# Patient Record
Sex: FEMALE | Race: WHITE | Employment: FULL TIME | URBAN - METROPOLITAN AREA
[De-identification: names, ages, dates, MRNs, and addresses within clinical notes are randomized per-mention and may not be internally consistent; named-entity substitution may affect disease eponyms.]

---

## 2023-02-21 PROBLEM — F41.9 ANXIETY: Status: ACTIVE | Noted: 2023-02-21

## 2023-02-21 PROBLEM — G47.19 EXCESSIVE DAYTIME SLEEPINESS: Status: ACTIVE | Noted: 2023-02-21

## 2023-02-21 PROBLEM — K90.49 FOOD INTOLERANCE IN ADULT: Status: ACTIVE | Noted: 2023-02-21

## 2023-02-21 PROBLEM — R53.83 FATIGUE: Status: ACTIVE | Noted: 2023-02-21

## 2023-02-21 PROBLEM — R63.0 ANOREXIA: Status: ACTIVE | Noted: 2023-02-21

## 2023-02-21 PROBLEM — S61.239A PUNCTURE WOUND OF FINGER OF LEFT HAND: Status: ACTIVE | Noted: 2023-02-21

## 2023-03-29 ENCOUNTER — OFFICE VISIT (OUTPATIENT)
Dept: PRIMARY CARE | Facility: CLINIC | Age: 23
End: 2023-03-29
Payer: COMMERCIAL

## 2023-03-29 VITALS
HEART RATE: 76 BPM | HEIGHT: 65 IN | WEIGHT: 127.6 LBS | BODY MASS INDEX: 21.26 KG/M2 | DIASTOLIC BLOOD PRESSURE: 70 MMHG | SYSTOLIC BLOOD PRESSURE: 104 MMHG

## 2023-03-29 DIAGNOSIS — F41.9 ANXIETY: ICD-10-CM

## 2023-03-29 DIAGNOSIS — Z00.00 HEALTHCARE MAINTENANCE: Primary | ICD-10-CM

## 2023-03-29 DIAGNOSIS — R63.0 ANOREXIA: ICD-10-CM

## 2023-03-29 PROBLEM — G47.19 EXCESSIVE DAYTIME SLEEPINESS: Status: RESOLVED | Noted: 2023-02-21 | Resolved: 2023-03-29

## 2023-03-29 PROBLEM — K90.49 FOOD INTOLERANCE IN ADULT: Status: RESOLVED | Noted: 2023-02-21 | Resolved: 2023-03-29

## 2023-03-29 PROBLEM — S61.239A PUNCTURE WOUND OF FINGER OF LEFT HAND: Status: RESOLVED | Noted: 2023-02-21 | Resolved: 2023-03-29

## 2023-03-29 PROCEDURE — 1036F TOBACCO NON-USER: CPT | Performed by: FAMILY MEDICINE

## 2023-03-29 PROCEDURE — 99395 PREV VISIT EST AGE 18-39: CPT | Performed by: FAMILY MEDICINE

## 2023-03-29 NOTE — PROGRESS NOTES
"Subjective   Patient ID: Whit Chacko is a 23 y.o. female who presents for physical exam.     HPI   Patient presents for physical exam.  He was at the [facility] February through May.  She was then sent to [facility].  Was diagnosed with anorexia September 2021 was seeing a therapist weekly and dietitian for through the month of October 2022.    Currently patient is still having thoughts about restriction and body image.  She is a self-proclaimed perfectionist.  She then saw another dietitian that was very unhelpful.  She also started her therapist again in April 2022 weekly.  This is Geena Rios through the [facility].  Apparently in December 2022 she started food aversion again and did this for 3 weeks.  She was only eating a small snack daily for 3 weeks.  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  She started his new job March 2023.      Fam Hx  Mom side heart issues  Mom ()  Dad ()  Sister  Sister Bipolar  Brother  Brother    Exercise walks, plays tennis  ETOH denies  Caffeine denies  Tobacco denies    Mammogram @ 40  DEXA @ 65  Colonoscopy @ 45    Patient denies other complaints.    Review of Systems  Denies any other complaints at this time.     Objective   /70 (BP Location: Right arm)   Pulse 76   Ht 1.657 m (5' 5.25\")   Wt 57.9 kg (127 lb 9.6 oz)   BMI 21.07 kg/m²   BSA Body surface area is 1.63 meters squared.      Physical Exam  General alert no distress  HEENT EOMI PERRLA  Heart no murmurs rubs gallops appreciated  Lung no wheezes no rales no rhonchi appreciated  Abdomen soft tender no rebound rigidity or guarding appreciated    Legacy Encounter on 02/13/2023   Component Date Value Ref Range Status    Color, Urine 02/13/2023 YELLOW  STRAW,YELLOW Final    Appearance, Urine 02/13/2023 CLEAR  CLEAR Final    Specific Gravity, Urine 02/13/2023 1.020  1.005 - 1.035 Final    " pH, Urine 02/13/2023 5.0  5.0 - 8.0 Final    Protein, Urine 02/13/2023 NEGATIVE  NEGATIVE mg/dL Final    Glucose, Urine 02/13/2023 NEGATIVE  NEGATIVE mg/dL Final    Blood, Urine 02/13/2023 NEGATIVE  NEGATIVE Final    Ketones, Urine 02/13/2023 NEGATIVE  NEGATIVE mg/dL Final    Bilirubin, Urine 02/13/2023 NEGATIVE  NEGATIVE Final    Urobilinogen, Urine 02/13/2023 <2.0  0.0 - 1.9 mg/dL Final    Nitrite, Urine 02/13/2023 NEGATIVE  NEGATIVE Final    Leukocyte Esterase, Urine 02/13/2023 NEGATIVE  NEGATIVE Final    Glucose 02/13/2023 95  74 - 99 mg/dL Final    Sodium 02/13/2023 139  136 - 145 mmol/L Final    Potassium 02/13/2023 4.6  3.5 - 5.3 mmol/L Final    Chloride 02/13/2023 103  98 - 107 mmol/L Final    Bicarbonate 02/13/2023 28  21 - 32 mmol/L Final    Anion Gap 02/13/2023 13  10 - 20 mmol/L Final    Urea Nitrogen 02/13/2023 12  6 - 23 mg/dL Final    Creatinine 02/13/2023 0.85  0.50 - 1.05 mg/dL Final    GFR Female 02/13/2023 >90  >90 mL/min/1.73m2 Final    Comment:  CALCULATIONS OF ESTIMATED GFR ARE PERFORMED   USING THE 2021 CKD-EPI STUDY REFIT EQUATION   WITHOUT THE RACE VARIABLE FOR THE IDMS-TRACEABLE   CREATININE METHODS.    https://jasn.asnjournals.org/content/early/2021/09/22/ASN.7795374636      Calcium 02/13/2023 10.1  8.6 - 10.6 mg/dL Final    Albumin 02/13/2023 4.9  3.4 - 5.0 g/dL Final    Alkaline Phosphatase 02/13/2023 38  33 - 110 U/L Final    Total Protein 02/13/2023 7.4  6.4 - 8.2 g/dL Final    AST 02/13/2023 54 (H)  9 - 39 U/L Final    Total Bilirubin 02/13/2023 0.7  0.0 - 1.2 mg/dL Final    ALT (SGPT) 02/13/2023 17  7 - 45 U/L Final    Comment:  Patients treated with Sulfasalazine may generate    falsely decreased results for ALT.      WBC 02/13/2023 4.5  4.4 - 11.3 x10E9/L Final    nRBC 02/13/2023 0.0  0.0 - 0.0 /100 WBC Final    RBC 02/13/2023 4.81  4.00 - 5.20 x10E12/L Final    Hemoglobin 02/13/2023 14.4  12.0 - 16.0 g/dL Final    Hematocrit 02/13/2023 44.2  36.0 - 46.0 % Final    MCV 02/13/2023  92  80 - 100 fL Final    MCHC 02/13/2023 32.6  32.0 - 36.0 g/dL Final    Platelets 02/13/2023 271  150 - 450 x10E9/L Final    RDW 02/13/2023 12.1  11.5 - 14.5 % Final    Neutrophils % 02/13/2023 56.9  40.0 - 80.0 % Final    Immature Granulocytes %, Automated 02/13/2023 0.2  0.0 - 0.9 % Final    Comment:  Immature Granulocyte Count (IG) includes promyelocytes,    myelocytes and metamyelocytes but does not include bands.   Percent differential counts (%) should be interpreted in the   context of the absolute cell counts (cells/L).      Lymphocytes % 02/13/2023 32.8  13.0 - 44.0 % Final    Monocytes % 02/13/2023 6.5  2.0 - 10.0 % Final    Eosinophils % 02/13/2023 2.5  0.0 - 6.0 % Final    Basophils % 02/13/2023 1.1  0.0 - 2.0 % Final    Neutrophils Absolute 02/13/2023 2.55  1.20 - 7.70 x10E9/L Final    Lymphocytes Absolute 02/13/2023 1.47  1.20 - 4.80 x10E9/L Final    Monocytes Absolute 02/13/2023 0.29  0.10 - 1.00 x10E9/L Final    Eosinophils Absolute 02/13/2023 0.11  0.00 - 0.70 x10E9/L Final    Basophils Absolute 02/13/2023 0.05  0.00 - 0.10 x10E9/L Final    Phosphorus 02/13/2023 3.8  2.5 - 4.9 mg/dL Final    Comment:  The performance characteristics of phosphorus testing in   heparinized plasma have been validated by the individual     laboratory site where testing is performed. Testing    on heparinized plasma is not approved by the FDA;    however, such approval is not necessary.      Magnesium 02/13/2023 2.18  1.60 - 2.40 mg/dL Final   Legacy Encounter on 12/27/2022   Component Date Value Ref Range Status    Hepatitis C Ab 12/27/2022 NONREACTIVE  NONREACTIVE Final    Comment:  Results from patients taking biotin supplements or receiving   high-dose biotin therapy should be interpreted with caution   due to possible interference with this test. Providers may    contact their local laboratory for further information.     Legacy Encounter on 12/08/2022   Component Date Value Ref Range Status    DRUG SCREEN  COMMENT URINE 12/08/2022 SEE BELOW   Final    Comment: Drug screen results are presumptive and should not be used to assess   compliance with prescribed medication. Definitive confirmatory drug testing   has been added to this sample for any positive screen result and will be   reported separately.   .  Toxicology screening results are reported qualitatively. The concentration   must be greater than or equal to the cutoff to be reported as positive. The   concentration at which the screening test can detect an individual drug or   metabolite varies. The absence of expected drug(s) and/or drug metabolite(s)   may indicate non-compliance, inappropriate timing of specimen collection   relative to drug administration, poor drug absorption, diluted/adulterated   urine, or limitations of testing. For medical purposes only; not valid for   forensic use.   .  Interpretive questions should be directed to the laboratory medical   directors.        Amphetamine Screen, Urine 12/08/2022 PRESUMPTIVE NEGATIVE  NEGATIVE Final    Comment:  CUTOFF LEVEL:  500 NG/ML   Cross-reactivity has been reported with high concentrations   of the following drugs: buproprion, chloroquine, chlorpromazine,   ephedrine, mephentermine, fenfluramine, phentermine,   phenylpropanolamine, pseudoephedrine, and propranolol.      Barbiturate Screen, Urine 12/08/2022 PRESUMPTIVE NEGATIVE  NEGATIVE Final     CUTOFF LEVEL: 200 NG/ML    BENZODIAZEPINE (PRESENCE) IN URINE* 12/08/2022 PRESUMPTIVE NEGATIVE  NEGATIVE Final     CUTOFF LEVEL: 200 NG/ML    Cannabinoid Screen, Urine 12/08/2022 PRESUMPTIVE NEGATIVE  NEGATIVE Final     CUTOFF LEVEL: 50 NG/ML    Cocaine Screen, Urine 12/08/2022 PRESUMPTIVE NEGATIVE  NEGATIVE Final     CUTOFF LEVEL: 150 NG/ML    Fentanyl, Ur 12/08/2022 PRESUMPTIVE NEGATIVE  NEGATIVE Final      CUTOFF LEVEL:  5 NG/ML    Methadone Screen, Urine 12/08/2022 PRESUMPTIVE NEGATIVE  NEGATIVE Final    Comment:  CUTOFF LEVEL: 150 NG/ML   The  metabolite L-alpha-acetylmethadol (LAAM) is not   detected by this method in concentrations that would   be found in the urine of patients on LAAM therapy.      Opiate Screen, Urine 12/08/2022 PRESUMPTIVE NEGATIVE  NEGATIVE Final    Comment:  CUTOFF LEVEL: 300 NG/ML   The opiate screen does not detect fentanyl, meperidine, or    tramadol. Oxycodone is not consistently detected (refer to   Oxycodone Screen, Urine result).      Oxycodone Screen, Ur 12/08/2022 PRESUMPTIVE NEGATIVE  NEGATIVE Final    Comment:  CUTOFF LEVEL: 100 NG/ML    This test will accurately detect both oxycodone and oxymorphone.           PCP Screen, Urine 12/08/2022 PRESUMPTIVE NEGATIVE  NEGATIVE Final    Comment:  CUTOFF LEVEL:  25 NG/ML   Cross-reactivity has been reported with dextromethorphan.     Legacy Encounter on 11/25/2022   Component Date Value Ref Range Status    SARS-COV-2 RESULT 11/25/2022 NOT DETECTED  Not Detected Final    Comment: .  This test has received FDA Emergency Use Authorization (EUA) and has been   verified by [facility]. This test is only   authorized for the duration of time that circumstances exist to justify the   authorization of the emergency use of in vitro diagnostic tests for the   detection of SARS-CoV-2 virus and/or diagnosis of COVID-19 infection under   section 564(b)(1) of the Act, 21 U.S.C. 360bbb-3(b)(1), unless the   authorization is terminated or revoked sooner.     [facility] is certified under CLIA-88 as   qualified to perform high complexity testing. Testing is performed in the   [facility] laboratory located at [address] [place] [address].    SARS-CoV-2/Flu/RSV Multiplex Test:   Fact sheet for providers: https://www.fda.gov/media/635498/download  Fact sheet for patients: https://www.fda.gov/media/388401/download     Legacy Encounter on 10/10/2022   Component Date Value Ref Range Status    Cholesterol 10/10/2022 148  0  - 199 mg/dL Final    Comment: .      AGE      DESIRABLE   BORDERLINE HIGH   HIGH     0-19 Y     0 - 169       170 - 199     >/= 200    20-24 Y     0 - 189       190 - 224     >/= 225         >24 Y     0 - 199       200 - 239     >/= 240   **All ranges are based on fasting samples. Specific   therapeutic targets will vary based on patient-specific   cardiac risk.  .   Pediatric guidelines reference:Pediatrics 2011, 128(S5).   Adult guidelines reference: NCEP ATPIII Guidelines,     ALMA ROSA 2001, 258:2486-97  .   Venipuncture immediately after or during the    administration of Metamizole may lead to falsely   low results. Testing should be performed immediately   prior to Metamizole dosing.      HDL 10/10/2022 61.8  mg/dL Final    Comment: .      AGE      VERY LOW   LOW     NORMAL    HIGH       0-19 Y       < 35   < 40     40-45     ----    20-24 Y       ----   < 40       >45     ----      >24 Y       ----   < 40     40-60      >60  .      Cholesterol/HDL Ratio 10/10/2022 2.4   Final    Comment: REF VALUES  DESIRABLE  < 3.4  HIGH RISK  > 5.0      LDL 10/10/2022 78  0 - 119 mg/dL Final    Comment: .                           NEAR      BORD      AGE      DESIRABLE  OPTIMAL    HIGH     HIGH     VERY HIGH     0-19 Y     0 - 109     ---    110-129   >/= 130     ----    20-24 Y     0 - 119     ---    120-159   >/= 160     ----      >24 Y     0 -  99   100-129  130-159   160-189     >/=190  .      VLDL 10/10/2022 8  0 - 40 mg/dL Final    Triglycerides 10/10/2022 42  0 - 149 mg/dL Final    Comment: .      AGE      DESIRABLE   BORDERLINE HIGH   HIGH     VERY HIGH   0 D-90 D    19 - 174         ----         ----        ----  91 D- 9 Y     0 -  74        75 -  99     >/= 100      ----    10-19 Y     0 -  89        90 - 129     >/= 130      ----    20-24 Y     0 - 114       115 - 149     >/= 150      ----         >24 Y     0 - 149       150 - 199    200- 499    >/= 500  .   Venipuncture immediately after or during the     administration of Metamizole may lead to falsely   low results. Testing should be performed immediately   prior to Metamizole dosing.      Non HDL Cholesterol 10/10/2022 86  0 - 149 mg/dL Final    Comment:     AGE      DESIRABLE   BORDERLINE HIGH   HIGH     VERY HIGH     0-19 Y     0 - 119       120 - 144     >/= 145    >/= 160    20-24 Y     0 - 149       150 - 189     >/= 190      ----         >24 Y    30 MG/DL ABOVE LDL CHOLESTEROL GOAL  .      Glucose 10/10/2022 86  74 - 99 mg/dL Final    Sodium 10/10/2022 140  136 - 145 mmol/L Final    Potassium 10/10/2022 4.0  3.5 - 5.3 mmol/L Final    Chloride 10/10/2022 103  98 - 107 mmol/L Final    Bicarbonate 10/10/2022 28  21 - 32 mmol/L Final    Anion Gap 10/10/2022 13  10 - 20 mmol/L Final    Urea Nitrogen 10/10/2022 8  6 - 23 mg/dL Final    Creatinine 10/10/2022 0.71  0.50 - 1.05 mg/dL Final    GFR Female 10/10/2022 >90  >90 mL/min/1.73m2 Final    Comment:  CALCULATIONS OF ESTIMATED GFR ARE PERFORMED   USING THE 2021 CKD-EPI STUDY REFIT EQUATION   WITHOUT THE RACE VARIABLE FOR THE IDMS-TRACEABLE   CREATININE METHODS.    https://jasn.asnjournals.org/content/early/2021/09/22/ASN.2529605791      Calcium 10/10/2022 9.8  8.6 - 10.6 mg/dL Final    Albumin 10/10/2022 4.8  3.4 - 5.0 g/dL Final    Alkaline Phosphatase 10/10/2022 38  33 - 110 U/L Final    Total Protein 10/10/2022 7.5  6.4 - 8.2 g/dL Final    AST 10/10/2022 14  9 - 39 U/L Final    Total Bilirubin 10/10/2022 0.9  0.0 - 1.2 mg/dL Final    ALT (SGPT) 10/10/2022 7  7 - 45 U/L Final    Comment:  Patients treated with Sulfasalazine may generate    falsely decreased results for ALT.     Legacy Encounter on 08/19/2022   Component Date Value Ref Range Status    WBC 08/19/2022 4.3 (L)  4.4 - 11.3 x10E9/L Final    nRBC 08/19/2022 0.0  0.0 - 0.0 /100 WBC Final    RBC 08/19/2022 4.87  4.00 - 5.20 x10E12/L Final    Hemoglobin 08/19/2022 15.2  12.0 - 16.0 g/dL Final    Hematocrit 08/19/2022 45.9  36.0 - 46.0 % Final    MCV  08/19/2022 94  80 - 100 fL Final    MCHC 08/19/2022 33.1  32.0 - 36.0 g/dL Final    Platelets 08/19/2022 233  150 - 450 x10E9/L Final    RDW 08/19/2022 11.9  11.5 - 14.5 % Final    Iron 08/19/2022 102  35 - 150 ug/dL Final    TIBC 08/19/2022 345  240 - 445 ug/dL Final    Iron Saturation 08/19/2022 30  25 - 45 % Final    Vitamin D, 25-Hydroxy 08/19/2022 50  ng/mL Final    Comment: .  DEFICIENCY:         < 20   NG/ML  INSUFFICIENCY:      20-29  NG/ML  SUFFICIENCY:         NG/ML    THIS ASSAY ACCURATELY QUANTIFIES THE SUM OF  VITAMIN D3, 25-HYDROXY AND VIT D2,25-HYDROXY.      Glucose 08/19/2022 97  74 - 99 mg/dL Final    Sodium 08/19/2022 141  136 - 145 mmol/L Final    Potassium 08/19/2022 4.4  3.5 - 5.3 mmol/L Final    Chloride 08/19/2022 103  98 - 107 mmol/L Final    Bicarbonate 08/19/2022 28  21 - 32 mmol/L Final    Anion Gap 08/19/2022 14  10 - 20 mmol/L Final    Urea Nitrogen 08/19/2022 12  6 - 23 mg/dL Final    Creatinine 08/19/2022 0.84  0.50 - 1.05 mg/dL Final    GFR Female 08/19/2022 >90  >90 mL/min/1.73m2 Final    Comment:  CALCULATIONS OF ESTIMATED GFR ARE PERFORMED   USING THE 2021 CKD-EPI STUDY REFIT EQUATION   WITHOUT THE RACE VARIABLE FOR THE IDMS-TRACEABLE   CREATININE METHODS.    https://jasn.asnjournals.org/content/early/2021/09/22/ASN.[email or web address]      Calcium 08/19/2022 10.4  8.6 - 10.6 mg/dL Final    Ferritin 08/19/2022 32  8 - 150 ug/L Final    TSH 08/19/2022 1.24  0.44 - 3.98 mIU/L Final    Comment:  TSH testing is performed using different testing    methodology at [facility] than at other    [facility] hospitals. Direct result comparisons should    only be made within the same method.     Legacy Encounter on 07/06/2022   Component Date Value Ref Range Status    WBC 07/06/2022 8.3  4.4 - 11.3 x10E9/L Final    nRBC 07/06/2022 0.0  0.0 - 0.0 /100 WBC Final    RBC 07/06/2022 4.61  4.00 - 5.20 x10E12/L Final    Hemoglobin 07/06/2022 14.1  12.0 - 16.0 g/dL Final    Hematocrit 07/06/2022  42.0  36.0 - 46.0 % Final    MCV 07/06/2022 91  80 - 100 fL Final    MCHC 07/06/2022 33.6  32.0 - 36.0 g/dL Final    Platelets 07/06/2022 261  150 - 450 x10E9/L Final    RDW 07/06/2022 11.7  11.5 - 14.5 % Final    HSV 1 IgG 07/06/2022 <0.2  INDEX Final    Comment: REF VALUES  NEGATIVE   <0.9  EQUIVOCAL  >=0.90  <=1.10  POSITIVE   >1.10      HSV 2 IgG 07/06/2022 <0.2  INDEX Final    Comment: POTENTIAL FOR CROSS-REACTIVITY BETWEEN  HSV I AND HSV II EXISTS.  REF VALUES  NEGATIVE   <0.9  EQUIVOCAL  >=0.90  <=1.10  POSITIVE   >1.10      TSH 07/06/2022 0.90  0.44 - 3.98 mIU/L Final    Comment:  TSH testing is performed using different testing    methodology at [facility] than at other    [facility]. Direct result comparisons should    only be made within the same method.      Glucose 07/06/2022 150 (H)  74 - 99 mg/dL Final    Sodium 07/06/2022 138  136 - 145 mmol/L Final    Potassium 07/06/2022 4.2  3.5 - 5.3 mmol/L Final    Chloride 07/06/2022 102  98 - 107 mmol/L Final    Bicarbonate 07/06/2022 27  21 - 32 mmol/L Final    Anion Gap 07/06/2022 13  10 - 20 mmol/L Final    Urea Nitrogen 07/06/2022 8  6 - 23 mg/dL Final    Creatinine 07/06/2022 0.79  0.50 - 1.05 mg/dL Final    GFR Female 07/06/2022 >90  >90 mL/min/1.73m2 Final    Comment:  CALCULATIONS OF ESTIMATED GFR ARE PERFORMED   USING THE 2021 CKD-EPI STUDY REFIT EQUATION   WITHOUT THE RACE VARIABLE FOR THE IDMS-TRACEABLE   CREATININE METHODS.    https://jasn.asnjournals.org/content/early/2021/09/22/ASN.[email or web address]      Calcium 07/06/2022 9.6  8.6 - 10.6 mg/dL Final    Vitamin D, 25-Hydroxy 07/06/2022 25 (A)  ng/mL Final    Comment: .  DEFICIENCY:         < 20   NG/ML  INSUFFICIENCY:      20-29  NG/ML  SUFFICIENCY:         NG/ML    THIS ASSAY ACCURATELY QUANTIFIES THE SUM OF  VITAMIN D3, 25-HYDROXY AND VIT D2,25-HYDROXY.     Legacy Encounter on 06/24/2022   Component Date Value Ref Range Status    HIV 1 and 2 Screen 06/24/2022 NONREACTIVE   NONREACTIVE Final    Comment:  HIV Ag/Ab screen is performed using the Siemens [employer]   HIV Ag/Ab Combo assay which detects the presence of HIV    p24 antigen as well as antibodies to HIV-1   (Group M and O) and HIV-2.  .  No laboratory evidence of HIV infection. If acute HIV infection is   suspected, consider testing for HIV RNA by PCR (viral load).     Legacy Encounter on 06/16/2022   Component Date Value Ref Range Status    Tobacco Screen, Urine 06/16/2022 NEGATIVE   Final    Comment: Cotinine, a metabolite of nicotine, is measured to screen   for nicotine exposure. The cut-off is set at 300ng/mL to   detect active exposure (smoking).    This test was developed and its performance characteristics   were determined by the [facility].       No current outpatient medications on file prior to visit.     No current facility-administered medications on file prior to visit.     No images are attached to the encounter.            Assessment/Plan   Diagnoses and all orders for this visit:  Healthcare maintenance  Anorexia  Anxiety    1.  Patient's blood work discussed at this office visit  2.  Patient's LDL goal less than 130 current LDL unavailable  3.  Mammogram @ 40  4.  DEXA @ 65  5.  Colonoscopy @ 45  6.  Patient to call if questions or concerns

## 2023-03-29 NOTE — PATIENT INSTRUCTIONS
1.  Patient's blood work discussed at this office visit  2.  Patient's LDL goal less than 130 current LDL unavailable  3.  Mammogram @ 40  4.  DEXA @ 65  5.  Colonoscopy @ 45  6.  Patient to call if questions or concerns

## 2023-03-29 NOTE — PROGRESS NOTES
Subjective   Patient ID: Whit Chacko is a 23 y.o. female who presents for No chief complaint on file..    HPI     Review of Systems    Objective   There were no vitals taken for this visit.  BSA There is no height or weight on file to calculate BSA.      Physical Exam  [facility]acy Encounter on 02/13/2023   Component Date Value Ref Range Status    Color, Urine 02/13/2023 YELLOW  STRAW,YELLOW Final    Appearance, Urine 02/13/2023 CLEAR  CLEAR Final    Specific Gravity, Urine 02/13/2023 1.020  1.005 - 1.035 Final    pH, Urine 02/13/2023 5.0  5.0 - 8.0 Final    Protein, Urine 02/13/2023 NEGATIVE  NEGATIVE mg/dL Final    Glucose, Urine 02/13/2023 NEGATIVE  NEGATIVE mg/dL Final    Blood, Urine 02/13/2023 NEGATIVE  NEGATIVE Final    Ketones, Urine 02/13/2023 NEGATIVE  NEGATIVE mg/dL Final    Bilirubin, Urine 02/13/2023 NEGATIVE  NEGATIVE Final    Urobilinogen, Urine 02/13/2023 <2.0  0.0 - 1.9 mg/dL Final    Nitrite, Urine 02/13/2023 NEGATIVE  NEGATIVE Final    Leukocyte Esterase, Urine 02/13/2023 NEGATIVE  NEGATIVE Final    Glucose 02/13/2023 95  74 - 99 mg/dL Final    Sodium 02/13/2023 139  136 - 145 mmol/L Final    Potassium 02/13/2023 4.6  3.5 - 5.3 mmol/L Final    Chloride 02/13/2023 103  98 - 107 mmol/L Final    Bicarbonate 02/13/2023 28  21 - 32 mmol/L Final    Anion Gap 02/13/2023 13  10 - 20 mmol/L Final    Urea Nitrogen 02/13/2023 12  6 - 23 mg/dL Final    Creatinine 02/13/2023 0.85  0.50 - 1.05 mg/dL Final    GFR Female 02/13/2023 >90  >90 mL/min/1.73m2 Final    Comment:  CALCULATIONS OF ESTIMATED GFR ARE PERFORMED   USING THE 2021 CKD-EPI STUDY REFIT EQUATION   WITHOUT THE RACE VARIABLE FOR THE IDMS-TRACEABLE   CREATININE METHODS.    https://jasn.asnjournals.org/content/early/2021/09/22/ASN.[email or web address]      Calcium 02/13/2023 10.1  8.6 - 10.6 mg/dL Final    Albumin 02/13/2023 4.9  3.4 - 5.0 g/dL Final    Alkaline Phosphatase 02/13/2023 38  33 - 110 U/L Final    Total Protein 02/13/2023 7.4  6.4 - 8.2 g/dL Final     AST 02/13/2023 54 (H)  9 - 39 U/L Final    Total Bilirubin 02/13/2023 0.7  0.0 - 1.2 mg/dL Final    ALT (SGPT) 02/13/2023 17  7 - 45 U/L Final    Comment:  Patients treated with Sulfasalazine may generate    falsely decreased results for ALT.      WBC 02/13/2023 4.5  4.4 - 11.3 x10E9/L Final    nRBC 02/13/2023 0.0  0.0 - 0.0 /100 WBC Final    RBC 02/13/2023 4.81  4.00 - 5.20 x10E12/L Final    Hemoglobin 02/13/2023 14.4  12.0 - 16.0 g/dL Final    Hematocrit 02/13/2023 44.2  36.0 - 46.0 % Final    MCV 02/13/2023 92  80 - 100 fL Final    MCHC 02/13/2023 32.6  32.0 - 36.0 g/dL Final    Platelets 02/13/2023 271  150 - 450 x10E9/L Final    RDW 02/13/2023 12.1  11.5 - 14.5 % Final    Neutrophils % 02/13/2023 56.9  40.0 - 80.0 % Final    Immature Granulocytes %, Automated 02/13/2023 0.2  0.0 - 0.9 % Final    Comment:  Immature Granulocyte Count (IG) includes promyelocytes,    myelocytes and metamyelocytes but does not include bands.   Percent differential counts (%) should be interpreted in the   context of the absolute cell counts (cells/L).      Lymphocytes % 02/13/2023 32.8  13.0 - 44.0 % Final    Monocytes % 02/13/2023 6.5  2.0 - 10.0 % Final    Eosinophils % 02/13/2023 2.5  0.0 - 6.0 % Final    Basophils % 02/13/2023 1.1  0.0 - 2.0 % Final    Neutrophils Absolute 02/13/2023 2.55  1.20 - 7.70 x10E9/L Final    Lymphocytes Absolute 02/13/2023 1.47  1.20 - 4.80 x10E9/L Final    Monocytes Absolute 02/13/2023 0.29  0.10 - 1.00 x10E9/L Final    Eosinophils Absolute 02/13/2023 0.11  0.00 - 0.70 x10E9/L Final    Basophils Absolute 02/13/2023 0.05  0.00 - 0.10 x10E9/L Final    Phosphorus 02/13/2023 3.8  2.5 - 4.9 mg/dL Final    Comment:  The performance characteristics of phosphorus testing in   heparinized plasma have been validated by the individual     laboratory site where testing is performed. Testing    on heparinized plasma is not approved by the FDA;    however, such approval is not necessary.      Magnesium 02/13/2023  2.18  1.60 - 2.40 mg/dL Final   Legacy Encounter on 12/27/2022   Component Date Value Ref Range Status    Hepatitis C Ab 12/27/2022 NONREACTIVE  NONREACTIVE Final    Comment:  Results from patients taking biotin supplements or receiving   high-dose biotin therapy should be interpreted with caution   due to possible interference with this test. Providers may    contact their local laboratory for further information.     Legacy Encounter on 12/08/2022   Component Date Value Ref Range Status    DRUG SCREEN COMMENT URINE 12/08/2022 SEE BELOW   Final    Comment: Drug screen results are presumptive and should not be used to assess   compliance with prescribed medication. Definitive confirmatory drug testing   has been added to this sample for any positive screen result and will be   reported separately.   .  Toxicology screening results are reported qualitatively. The concentration   must be greater than or equal to the cutoff to be reported as positive. The   concentration at which the screening test can detect an individual drug or   metabolite varies. The absence of expected drug(s) and/or drug metabolite(s)   may indicate non-compliance, inappropriate timing of specimen collection   relative to drug administration, poor drug absorption, diluted/adulterated   urine, or limitations of testing. For medical purposes only; not valid for   forensic use.   .  Interpretive questions should be directed to the laboratory medical   directors.        Amphetamine Screen, Urine 12/08/2022 PRESUMPTIVE NEGATIVE  NEGATIVE Final    Comment:  CUTOFF LEVEL:  500 NG/ML   Cross-reactivity has been reported with high concentrations   of the following drugs: buproprion, chloroquine, chlorpromazine,   ephedrine, mephentermine, fenfluramine, phentermine,   phenylpropanolamine, pseudoephedrine, and propranolol.      Barbiturate Screen, Urine 12/08/2022 PRESUMPTIVE NEGATIVE  NEGATIVE Final     CUTOFF LEVEL: 200 NG/ML    BENZODIAZEPINE (PRESENCE)  IN URINE* 12/08/2022 PRESUMPTIVE NEGATIVE  NEGATIVE Final     CUTOFF LEVEL: 200 NG/ML    Cannabinoid Screen, Urine 12/08/2022 PRESUMPTIVE NEGATIVE  NEGATIVE Final     CUTOFF LEVEL: 50 NG/ML    Cocaine Screen, Urine 12/08/2022 PRESUMPTIVE NEGATIVE  NEGATIVE Final     CUTOFF LEVEL: 150 NG/ML    Fentanyl, Ur 12/08/2022 PRESUMPTIVE NEGATIVE  NEGATIVE Final      CUTOFF LEVEL:  5 NG/ML    Methadone Screen, Urine 12/08/2022 PRESUMPTIVE NEGATIVE  NEGATIVE Final    Comment:  CUTOFF LEVEL: 150 NG/ML   The metabolite L-alpha-acetylmethadol (LAAM) is not   detected by this method in concentrations that would   be found in the urine of patients on LAAM therapy.      Opiate Screen, Urine 12/08/2022 PRESUMPTIVE NEGATIVE  NEGATIVE Final    Comment:  CUTOFF LEVEL: 300 NG/ML   The opiate screen does not detect fentanyl, meperidine, or    tramadol. Oxycodone is not consistently detected (refer to   Oxycodone Screen, Urine result).      Oxycodone Screen, Ur 12/08/2022 PRESUMPTIVE NEGATIVE  NEGATIVE Final    Comment:  CUTOFF LEVEL: 100 NG/ML    This test will accurately detect both oxycodone and oxymorphone.           PCP Screen, Urine 12/08/2022 PRESUMPTIVE NEGATIVE  NEGATIVE Final    Comment:  CUTOFF LEVEL:  25 NG/ML   Cross-reactivity has been reported with dextromethorphan.     Legacy Encounter on 11/25/2022   Component Date Value Ref Range Status    SARS-COV-2 RESULT 11/25/2022 NOT DETECTED  Not Detected Final    Comment: .  This test has received FDA Emergency Use Authorization (EUA) and has been   verified by [facility]. This test is only   authorized for the duration of time that circumstances exist to justify the   authorization of the emergency use of in vitro diagnostic tests for the   detection of SARS-CoV-2 virus and/or diagnosis of COVID-19 infection under   section 564(b)(1) of the Act, 21 U.S.C. 360bbb-3(b)(1), unless the   authorization is terminated or revoked sooner.     [facility] is certified under CLIA-88 as   qualified to perform high complexity testing. Testing is performed in the   [facility] laboratory located at [address] [place] [address].    SARS-CoV-2/Flu/RSV Multiplex Test:   Fact sheet for providers: https://www.fda.gov/media/165116/download  Fact sheet for patients: https://www.fda.gov/media/910622/download     Legacy Encounter on 10/10/2022   Component Date Value Ref Range Status    Cholesterol 10/10/2022 148  0 - 199 mg/dL Final    Comment: .      AGE      DESIRABLE   BORDERLINE HIGH   HIGH     0-19 Y     0 - 169       170 - 199     >/= 200    20-24 Y     0 - 189       190 - 224     >/= 225         >24 Y     0 - 199       200 - 239     >/= 240   **All ranges are based on fasting samples. Specific   therapeutic targets will vary based on patient-specific   cardiac risk.  .   Pediatric guidelines reference:Pediatrics 2011, 128(S5).   Adult guidelines reference: NCEP ATPIII Guidelines,     ALMA ROSA 2001, 258:2486-97  .   Venipuncture immediately after or during the    administration of Metamizole may lead to falsely   low results. Testing should be performed immediately   prior to Metamizole dosing.      HDL 10/10/2022 61.8  mg/dL Final    Comment: .      AGE      VERY LOW   LOW     NORMAL    HIGH       0-19 Y       < 35   < 40     40-45     ----    20-24 Y       ----   < 40       >45     ----      >24 Y       ----   < 40     40-60      >60  .      Cholesterol/HDL Ratio 10/10/2022 2.4   Final    Comment: REF VALUES  DESIRABLE  < 3.4  HIGH RISK  > 5.0      LDL 10/10/2022 78  0 - 119 mg/dL Final    Comment: .                           NEAR      BORD      AGE      DESIRABLE  OPTIMAL    HIGH     HIGH     VERY HIGH     0-19 Y     0 - 109     ---    110-129   >/= 130     ----    20-24 Y     0 - 119     ---    120-159   >/= 160     ----      >24 Y     0 -  99   100-129  130-159   160-189     >/=190  .      VLDL 10/10/2022 8  0 - 40 mg/dL  Final    Triglycerides 10/10/2022 42  0 - 149 mg/dL Final    Comment: .      AGE      DESIRABLE   BORDERLINE HIGH   HIGH     VERY HIGH   0 D-90 D    19 - 174         ----         ----        ----  91 D- 9 Y     0 -  74        75 -  99     >/= 100      ----    10-19 Y     0 -  89        90 - 129     >/= 130      ----    20-24 Y     0 - 114       115 - 149     >/= 150      ----         >24 Y     0 - 149       150 - 199    200- 499    >/= 500  .   Venipuncture immediately after or during the    administration of Metamizole may lead to falsely   low results. Testing should be performed immediately   prior to Metamizole dosing.      Non HDL Cholesterol 10/10/2022 86  0 - 149 mg/dL Final    Comment:     AGE      DESIRABLE   BORDERLINE HIGH   HIGH     VERY HIGH     0-19 Y     0 - 119       120 - 144     >/= 145    >/= 160    20-24 Y     0 - 149       150 - 189     >/= 190      ----         >24 Y    30 MG/DL ABOVE LDL CHOLESTEROL GOAL  .      Glucose 10/10/2022 86  74 - 99 mg/dL Final    Sodium 10/10/2022 140  136 - 145 mmol/L Final    Potassium 10/10/2022 4.0  3.5 - 5.3 mmol/L Final    Chloride 10/10/2022 103  98 - 107 mmol/L Final    Bicarbonate 10/10/2022 28  21 - 32 mmol/L Final    Anion Gap 10/10/2022 13  10 - 20 mmol/L Final    Urea Nitrogen 10/10/2022 8  6 - 23 mg/dL Final    Creatinine 10/10/2022 0.71  0.50 - 1.05 mg/dL Final    GFR Female 10/10/2022 >90  >90 mL/min/1.73m2 Final    Comment:  CALCULATIONS OF ESTIMATED GFR ARE PERFORMED   USING THE 2021 CKD-EPI STUDY REFIT EQUATION   WITHOUT THE RACE VARIABLE FOR THE IDMS-TRACEABLE   CREATININE METHODS.    https://jasn.asnjournals.org/content/early/2021/09/22/ASN.[email or web address]      Calcium 10/10/2022 9.8  8.6 - 10.6 mg/dL Final    Albumin 10/10/2022 4.8  3.4 - 5.0 g/dL Final    Alkaline Phosphatase 10/10/2022 38  33 - 110 U/L Final    Total Protein 10/10/2022 7.5  6.4 - 8.2 g/dL Final    AST 10/10/2022 14  9 - 39 U/L Final    Total Bilirubin 10/10/2022 0.9  0.0 - 1.2  mg/dL Final    ALT (SGPT) 10/10/2022 7  7 - 45 U/L Final    Comment:  Patients treated with Sulfasalazine may generate    falsely decreased results for ALT.     Legacy Encounter on 08/19/2022   Component Date Value Ref Range Status    WBC 08/19/2022 4.3 (L)  4.4 - 11.3 x10E9/L Final    nRBC 08/19/2022 0.0  0.0 - 0.0 /100 WBC Final    RBC 08/19/2022 4.87  4.00 - 5.20 x10E12/L Final    Hemoglobin 08/19/2022 15.2  12.0 - 16.0 g/dL Final    Hematocrit 08/19/2022 45.9  36.0 - 46.0 % Final    MCV 08/19/2022 94  80 - 100 fL Final    MCHC 08/19/2022 33.1  32.0 - 36.0 g/dL Final    Platelets 08/19/2022 233  150 - 450 x10E9/L Final    RDW 08/19/2022 11.9  11.5 - 14.5 % Final    Iron 08/19/2022 102  35 - 150 ug/dL Final    TIBC 08/19/2022 345  240 - 445 ug/dL Final    Iron Saturation 08/19/2022 30  25 - 45 % Final    Vitamin D, 25-Hydroxy 08/19/2022 50  ng/mL Final    Comment: .  DEFICIENCY:         < 20   NG/ML  INSUFFICIENCY:      20-29  NG/ML  SUFFICIENCY:         NG/ML    THIS ASSAY ACCURATELY QUANTIFIES THE SUM OF  VITAMIN D3, 25-HYDROXY AND VIT D2,25-HYDROXY.      Glucose 08/19/2022 97  74 - 99 mg/dL Final    Sodium 08/19/2022 141  136 - 145 mmol/L Final    Potassium 08/19/2022 4.4  3.5 - 5.3 mmol/L Final    Chloride 08/19/2022 103  98 - 107 mmol/L Final    Bicarbonate 08/19/2022 28  21 - 32 mmol/L Final    Anion Gap 08/19/2022 14  10 - 20 mmol/L Final    Urea Nitrogen 08/19/2022 12  6 - 23 mg/dL Final    Creatinine 08/19/2022 0.84  0.50 - 1.05 mg/dL Final    GFR Female 08/19/2022 >90  >90 mL/min/1.73m2 Final    Comment:  CALCULATIONS OF ESTIMATED GFR ARE PERFORMED   USING THE 2021 CKD-EPI STUDY REFIT EQUATION   WITHOUT THE RACE VARIABLE FOR THE IDMS-TRACEABLE   CREATININE METHODS.    https://jasn.asnjournals.org/content/early/2021/09/22/ASN.[email or web address]      Calcium 08/19/2022 10.4  8.6 - 10.6 mg/dL Final    Ferritin 08/19/2022 32  8 - 150 ug/L Final    TSH 08/19/2022 1.24  0.44 - 3.98 mIU/L Final    Comment:  TSH  testing is performed using different testing    methodology at [facility] than at other    system [facility]. Direct result comparisons should    only be made within the same method.     Legacy Encounter on 07/06/2022   Component Date Value Ref Range Status    WBC 07/06/2022 8.3  4.4 - 11.3 x10E9/L Final    nRBC 07/06/2022 0.0  0.0 - 0.0 /100 WBC Final    RBC 07/06/2022 4.61  4.00 - 5.20 x10E12/L Final    Hemoglobin 07/06/2022 14.1  12.0 - 16.0 g/dL Final    Hematocrit 07/06/2022 42.0  36.0 - 46.0 % Final    MCV 07/06/2022 91  80 - 100 fL Final    MCHC 07/06/2022 33.6  32.0 - 36.0 g/dL Final    Platelets 07/06/2022 261  150 - 450 x10E9/L Final    RDW 07/06/2022 11.7  11.5 - 14.5 % Final    HSV 1 IgG 07/06/2022 <0.2  INDEX Final    Comment: REF VALUES  NEGATIVE   <0.9  EQUIVOCAL  >=0.90  <=1.10  POSITIVE   >1.10      HSV 2 IgG 07/06/2022 <0.2  INDEX Final    Comment: POTENTIAL FOR CROSS-REACTIVITY BETWEEN  HSV I AND HSV II EXISTS.  REF VALUES  NEGATIVE   <0.9  EQUIVOCAL  >=0.90  <=1.10  POSITIVE   >1.10      TSH 07/06/2022 0.90  0.44 - 3.98 mIU/L Final    Comment:  TSH testing is performed using different testing    methodology at [facility] than at other    [facility]. Direct result comparisons should    only be made within the same method.      Glucose 07/06/2022 150 (H)  74 - 99 mg/dL Final    Sodium 07/06/2022 138  136 - 145 mmol/L Final    Potassium 07/06/2022 4.2  3.5 - 5.3 mmol/L Final    Chloride 07/06/2022 102  98 - 107 mmol/L Final    Bicarbonate 07/06/2022 27  21 - 32 mmol/L Final    Anion Gap 07/06/2022 13  10 - 20 mmol/L Final    Urea Nitrogen 07/06/2022 8  6 - 23 mg/dL Final    Creatinine 07/06/2022 0.79  0.50 - 1.05 mg/dL Final    GFR Female 07/06/2022 >90  >90 mL/min/1.73m2 Final    Comment:  CALCULATIONS OF ESTIMATED GFR ARE PERFORMED   USING THE 2021 CKD-EPI STUDY REFIT EQUATION   WITHOUT THE RACE VARIABLE FOR THE IDMS-TRACEABLE   CREATININE  METHODS.    https://jasn.asnjournals.org/content/early/2021/09/22/ASN.[email or web address]      Calcium 07/06/2022 9.6  8.6 - 10.6 mg/dL Final    Vitamin D, 25-Hydroxy 07/06/2022 25 (A)  ng/mL Final    Comment: .  DEFICIENCY:         < 20   NG/ML  INSUFFICIENCY:      20-29  NG/ML  SUFFICIENCY:         NG/ML    THIS ASSAY ACCURATELY QUANTIFIES THE SUM OF  VITAMIN D3, 25-HYDROXY AND VIT D2,25-HYDROXY.     Legacy Encounter on 06/24/2022   Component Date Value Ref Range Status    HIV 1 and 2 Screen 06/24/2022 NONREACTIVE  NONREACTIVE Final    Comment:  HIV Ag/Ab screen is performed using the Siemens QuanDx   HIV Ag/Ab Combo assay which detects the presence of HIV    p24 antigen as well as antibodies to HIV-1   (Group M and O) and HIV-2.  .  No laboratory evidence of HIV infection. If acute HIV infection is   suspected, consider testing for HIV RNA by PCR (viral load).     Legacy Encounter on 06/16/2022   Component Date Value Ref Range Status    Tobacco Screen, Urine 06/16/2022 NEGATIVE   Final    Comment: Cotinine, a metabolite of nicotine, is measured to screen   for nicotine exposure. The cut-off is set at 300ng/mL to   detect active exposure (smoking).    This test was developed and its performance characteristics   were determined by the [facility].       No current outpatient medications on file prior to visit.     No current facility-administered medications on file prior to visit.     No images are attached to the encounter.            Assessment/Plan   {[email or web address]:[id number]}

## 2023-05-01 ENCOUNTER — OFFICE VISIT (OUTPATIENT)
Dept: PRIMARY CARE | Facility: CLINIC | Age: 23
End: 2023-05-01
Payer: COMMERCIAL

## 2023-05-01 VITALS — SYSTOLIC BLOOD PRESSURE: 112 MMHG | DIASTOLIC BLOOD PRESSURE: 70 MMHG | HEART RATE: 70 BPM

## 2023-05-01 DIAGNOSIS — R63.0 ANOREXIA: ICD-10-CM

## 2023-05-01 DIAGNOSIS — F50.00 ANOREXIA NERVOSA (MULTI): Primary | ICD-10-CM

## 2023-05-01 PROBLEM — F32.A DEPRESSION: Status: ACTIVE | Noted: 2021-09-15

## 2023-05-01 PROBLEM — G43.009 MIGRAINE WITHOUT AURA AND WITHOUT STATUS MIGRAINOSUS, NOT INTRACTABLE: Status: ACTIVE | Noted: 2021-09-15

## 2023-05-01 PROCEDURE — 99214 OFFICE O/P EST MOD 30 MIN: CPT | Performed by: FAMILY MEDICINE

## 2023-05-01 PROCEDURE — 1036F TOBACCO NON-USER: CPT | Performed by: FAMILY MEDICINE

## 2023-05-01 RX ORDER — ALBUTEROL SULFATE 90 UG/1
2 AEROSOL, METERED RESPIRATORY (INHALATION) EVERY 4 HOURS PRN
COMMUNITY
Start: 2022-11-27

## 2023-05-01 RX ORDER — ACETAMINOPHEN 500 MG
TABLET ORAL
COMMUNITY
Start: 2022-07-13 | End: 2023-05-01

## 2023-05-01 NOTE — PROGRESS NOTES
Subjective   Patient ID: Whit Chacko is a 23 y.o. female who presents for physical exam.     HPI   Patient was at the [facility] February through May.  She was then sent to [facility].  Was diagnosed with anorexia September 2021 was seeing a therapist weekly and dietitian for through the month of October 2022.    Currently patient is still having thoughts about restriction and body image.  She is a self-proclaimed perfectionist.  She then saw another dietitian that was very unhelpful.  She also started her therapist again in April 2022 weekly.  This is Geena Rios through the [facility].  Apparently in December 2022 she started food aversion again and did this for 3 weeks.  She was only eating a small snack daily for 3 weeks.  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  She started his new job March 2023.  She wants to see dietician in [place] over zoom to discuss possible trigger foods.       Fam Hx  Mom side heart issues  Mom ()  Dad ()  Sister  Sister Bipolar  Brother  Brother    Exercise walks, plays tennis  ETOH denies  Caffeine denies  Tobacco denies    Mammogram @ 40  DEXA @ 65  Colonoscopy @ 45    Patient denies other complaints.    Review of Systems  Denies any other complaints at this time.     Objective   /70 (BP Location: Left arm)   Pulse 70   BSA There is no height or weight on file to calculate BSA.      Physical Exam  General alert no distress  HEENT EOMI PERRLA  Heart no murmurs rubs gallops appreciated  Lung no wheezes no rales no rhonchi appreciated  Abdomen soft tender no rebound rigidity or guarding appreciated    Legacy Encounter on 02/13/2023   Component Date Value Ref Range Status    Color, Urine 02/13/2023 YELLOW  STRAW,YELLOW Final    Appearance, Urine 02/13/2023 CLEAR  CLEAR Final    Specific Gravity, Urine 02/13/2023 1.020  1.005 - 1.035 Final    pH,  Urine 02/13/2023 5.0  5.0 - 8.0 Final    Protein, Urine 02/13/2023 NEGATIVE  NEGATIVE mg/dL Final    Glucose, Urine 02/13/2023 NEGATIVE  NEGATIVE mg/dL Final    Blood, Urine 02/13/2023 NEGATIVE  NEGATIVE Final    Ketones, Urine 02/13/2023 NEGATIVE  NEGATIVE mg/dL Final    Bilirubin, Urine 02/13/2023 NEGATIVE  NEGATIVE Final    Urobilinogen, Urine 02/13/2023 <2.0  0.0 - 1.9 mg/dL Final    Nitrite, Urine 02/13/2023 NEGATIVE  NEGATIVE Final    Leukocyte Esterase, Urine 02/13/2023 NEGATIVE  NEGATIVE Final    Glucose 02/13/2023 95  74 - 99 mg/dL Final    Sodium 02/13/2023 139  136 - 145 mmol/L Final    Potassium 02/13/2023 4.6  3.5 - 5.3 mmol/L Final    Chloride 02/13/2023 103  98 - 107 mmol/L Final    Bicarbonate 02/13/2023 28  21 - 32 mmol/L Final    Anion Gap 02/13/2023 13  10 - 20 mmol/L Final    Urea Nitrogen 02/13/2023 12  6 - 23 mg/dL Final    Creatinine 02/13/2023 0.85  0.50 - 1.05 mg/dL Final    GFR Female 02/13/2023 >90  >90 mL/min/1.73m2 Final    Comment:  CALCULATIONS OF ESTIMATED GFR ARE PERFORMED   USING THE 2021 CKD-EPI STUDY REFIT EQUATION   WITHOUT THE RACE VARIABLE FOR THE IDMS-TRACEABLE   CREATININE METHODS.    https://jasn.asnjournals.org/content/early/2021/09/22/ASN.5877252178      Calcium 02/13/2023 10.1  8.6 - 10.6 mg/dL Final    Albumin 02/13/2023 4.9  3.4 - 5.0 g/dL Final    Alkaline Phosphatase 02/13/2023 38  33 - 110 U/L Final    Total Protein 02/13/2023 7.4  6.4 - 8.2 g/dL Final    AST 02/13/2023 54 (H)  9 - 39 U/L Final    Total Bilirubin 02/13/2023 0.7  0.0 - 1.2 mg/dL Final    ALT (SGPT) 02/13/2023 17  7 - 45 U/L Final    Comment:  Patients treated with Sulfasalazine may generate    falsely decreased results for ALT.      WBC 02/13/2023 4.5  4.4 - 11.3 x10E9/L Final    nRBC 02/13/2023 0.0  0.0 - 0.0 /100 WBC Final    RBC 02/13/2023 4.81  4.00 - 5.20 x10E12/L Final    Hemoglobin 02/13/2023 14.4  12.0 - 16.0 g/dL Final    Hematocrit 02/13/2023 44.2  36.0 - 46.0 % Final    MCV 02/13/2023 92   80 - 100 fL Final    MCHC 02/13/2023 32.6  32.0 - 36.0 g/dL Final    Platelets 02/13/2023 271  150 - 450 x10E9/L Final    RDW 02/13/2023 12.1  11.5 - 14.5 % Final    Neutrophils % 02/13/2023 56.9  40.0 - 80.0 % Final    Immature Granulocytes %, Automated 02/13/2023 0.2  0.0 - 0.9 % Final    Comment:  Immature Granulocyte Count (IG) includes promyelocytes,    myelocytes and metamyelocytes but does not include bands.   Percent differential counts (%) should be interpreted in the   context of the absolute cell counts (cells/L).      Lymphocytes % 02/13/2023 32.8  13.0 - 44.0 % Final    Monocytes % 02/13/2023 6.5  2.0 - 10.0 % Final    Eosinophils % 02/13/2023 2.5  0.0 - 6.0 % Final    Basophils % 02/13/2023 1.1  0.0 - 2.0 % Final    Neutrophils Absolute 02/13/2023 2.55  1.20 - 7.70 x10E9/L Final    Lymphocytes Absolute 02/13/2023 1.47  1.20 - 4.80 x10E9/L Final    Monocytes Absolute 02/13/2023 0.29  0.10 - 1.00 x10E9/L Final    Eosinophils Absolute 02/13/2023 0.11  0.00 - 0.70 x10E9/L Final    Basophils Absolute 02/13/2023 0.05  0.00 - 0.10 x10E9/L Final    Phosphorus 02/13/2023 3.8  2.5 - 4.9 mg/dL Final    Comment:  The performance characteristics of phosphorus testing in   heparinized plasma have been validated by the individual     laboratory site where testing is performed. Testing    on heparinized plasma is not approved by the FDA;    however, such approval is not necessary.      Magnesium 02/13/2023 2.18  1.60 - 2.40 mg/dL Final   Legacy Encounter on 12/27/2022   Component Date Value Ref Range Status    Hepatitis C Ab 12/27/2022 NONREACTIVE  NONREACTIVE Final    Comment:  Results from patients taking biotin supplements or receiving   high-dose biotin therapy should be interpreted with caution   due to possible interference with this test. Providers may    contact their local laboratory for further information.     Legacy Encounter on 12/08/2022   Component Date Value Ref Range Status    DRUG SCREEN COMMENT  URINE 12/08/2022 SEE BELOW   Final    Comment: Drug screen results are presumptive and should not be used to assess   compliance with prescribed medication. Definitive confirmatory drug testing   has been added to this sample for any positive screen result and will be   reported separately.   .  Toxicology screening results are reported qualitatively. The concentration   must be greater than or equal to the cutoff to be reported as positive. The   concentration at which the screening test can detect an individual drug or   metabolite varies. The absence of expected drug(s) and/or drug metabolite(s)   may indicate non-compliance, inappropriate timing of specimen collection   relative to drug administration, poor drug absorption, diluted/adulterated   urine, or limitations of testing. For medical purposes only; not valid for   forensic use.   .  Interpretive questions should be directed to the laboratory medical   directors.        Amphetamine Screen, Urine 12/08/2022 PRESUMPTIVE NEGATIVE  NEGATIVE Final    Comment:  CUTOFF LEVEL:  500 NG/ML   Cross-reactivity has been reported with high concentrations   of the following drugs: buproprion, chloroquine, chlorpromazine,   ephedrine, mephentermine, fenfluramine, phentermine,   phenylpropanolamine, pseudoephedrine, and propranolol.      Barbiturate Screen, Urine 12/08/2022 PRESUMPTIVE NEGATIVE  NEGATIVE Final     CUTOFF LEVEL: 200 NG/ML    BENZODIAZEPINE (PRESENCE) IN URINE* 12/08/2022 PRESUMPTIVE NEGATIVE  NEGATIVE Final     CUTOFF LEVEL: 200 NG/ML    Cannabinoid Screen, Urine 12/08/2022 PRESUMPTIVE NEGATIVE  NEGATIVE Final     CUTOFF LEVEL: 50 NG/ML    Cocaine Screen, Urine 12/08/2022 PRESUMPTIVE NEGATIVE  NEGATIVE Final     CUTOFF LEVEL: 150 NG/ML    Fentanyl, Ur 12/08/2022 PRESUMPTIVE NEGATIVE  NEGATIVE Final      CUTOFF LEVEL:  5 NG/ML    Methadone Screen, Urine 12/08/2022 PRESUMPTIVE NEGATIVE  NEGATIVE Final    Comment:  CUTOFF LEVEL: 150 NG/ML   The metabolite  L-alpha-acetylmethadol (LAAM) is not   detected by this method in concentrations that would   be found in the urine of patients on LAAM therapy.      Opiate Screen, Urine 12/08/2022 PRESUMPTIVE NEGATIVE  NEGATIVE Final    Comment:  CUTOFF LEVEL: 300 NG/ML   The opiate screen does not detect fentanyl, meperidine, or    tramadol. Oxycodone is not consistently detected (refer to   Oxycodone Screen, Urine result).      Oxycodone Screen, Ur 12/08/2022 PRESUMPTIVE NEGATIVE  NEGATIVE Final    Comment:  CUTOFF LEVEL: 100 NG/ML    This test will accurately detect both oxycodone and oxymorphone.           PCP Screen, Urine 12/08/2022 PRESUMPTIVE NEGATIVE  NEGATIVE Final    Comment:  CUTOFF LEVEL:  25 NG/ML   Cross-reactivity has been reported with dextromethorphan.     Legacy Encounter on 11/25/2022   Component Date Value Ref Range Status    SARS-COV-2 RESULT 11/25/2022 NOT DETECTED  Not Detected Final    Comment: .  This test has received FDA Emergency Use Authorization (EUA) and has been   verified by [facility]. This test is only   authorized for the duration of time that circumstances exist to justify the   authorization of the emergency use of in vitro diagnostic tests for the   detection of SARS-CoV-2 virus and/or diagnosis of COVID-19 infection under   section 564(b)(1) of the Act, 21 U.S.C. 360bbb-3(b)(1), unless the   authorization is terminated or revoked sooner.     [facility] is certified under CLIA-88 as   qualified to perform high complexity testing. Testing is performed in the   [facility] laboratory located at [address] [place] [address].    SARS-CoV-2/Flu/RSV Multiplex Test:   Fact sheet for providers: https://www.fda.gov/media/219821/download  Fact sheet for patients: https://www.fda.gov/media/243553/download     Legacy Encounter on 10/10/2022   Component Date Value Ref Range Status    Cholesterol 10/10/2022 148  0 - 199 mg/dL  Final    Comment: .      AGE      DESIRABLE   BORDERLINE HIGH   HIGH     0-19 Y     0 - 169       170 - 199     >/= 200    20-24 Y     0 - 189       190 - 224     >/= 225         >24 Y     0 - 199       200 - 239     >/= 240   **All ranges are based on fasting samples. Specific   therapeutic targets will vary based on patient-specific   cardiac risk.  .   Pediatric guidelines reference:Pediatrics 2011, 128(S5).   Adult guidelines reference: NCEP ATPIII Guidelines,     ALMA ROSA 2001, 258:2486-97  .   Venipuncture immediately after or during the    administration of Metamizole may lead to falsely   low results. Testing should be performed immediately   prior to Metamizole dosing.      HDL 10/10/2022 61.8  mg/dL Final    Comment: .      AGE      VERY LOW   LOW     NORMAL    HIGH       0-19 Y       < 35   < 40     40-45     ----    20-24 Y       ----   < 40       >45     ----      >24 Y       ----   < 40     40-60      >60  .      Cholesterol/HDL Ratio 10/10/2022 2.4   Final    Comment: REF VALUES  DESIRABLE  < 3.4  HIGH RISK  > 5.0      LDL 10/10/2022 78  0 - 119 mg/dL Final    Comment: .                           NEAR      BORD      AGE      DESIRABLE  OPTIMAL    HIGH     HIGH     VERY HIGH     0-19 Y     0 - 109     ---    110-129   >/= 130     ----    20-24 Y     0 - 119     ---    120-159   >/= 160     ----      >24 Y     0 -  99   100-129  130-159   160-189     >/=190  .      VLDL 10/10/2022 8  0 - 40 mg/dL Final    Triglycerides 10/10/2022 42  0 - 149 mg/dL Final    Comment: .      AGE      DESIRABLE   BORDERLINE HIGH   HIGH     VERY HIGH   0 D-90 D    19 - 174         ----         ----        ----  91 D- 9 Y     0 -  74        75 -  99     >/= 100      ----    10-19 Y     0 -  89        90 - 129     >/= 130      ----    20-24 Y     0 - 114       115 - 149     >/= 150      ----         >24 Y     0 - 149       150 - 199    200- 499    >/= 500  .   Venipuncture immediately after or during the    administration of  Metamizole may lead to falsely   low results. Testing should be performed immediately   prior to Metamizole dosing.      Non HDL Cholesterol 10/10/2022 86  0 - 149 mg/dL Final    Comment:     AGE      DESIRABLE   BORDERLINE HIGH   HIGH     VERY HIGH     0-19 Y     0 - 119       120 - 144     >/= 145    >/= 160    20-24 Y     0 - 149       150 - 189     >/= 190      ----         >24 Y    30 MG/DL ABOVE LDL CHOLESTEROL GOAL  .      Glucose 10/10/2022 86  74 - 99 mg/dL Final    Sodium 10/10/2022 140  136 - 145 mmol/L Final    Potassium 10/10/2022 4.0  3.5 - 5.3 mmol/L Final    Chloride 10/10/2022 103  98 - 107 mmol/L Final    Bicarbonate 10/10/2022 28  21 - 32 mmol/L Final    Anion Gap 10/10/2022 13  10 - 20 mmol/L Final    Urea Nitrogen 10/10/2022 8  6 - 23 mg/dL Final    Creatinine 10/10/2022 0.71  0.50 - 1.05 mg/dL Final    GFR Female 10/10/2022 >90  >90 mL/min/1.73m2 Final    Comment:  CALCULATIONS OF ESTIMATED GFR ARE PERFORMED   USING THE 2021 CKD-EPI STUDY REFIT EQUATION   WITHOUT THE RACE VARIABLE FOR THE IDMS-TRACEABLE   CREATININE METHODS.    https://jasn.asnjournals.org/content/early/2021/09/22/ASN.1305783378      Calcium 10/10/2022 9.8  8.6 - 10.6 mg/dL Final    Albumin 10/10/2022 4.8  3.4 - 5.0 g/dL Final    Alkaline Phosphatase 10/10/2022 38  33 - 110 U/L Final    Total Protein 10/10/2022 7.5  6.4 - 8.2 g/dL Final    AST 10/10/2022 14  9 - 39 U/L Final    Total Bilirubin 10/10/2022 0.9  0.0 - 1.2 mg/dL Final    ALT (SGPT) 10/10/2022 7  7 - 45 U/L Final    Comment:  Patients treated with Sulfasalazine may generate    falsely decreased results for ALT.     Legacy Encounter on 08/19/2022   Component Date Value Ref Range Status    WBC 08/19/2022 4.3 (L)  4.4 - 11.3 x10E9/L Final    nRBC 08/19/2022 0.0  0.0 - 0.0 /100 WBC Final    RBC 08/19/2022 4.87  4.00 - 5.20 x10E12/L Final    Hemoglobin 08/19/2022 15.2  12.0 - 16.0 g/dL Final    Hematocrit 08/19/2022 45.9  36.0 - 46.0 % Final    MCV 08/19/2022 94  80 -  100 fL Final    MCHC 08/19/2022 33.1  32.0 - 36.0 g/dL Final    Platelets 08/19/2022 233  150 - 450 x10E9/L Final    RDW 08/19/2022 11.9  11.5 - 14.5 % Final    Iron 08/19/2022 102  35 - 150 ug/dL Final    TIBC 08/19/2022 345  240 - 445 ug/dL Final    Iron Saturation 08/19/2022 30  25 - 45 % Final    Vitamin D, 25-Hydroxy 08/19/2022 50  ng/mL Final    Comment: .  DEFICIENCY:         < 20   NG/ML  INSUFFICIENCY:      20-29  NG/ML  SUFFICIENCY:         NG/ML    THIS ASSAY ACCURATELY QUANTIFIES THE SUM OF  VITAMIN D3, 25-HYDROXY AND VIT D2,25-HYDROXY.      Glucose 08/19/2022 97  74 - 99 mg/dL Final    Sodium 08/19/2022 141  136 - 145 mmol/L Final    Potassium 08/19/2022 4.4  3.5 - 5.3 mmol/L Final    Chloride 08/19/2022 103  98 - 107 mmol/L Final    Bicarbonate 08/19/2022 28  21 - 32 mmol/L Final    Anion Gap 08/19/2022 14  10 - 20 mmol/L Final    Urea Nitrogen 08/19/2022 12  6 - 23 mg/dL Final    Creatinine 08/19/2022 0.84  0.50 - 1.05 mg/dL Final    GFR Female 08/19/2022 >90  >90 mL/min/1.73m2 Final    Comment:  CALCULATIONS OF ESTIMATED GFR ARE PERFORMED   USING THE 2021 CKD-EPI STUDY REFIT EQUATION   WITHOUT THE RACE VARIABLE FOR THE IDMS-TRACEABLE   CREATININE METHODS.    https://jasn.asnjournals.org/content/early/2021/09/22/ASN.[email or web address]      Calcium 08/19/2022 10.4  8.6 - 10.6 mg/dL Final    Ferritin 08/19/2022 32  8 - 150 ug/L Final    TSH 08/19/2022 1.24  0.44 - 3.98 mIU/L Final    Comment:  TSH testing is performed using different testing    methodology at [facility] than at other    [facility] hospitals. Direct result comparisons should    only be made within the same method.     Legacy Encounter on 07/06/2022   Component Date Value Ref Range Status    WBC 07/06/2022 8.3  4.4 - 11.3 x10E9/L Final    nRBC 07/06/2022 0.0  0.0 - 0.0 /100 WBC Final    RBC 07/06/2022 4.61  4.00 - 5.20 x10E12/L Final    Hemoglobin 07/06/2022 14.1  12.0 - 16.0 g/dL Final    Hematocrit 07/06/2022 42.0  36.0 - 46.0 %  Final    MCV 07/06/2022 91  80 - 100 fL Final    MCHC 07/06/2022 33.6  32.0 - 36.0 g/dL Final    Platelets 07/06/2022 261  150 - 450 x10E9/L Final    RDW 07/06/2022 11.7  11.5 - 14.5 % Final    HSV 1 IgG 07/06/2022 <0.2  INDEX Final    Comment: REF VALUES  NEGATIVE   <0.9  EQUIVOCAL  >=0.90  <=1.10  POSITIVE   >1.10      HSV 2 IgG 07/06/2022 <0.2  INDEX Final    Comment: POTENTIAL FOR CROSS-REACTIVITY BETWEEN  HSV I AND HSV II EXISTS.  REF VALUES  NEGATIVE   <0.9  EQUIVOCAL  >=0.90  <=1.10  POSITIVE   >1.10      TSH 07/06/2022 0.90  0.44 - 3.98 mIU/L Final    Comment:  TSH testing is performed using different testing    methodology at [facility] than at other    [facility] hospitals. Direct result comparisons should    only be made within the same method.      Glucose 07/06/2022 150 (H)  74 - 99 mg/dL Final    Sodium 07/06/2022 138  136 - 145 mmol/L Final    Potassium 07/06/2022 4.2  3.5 - 5.3 mmol/L Final    Chloride 07/06/2022 102  98 - 107 mmol/L Final    Bicarbonate 07/06/2022 27  21 - 32 mmol/L Final    Anion Gap 07/06/2022 13  10 - 20 mmol/L Final    Urea Nitrogen 07/06/2022 8  6 - 23 mg/dL Final    Creatinine 07/06/2022 0.79  0.50 - 1.05 mg/dL Final    GFR Female 07/06/2022 >90  >90 mL/min/1.73m2 Final    Comment:  CALCULATIONS OF ESTIMATED GFR ARE PERFORMED   USING THE 2021 CKD-EPI STUDY REFIT EQUATION   WITHOUT THE RACE VARIABLE FOR THE IDMS-TRACEABLE   CREATININE METHODS.    https://jasn.asnjournals.org/content/early/2021/09/22/ASN.5206515961      Calcium 07/06/2022 9.6  8.6 - 10.6 mg/dL Final    Vitamin D, 25-Hydroxy 07/06/2022 25 (A)  ng/mL Final    Comment: .  DEFICIENCY:         < 20   NG/ML  INSUFFICIENCY:      20-29  NG/ML  SUFFICIENCY:         NG/ML    THIS ASSAY ACCURATELY QUANTIFIES THE SUM OF  VITAMIN D3, 25-HYDROXY AND VIT D2,25-HYDROXY.     Legacy Encounter on 06/24/2022   Component Date Value Ref Range Status    HIV 1 and 2 Screen 06/24/2022 NONREACTIVE  NONREACTIVE Final     Comment:  HIV Ag/Ab screen is performed using the Siemens [employer]   HIV Ag/Ab Combo assay which detects the presence of HIV    p24 antigen as well as antibodies to HIV-1   (Group M and O) and HIV-2.  .  No laboratory evidence of HIV infection. If acute HIV infection is   suspected, consider testing for HIV RNA by PCR (viral load).     Legacy Encounter on 06/16/2022   Component Date Value Ref Range Status    Tobacco Screen, Urine 06/16/2022 NEGATIVE   Final    Comment: Cotinine, a metabolite of nicotine, is measured to screen   for nicotine exposure. The cut-off is set at 300ng/mL to   detect active exposure (smoking).    This test was developed and its performance characteristics   were determined by the [facility].       Current Outpatient Medications on File Prior to Visit   Medication Sig Dispense Refill    albuterol 90 mcg/actuation inhaler Inhale 2 puffs every 4 hours if needed.      [DISCONTINUED] cholecalciferol (Vitamin D-3) 5,000 Units tablet        No current facility-administered medications on file prior to visit.     No images are attached to the encounter.            Assessment/Plan   Diagnoses and all orders for this visit:  Anorexia nervosa  Anorexia  -     Follow Up In [facility] - PCP      1.  Patient is going to have DEXA  2.  Patient to see dietician in [place] (zoom)  3.  Patient to call if questions or concerns

## 2023-05-19 ENCOUNTER — TELEPHONE (OUTPATIENT)
Dept: PRIMARY CARE | Facility: CLINIC | Age: 23
End: 2023-05-19
Payer: COMMERCIAL

## 2023-05-25 ENCOUNTER — OFFICE VISIT (OUTPATIENT)
Dept: PRIMARY CARE | Facility: CLINIC | Age: 23
End: 2023-05-25
Payer: COMMERCIAL

## 2023-05-25 ENCOUNTER — TELEPHONE (OUTPATIENT)
Dept: PRIMARY CARE | Facility: CLINIC | Age: 23
End: 2023-05-25

## 2023-05-25 ENCOUNTER — TELEPHONE (OUTPATIENT)
Dept: PRIMARY CARE | Facility: CLINIC | Age: 23
End: 2023-05-25
Payer: COMMERCIAL

## 2023-05-25 VITALS
HEIGHT: 65 IN | HEART RATE: 93 BPM | BODY MASS INDEX: 20.16 KG/M2 | TEMPERATURE: 98.2 F | WEIGHT: 121 LBS | OXYGEN SATURATION: 98 % | SYSTOLIC BLOOD PRESSURE: 123 MMHG | DIASTOLIC BLOOD PRESSURE: 81 MMHG

## 2023-05-25 DIAGNOSIS — R42 LIGHTHEADEDNESS: Primary | ICD-10-CM

## 2023-05-25 PROCEDURE — 1036F TOBACCO NON-USER: CPT | Performed by: STUDENT IN AN ORGANIZED HEALTH CARE EDUCATION/TRAINING PROGRAM

## 2023-05-25 PROCEDURE — 99214 OFFICE O/P EST MOD 30 MIN: CPT | Performed by: STUDENT IN AN ORGANIZED HEALTH CARE EDUCATION/TRAINING PROGRAM

## 2023-05-25 ASSESSMENT — ENCOUNTER SYMPTOMS
NAUSEA: 1
ARTHRALGIAS: 0
FREQUENCY: 0
SINUS PRESSURE: 0
SHORTNESS OF BREATH: 0
CHILLS: 0
DYSURIA: 0
PHOTOPHOBIA: 0
ABDOMINAL PAIN: 1
MYALGIAS: 0
LIGHT-HEADEDNESS: 1
CONSTIPATION: 0
DIZZINESS: 0
HEADACHES: 0
DIARRHEA: 1
VOMITING: 0
SINUS PAIN: 0
FEVER: 0
FATIGUE: 0
RHINORRHEA: 0
COUGH: 0
PALPITATIONS: 0

## 2023-05-25 NOTE — PROGRESS NOTES
Subjective   Patient ID: Whit Chacko is a 23 y.o. female who presents for Dizziness (Last night at work ).    Dizziness  Associated symptoms include abdominal pain (discomfort) and nausea. Pertinent negatives include no arthralgias, chest pain, chills, congestion, coughing, fatigue, fever, headaches, myalgias or vomiting.        She works nights at [employer]  About an hour after starting work last night she was getting dizzy.  It was intermittent and coming and going in waves.  She laid down and they checked her blood pressures and it was fine. 128/76.  She went to the bathroom and then felt warm and flushed.  She went back to lay down.  She was still having symptoms so they sent her home from work.    She slept when she went back home but tossed and turned a lot.  This morning she felt a little bit better than she did last night.  She is still having some nausea.  She did not eat very much today. She had some soup earlier.    She recently switched from days to nights at the beginning of May. She works 3, 12 hour shifts weekly.    She thinks that she had this before. Years ago she had an episode where she almost passed out. She had an EKG and an Echo at that time. All other testing was normal. No other symptoms since.    Review of Systems   Constitutional:  Negative for chills, fatigue and fever.   HENT:  Positive for postnasal drip. Negative for congestion, rhinorrhea, sinus pressure and sinus pain.    Eyes:  Negative for photophobia and visual disturbance.   Respiratory:  Negative for cough and shortness of breath.    Cardiovascular:  Negative for chest pain and palpitations.   Gastrointestinal:  Positive for abdominal pain (discomfort), diarrhea (loose) and nausea. Negative for constipation and vomiting.   Genitourinary:  Negative for dysuria and frequency.   Musculoskeletal:  Negative for arthralgias and myalgias.   Neurological:  Positive for light-headedness. Negative for dizziness and headaches.  "      Objective   /81   Pulse 93   Temp 36.8 °C (98.2 °F)   Ht 1.657 m (5' 5.25\")   Wt 54.9 kg (121 lb)   SpO2 98%   BMI 19.98 kg/m²     Physical Exam  Vitals and nursing note reviewed.   Constitutional:       General: She is not in acute distress.     Appearance: Normal appearance. She is normal weight. She is not ill-appearing or toxic-appearing.   HENT:      Head: Normocephalic and atraumatic.   Eyes:      Extraocular Movements: Extraocular movements intact.      Pupils: Pupils are equal, round, and reactive to light.   Cardiovascular:      Rate and Rhythm: Normal rate and regular rhythm.      Heart sounds: Normal heart sounds.   Pulmonary:      Effort: Pulmonary effort is normal.      Breath sounds: Normal breath sounds.   Abdominal:      General: Abdomen is flat. Bowel sounds are normal.      Palpations: Abdomen is soft.   Neurological:      General: No focal deficit present.      Mental Status: She is alert and oriented to person, place, and time. Mental status is at baseline.      Comments: Wale-Hallpike maneuver negative.         Assessment/Plan   Problem List Items Addressed This Visit    None  Visit Diagnoses       Lightheadedness    -  Primary    Relevant Orders    CBC    Comprehensive metabolic panel    Magnesium    Phosphorus          History and physical examination as above.  Patient with lightheadedness with uncertain etiology.  We will start with lab work including CBC, CMP as well as magnesium and phosphorus.  Discussed oral electrolyte replacement solutions that she can use over-the-counter.  She denies any palpitations or chest discomfort.  She had some nausea last night that is since resolved.  She has been having some loose stools as well and is wondering if she had picked up a GI bug which had to hydrated her.  She would like to defer any other testing at this time besides the lab work.  Patient to return if her symptoms persist or worsen.  Would likely then recommend additional " work-up at that time.

## 2023-05-25 NOTE — TELEPHONE ENCOUNTER
Pt left a message saying that last night at work, she was dizzy & she felt warm & flushed. She did not have a fever; work sent her home & recommended that she go to the Urgent Care.   Pt did not go; she left a message w/ me, asking if I agreed w/ their recommendation.   I told her that it depends on how she feels today. She said that she is feeling, ok. I offered her an appt w/ Dr Burrows since Dr Pulido is out of the office. She said that she is going to eat & drink something & see if that helps & she will let us know if she needs an appt.   Says in her chart that she has anorexia. She has not eaten anything today, which could definitely cause dizziness. I did encourage her to come in for an appt & if she feels that poorly; she should go to the ER because she may be dehydrated & need IV fluids.

## 2023-05-25 NOTE — LETTER
May 25, 2023     Patient: Whit Chacko   YOB: 2000   Date of Visit: 5/25/2023       To Whom It May Concern:    Whit Chacko was seen in my clinic on 5/25/2023 at 3:00 pm. Please excuse Whit for her absence from work on this day to make the appointment.    Due to symptoms. she may return to work starting the evening on 5/26/23    If you have any questions or concerns, please don't hesitate to call.         Sincerely,         Fidel Burrows,         CC: No Recipients

## 2023-06-05 ENCOUNTER — OFFICE VISIT (OUTPATIENT)
Dept: PRIMARY CARE | Facility: CLINIC | Age: 23
End: 2023-06-05
Payer: COMMERCIAL

## 2023-06-05 VITALS
SYSTOLIC BLOOD PRESSURE: 110 MMHG | BODY MASS INDEX: 20.18 KG/M2 | DIASTOLIC BLOOD PRESSURE: 68 MMHG | WEIGHT: 122.2 LBS | HEART RATE: 62 BPM

## 2023-06-05 DIAGNOSIS — F32.A DEPRESSION, UNSPECIFIED DEPRESSION TYPE: ICD-10-CM

## 2023-06-05 DIAGNOSIS — F50.00 ANOREXIA NERVOSA (MULTI): Primary | ICD-10-CM

## 2023-06-05 DIAGNOSIS — F41.9 ANXIETY: ICD-10-CM

## 2023-06-05 DIAGNOSIS — E83.52 HYPERCALCEMIA: ICD-10-CM

## 2023-06-05 PROCEDURE — 99214 OFFICE O/P EST MOD 30 MIN: CPT | Performed by: FAMILY MEDICINE

## 2023-06-05 PROCEDURE — 1036F TOBACCO NON-USER: CPT | Performed by: FAMILY MEDICINE

## 2023-06-05 ASSESSMENT — ENCOUNTER SYMPTOMS
COUGH: 0
ACTIVITY CHANGE: 0
COLOR CHANGE: 0
BACK PAIN: 0
DIZZINESS: 0
FATIGUE: 0
PALPITATIONS: 0
HEADACHES: 0
LIGHT-HEADEDNESS: 0
FACIAL ASYMMETRY: 0
FEVER: 0
CHEST TIGHTNESS: 0
ARTHRALGIAS: 0
CHOKING: 0
APPETITE CHANGE: 0

## 2023-06-05 NOTE — PROGRESS NOTES
Subjective   Patient ID: Whit Chacko is a 23 y.o. female who presents for Follow-up (Follow up from Dietician ).    HPI   Patient comes in that she is here to follow-up from recent visit to dietitian.  She had a recent episode of lightheadedness dizziness and almost passing out.  Patient is a known anorexic.    Review of Systems   Constitutional:  Negative for activity change, appetite change, fatigue and fever.   HENT:  Negative for congestion.    Respiratory:  Negative for cough, choking and chest tightness.    Cardiovascular:  Negative for chest pain, palpitations and leg swelling.   Musculoskeletal:  Negative for arthralgias, back pain and gait problem.   Skin:  Negative for color change and pallor.   Neurological:  Negative for dizziness, facial asymmetry, light-headedness and headaches.       Objective   /68 (BP Location: Left arm)   Pulse 62   Wt 55.4 kg (122 lb 3.2 oz)   BMI 20.18 kg/m²   BSA Body surface area is 1.6 meters squared.      Physical Exam  Constitutional:       General: She is not in acute distress.     Appearance: Normal appearance. She is not toxic-appearing.   HENT:      Head: Normocephalic.      Right Ear: Tympanic membrane, ear canal and external ear normal.      Left Ear: Tympanic membrane, ear canal and external ear normal.   Eyes:      Conjunctiva/sclera: Conjunctivae normal.      Pupils: Pupils are equal, round, and reactive to light.   Cardiovascular:      Rate and Rhythm: Normal rate and regular rhythm.      Pulses: Normal pulses.      Heart sounds: Normal heart sounds.   Pulmonary:      Effort: No respiratory distress.      Breath sounds: No wheezing, rhonchi or rales.   Musculoskeletal:         General: No swelling or tenderness.      Cervical back: No tenderness.   Skin:     Findings: No lesion or rash.   Neurological:      General: No focal deficit present.      Mental Status: She is alert and oriented to person, place, and time. Mental status is at baseline.       Gait: Gait normal.   Psychiatric:         Mood and Affect: Mood normal.         Behavior: Behavior normal.         Thought Content: Thought content normal.         Judgment: Judgment normal.       Legacy Encounter on 02/13/2023   Component Date Value Ref Range Status    Color, Urine 02/13/2023 YELLOW  STRAW,YELLOW Final    Appearance, Urine 02/13/2023 CLEAR  CLEAR Final    Specific Gravity, Urine 02/13/2023 1.020  1.005 - 1.035 Final    pH, Urine 02/13/2023 5.0  5.0 - 8.0 Final    Protein, Urine 02/13/2023 NEGATIVE  NEGATIVE mg/dL Final    Glucose, Urine 02/13/2023 NEGATIVE  NEGATIVE mg/dL Final    Blood, Urine 02/13/2023 NEGATIVE  NEGATIVE Final    Ketones, Urine 02/13/2023 NEGATIVE  NEGATIVE mg/dL Final    Bilirubin, Urine 02/13/2023 NEGATIVE  NEGATIVE Final    Urobilinogen, Urine 02/13/2023 <2.0  0.0 - 1.9 mg/dL Final    Nitrite, Urine 02/13/2023 NEGATIVE  NEGATIVE Final    Leukocyte Esterase, Urine 02/13/2023 NEGATIVE  NEGATIVE Final    Glucose 02/13/2023 95  74 - 99 mg/dL Final    Sodium 02/13/2023 139  136 - 145 mmol/L Final    Potassium 02/13/2023 4.6  3.5 - 5.3 mmol/L Final    Chloride 02/13/2023 103  98 - 107 mmol/L Final    Bicarbonate 02/13/2023 28  21 - 32 mmol/L Final    Anion Gap 02/13/2023 13  10 - 20 mmol/L Final    Urea Nitrogen 02/13/2023 12  6 - 23 mg/dL Final    Creatinine 02/13/2023 0.85  0.50 - 1.05 mg/dL Final    GFR Female 02/13/2023 >90  >90 mL/min/1.73m2 Final    Comment:  CALCULATIONS OF ESTIMATED GFR ARE PERFORMED   USING THE 2021 CKD-EPI STUDY REFIT EQUATION   WITHOUT THE RACE VARIABLE FOR THE IDMS-TRACEABLE   CREATININE METHODS.    https://jasn.asnjournals.org/content/early/2021/09/22/ASN.5146003990      Calcium 02/13/2023 10.1  8.6 - 10.6 mg/dL Final    Albumin 02/13/2023 4.9  3.4 - 5.0 g/dL Final    Alkaline Phosphatase 02/13/2023 38  33 - 110 U/L Final    Total Protein 02/13/2023 7.4  6.4 - 8.2 g/dL Final    AST 02/13/2023 54 (H)  9 - 39 U/L Final    Total Bilirubin 02/13/2023 0.7   0.0 - 1.2 mg/dL Final    ALT (SGPT) 02/13/2023 17  7 - 45 U/L Final    Comment:  Patients treated with Sulfasalazine may generate    falsely decreased results for ALT.      WBC 02/13/2023 4.5  4.4 - 11.3 x10E9/L Final    nRBC 02/13/2023 0.0  0.0 - 0.0 /100 WBC Final    RBC 02/13/2023 4.81  4.00 - 5.20 x10E12/L Final    Hemoglobin 02/13/2023 14.4  12.0 - 16.0 g/dL Final    Hematocrit 02/13/2023 44.2  36.0 - 46.0 % Final    MCV 02/13/2023 92  80 - 100 fL Final    MCHC 02/13/2023 32.6  32.0 - 36.0 g/dL Final    Platelets 02/13/2023 271  150 - 450 x10E9/L Final    RDW 02/13/2023 12.1  11.5 - 14.5 % Final    Neutrophils % 02/13/2023 56.9  40.0 - 80.0 % Final    Immature Granulocytes %, Automated 02/13/2023 0.2  0.0 - 0.9 % Final    Comment:  Immature Granulocyte Count (IG) includes promyelocytes,    myelocytes and metamyelocytes but does not include bands.   Percent differential counts (%) should be interpreted in the   context of the absolute cell counts (cells/L).      Lymphocytes % 02/13/2023 32.8  13.0 - 44.0 % Final    Monocytes % 02/13/2023 6.5  2.0 - 10.0 % Final    Eosinophils % 02/13/2023 2.5  0.0 - 6.0 % Final    Basophils % 02/13/2023 1.1  0.0 - 2.0 % Final    Neutrophils Absolute 02/13/2023 2.55  1.20 - 7.70 x10E9/L Final    Lymphocytes Absolute 02/13/2023 1.47  1.20 - 4.80 x10E9/L Final    Monocytes Absolute 02/13/2023 0.29  0.10 - 1.00 x10E9/L Final    Eosinophils Absolute 02/13/2023 0.11  0.00 - 0.70 x10E9/L Final    Basophils Absolute 02/13/2023 0.05  0.00 - 0.10 x10E9/L Final    Phosphorus 02/13/2023 3.8  2.5 - 4.9 mg/dL Final    Comment:  The performance characteristics of phosphorus testing in   heparinized plasma have been validated by the individual     laboratory site where testing is performed. Testing    on heparinized plasma is not approved by the FDA;    however, such approval is not necessary.      Magnesium 02/13/2023 2.18  1.60 - 2.40 mg/dL Final   Legacy Encounter on 12/27/2022   Component  Date Value Ref Range Status    Hepatitis C Ab 12/27/2022 NONREACTIVE  NONREACTIVE Final    Comment:  Results from patients taking biotin supplements or receiving   high-dose biotin therapy should be interpreted with caution   due to possible interference with this test. Providers may    contact their local laboratory for further information.     Legacy Encounter on 12/08/2022   Component Date Value Ref Range Status    DRUG SCREEN COMMENT URINE 12/08/2022 SEE BELOW   Final    Comment: Drug screen results are presumptive and should not be used to assess   compliance with prescribed medication. Definitive confirmatory drug testing   has been added to this sample for any positive screen result and will be   reported separately.   .  Toxicology screening results are reported qualitatively. The concentration   must be greater than or equal to the cutoff to be reported as positive. The   concentration at which the screening test can detect an individual drug or   metabolite varies. The absence of expected drug(s) and/or drug metabolite(s)   may indicate non-compliance, inappropriate timing of specimen collection   relative to drug administration, poor drug absorption, diluted/adulterated   urine, or limitations of testing. For medical purposes only; not valid for   forensic use.   .  Interpretive questions should be directed to the laboratory medical   directors.        Amphetamine Screen, Urine 12/08/2022 PRESUMPTIVE NEGATIVE  NEGATIVE Final    Comment:  CUTOFF LEVEL:  500 NG/ML   Cross-reactivity has been reported with high concentrations   of the following drugs: buproprion, chloroquine, chlorpromazine,   ephedrine, mephentermine, fenfluramine, phentermine,   phenylpropanolamine, pseudoephedrine, and propranolol.      Barbiturate Screen, Urine 12/08/2022 PRESUMPTIVE NEGATIVE  NEGATIVE Final     CUTOFF LEVEL: 200 NG/ML    BENZODIAZEPINE (PRESENCE) IN URINE* 12/08/2022 PRESUMPTIVE NEGATIVE  NEGATIVE Final     CUTOFF  LEVEL: 200 NG/ML    Cannabinoid Screen, Urine 12/08/2022 PRESUMPTIVE NEGATIVE  NEGATIVE Final     CUTOFF LEVEL: 50 NG/ML    Cocaine Screen, Urine 12/08/2022 PRESUMPTIVE NEGATIVE  NEGATIVE Final     CUTOFF LEVEL: 150 NG/ML    Fentanyl, Ur 12/08/2022 PRESUMPTIVE NEGATIVE  NEGATIVE Final      CUTOFF LEVEL:  5 NG/ML    Methadone Screen, Urine 12/08/2022 PRESUMPTIVE NEGATIVE  NEGATIVE Final    Comment:  CUTOFF LEVEL: 150 NG/ML   The metabolite L-alpha-acetylmethadol (LAAM) is not   detected by this method in concentrations that would   be found in the urine of patients on LAAM therapy.      Opiate Screen, Urine 12/08/2022 PRESUMPTIVE NEGATIVE  NEGATIVE Final    Comment:  CUTOFF LEVEL: 300 NG/ML   The opiate screen does not detect fentanyl, meperidine, or    tramadol. Oxycodone is not consistently detected (refer to   Oxycodone Screen, Urine result).      Oxycodone Screen, Ur 12/08/2022 PRESUMPTIVE NEGATIVE  NEGATIVE Final    Comment:  CUTOFF LEVEL: 100 NG/ML    This test will accurately detect both oxycodone and oxymorphone.           PCP Screen, Urine 12/08/2022 PRESUMPTIVE NEGATIVE  NEGATIVE Final    Comment:  CUTOFF LEVEL:  25 NG/ML   Cross-reactivity has been reported with dextromethorphan.     Legacy Encounter on 11/25/2022   Component Date Value Ref Range Status    SARS-COV-2 RESULT 11/25/2022 NOT DETECTED  Not Detected Final    Comment: .  This test has received FDA Emergency Use Authorization (EUA) and has been   verified by [facility]. This test is only   authorized for the duration of time that circumstances exist to justify the   authorization of the emergency use of in vitro diagnostic tests for the   detection of SARS-CoV-2 virus and/or diagnosis of COVID-19 infection under   section 564(b)(1) of the Act, 21 U.S.C. 360bbb-3(b)(1), unless the   authorization is terminated or revoked sooner.     [facility] is certified under CLIA-88 as   qualified  to perform high complexity testing. Testing is performed in the   [facility] laboratory located at [address] [place] [address].    SARS-CoV-2/Flu/RSV Multiplex Test:   Fact sheet for providers: https://www.fda.gov/media/773275/download  Fact sheet for patients: https://www.fda.gov/media/458072/download     Legacy Encounter on 10/10/2022   Component Date Value Ref Range Status    Cholesterol 10/10/2022 148  0 - 199 mg/dL Final    Comment: .      AGE      DESIRABLE   BORDERLINE HIGH   HIGH     0-19 Y     0 - 169       170 - 199     >/= 200    20-24 Y     0 - 189       190 - 224     >/= 225         >24 Y     0 - 199       200 - 239     >/= 240   **All ranges are based on fasting samples. Specific   therapeutic targets will vary based on patient-specific   cardiac risk.  .   Pediatric guidelines reference:Pediatrics 2011, 128(S5).   Adult guidelines reference: NCEP ATPIII Guidelines,     ALMA ROSA 2001, 258:2486-97  .   Venipuncture immediately after or during the    administration of Metamizole may lead to falsely   low results. Testing should be performed immediately   prior to Metamizole dosing.      HDL 10/10/2022 61.8  mg/dL Final    Comment: .      AGE      VERY LOW   LOW     NORMAL    HIGH       0-19 Y       < 35   < 40     40-45     ----    20-24 Y       ----   < 40       >45     ----      >24 Y       ----   < 40     40-60      >60  .      Cholesterol/HDL Ratio 10/10/2022 2.4   Final    Comment: REF VALUES  DESIRABLE  < 3.4  HIGH RISK  > 5.0      LDL 10/10/2022 78  0 - 119 mg/dL Final    Comment: .                           NEAR      BORD      AGE      DESIRABLE  OPTIMAL    HIGH     HIGH     VERY HIGH     0-19 Y     0 - 109     ---    110-129   >/= 130     ----    20-24 Y     0 - 119     ---    120-159   >/= 160     ----      >24 Y     0 -  99   100-129  130-159   160-189     >/=190  .      VLDL 10/10/2022 8  0 - 40 mg/dL Final    Triglycerides 10/10/2022 42  0 - 149 mg/dL Final    Comment: .       AGE      DESIRABLE   BORDERLINE HIGH   HIGH     VERY HIGH   0 D-90 D    19 - 174         ----         ----        ----  91 D- 9 Y     0 -  74        75 -  99     >/= 100      ----    10-19 Y     0 -  89        90 - 129     >/= 130      ----    20-24 Y     0 - 114       115 - 149     >/= 150      ----         >24 Y     0 - 149       150 - 199    200- 499    >/= 500  .   Venipuncture immediately after or during the    administration of Metamizole may lead to falsely   low results. Testing should be performed immediately   prior to Metamizole dosing.      Non HDL Cholesterol 10/10/2022 86  0 - 149 mg/dL Final    Comment:     AGE      DESIRABLE   BORDERLINE HIGH   HIGH     VERY HIGH     0-19 Y     0 - 119       120 - 144     >/= 145    >/= 160    20-24 Y     0 - 149       150 - 189     >/= 190      ----         >24 Y    30 MG/DL ABOVE LDL CHOLESTEROL GOAL  .      Glucose 10/10/2022 86  74 - 99 mg/dL Final    Sodium 10/10/2022 140  136 - 145 mmol/L Final    Potassium 10/10/2022 4.0  3.5 - 5.3 mmol/L Final    Chloride 10/10/2022 103  98 - 107 mmol/L Final    Bicarbonate 10/10/2022 28  21 - 32 mmol/L Final    Anion Gap 10/10/2022 13  10 - 20 mmol/L Final    Urea Nitrogen 10/10/2022 8  6 - 23 mg/dL Final    Creatinine 10/10/2022 0.71  0.50 - 1.05 mg/dL Final    GFR Female 10/10/2022 >90  >90 mL/min/1.73m2 Final    Comment:  CALCULATIONS OF ESTIMATED GFR ARE PERFORMED   USING THE 2021 CKD-EPI STUDY REFIT EQUATION   WITHOUT THE RACE VARIABLE FOR THE IDMS-TRACEABLE   CREATININE METHODS.    https://jasn.asnjournals.org/content/early/2021/09/22/ASN.6976925101      Calcium 10/10/2022 9.8  8.6 - 10.6 mg/dL Final    Albumin 10/10/2022 4.8  3.4 - 5.0 g/dL Final    Alkaline Phosphatase 10/10/2022 38  33 - 110 U/L Final    Total Protein 10/10/2022 7.5  6.4 - 8.2 g/dL Final    AST 10/10/2022 14  9 - 39 U/L Final    Total Bilirubin 10/10/2022 0.9  0.0 - 1.2 mg/dL Final    ALT (SGPT) 10/10/2022 7  7 - 45 U/L Final    Comment:  Patients  treated with Sulfasalazine may generate    falsely decreased results for ALT.     Legacy Encounter on 08/19/2022   Component Date Value Ref Range Status    WBC 08/19/2022 4.3 (L)  4.4 - 11.3 x10E9/L Final    nRBC 08/19/2022 0.0  0.0 - 0.0 /100 WBC Final    RBC 08/19/2022 4.87  4.00 - 5.20 x10E12/L Final    Hemoglobin 08/19/2022 15.2  12.0 - 16.0 g/dL Final    Hematocrit 08/19/2022 45.9  36.0 - 46.0 % Final    MCV 08/19/2022 94  80 - 100 fL Final    MCHC 08/19/2022 33.1  32.0 - 36.0 g/dL Final    Platelets 08/19/2022 233  150 - 450 x10E9/L Final    RDW 08/19/2022 11.9  11.5 - 14.5 % Final    Iron 08/19/2022 102  35 - 150 ug/dL Final    TIBC 08/19/2022 345  240 - 445 ug/dL Final    Iron Saturation 08/19/2022 30  25 - 45 % Final    Vitamin D, 25-Hydroxy 08/19/2022 50  ng/mL Final    Comment: .  DEFICIENCY:         < 20   NG/ML  INSUFFICIENCY:      20-29  NG/ML  SUFFICIENCY:         NG/ML    THIS ASSAY ACCURATELY QUANTIFIES THE SUM OF  VITAMIN D3, 25-HYDROXY AND VIT D2,25-HYDROXY.      Glucose 08/19/2022 97  74 - 99 mg/dL Final    Sodium 08/19/2022 141  136 - 145 mmol/L Final    Potassium 08/19/2022 4.4  3.5 - 5.3 mmol/L Final    Chloride 08/19/2022 103  98 - 107 mmol/L Final    Bicarbonate 08/19/2022 28  21 - 32 mmol/L Final    Anion Gap 08/19/2022 14  10 - 20 mmol/L Final    Urea Nitrogen 08/19/2022 12  6 - 23 mg/dL Final    Creatinine 08/19/2022 0.84  0.50 - 1.05 mg/dL Final    GFR Female 08/19/2022 >90  >90 mL/min/1.73m2 Final    Comment:  CALCULATIONS OF ESTIMATED GFR ARE PERFORMED   USING THE 2021 CKD-EPI STUDY REFIT EQUATION   WITHOUT THE RACE VARIABLE FOR THE IDMS-TRACEABLE   CREATININE METHODS.    https://jasn.asnjournals.org/content/early/2021/09/22/ASN.[email or web address]      Calcium 08/19/2022 10.4  8.6 - 10.6 mg/dL Final    Ferritin 08/19/2022 32  8 - 150 ug/L Final    TSH 08/19/2022 1.24  0.44 - 3.98 mIU/L Final    Comment:  TSH testing is performed using different testing    methodology at [facility] than at other    system hospitals. Direct result comparisons should    only be made within the same method.     Legacy Encounter on 07/06/2022   Component Date Value Ref Range Status    WBC 07/06/2022 8.3  4.4 - 11.3 x10E9/L Final    nRBC 07/06/2022 0.0  0.0 - 0.0 /100 WBC Final    RBC 07/06/2022 4.61  4.00 - 5.20 x10E12/L Final    Hemoglobin 07/06/2022 14.1  12.0 - 16.0 g/dL Final    Hematocrit 07/06/2022 42.0  36.0 - 46.0 % Final    MCV 07/06/2022 91  80 - 100 fL Final    MCHC 07/06/2022 33.6  32.0 - 36.0 g/dL Final    Platelets 07/06/2022 261  150 - 450 x10E9/L Final    RDW 07/06/2022 11.7  11.5 - 14.5 % Final    HSV 1 IgG 07/06/2022 <0.2  INDEX Final    Comment: REF VALUES  NEGATIVE   <0.9  EQUIVOCAL  >=0.90  <=1.10  POSITIVE   >1.10      HSV 2 IgG 07/06/2022 <0.2  INDEX Final    Comment: POTENTIAL FOR CROSS-REACTIVITY BETWEEN  HSV I AND HSV II EXISTS.  REF VALUES  NEGATIVE   <0.9  EQUIVOCAL  >=0.90  <=1.10  POSITIVE   >1.10      TSH 07/06/2022 0.90  0.44 - 3.98 mIU/L Final    Comment:  TSH testing is performed using different testing    methodology at [facility] than at other    system hospitals. Direct result comparisons should    only be made within the same method.      Glucose 07/06/2022 150 (H)  74 - 99 mg/dL Final    Sodium 07/06/2022 138  136 - 145 mmol/L Final    Potassium 07/06/2022 4.2  3.5 - 5.3 mmol/L Final    Chloride 07/06/2022 102  98 - 107 mmol/L Final    Bicarbonate 07/06/2022 27  21 - 32 mmol/L Final    Anion Gap 07/06/2022 13  10 - 20 mmol/L Final    Urea Nitrogen 07/06/2022 8  6 - 23 mg/dL Final    Creatinine 07/06/2022 0.79  0.50 - 1.05 mg/dL Final    GFR Female 07/06/2022 >90  >90 mL/min/1.73m2 Final    Comment:  CALCULATIONS OF ESTIMATED GFR ARE PERFORMED   USING THE 2021 CKD-EPI STUDY REFIT EQUATION   WITHOUT THE RACE VARIABLE FOR THE IDMS-TRACEABLE   CREATININE METHODS.    https://jasn.asnjournals.org/content/early/2021/09/22/ASN.8790342848      Calcium 07/06/2022  9.6  8.6 - 10.6 mg/dL Final    Vitamin D, 25-Hydroxy 07/06/2022 25 (A)  ng/mL Final    Comment: .  DEFICIENCY:         < 20   NG/ML  INSUFFICIENCY:      20-29  NG/ML  SUFFICIENCY:         NG/ML    THIS ASSAY ACCURATELY QUANTIFIES THE SUM OF  VITAMIN D3, 25-HYDROXY AND VIT D2,25-HYDROXY.     Legacy Encounter on 06/24/2022   Component Date Value Ref Range Status    HIV 1 and 2 Screen 06/24/2022 NONREACTIVE  NONREACTIVE Final    Comment:  HIV Ag/Ab screen is performed using the Siem[employer]   HIV Ag/Ab Combo assay which detects the presence of HIV    p24 antigen as well as antibodies to HIV-1   (Group M and O) and HIV-2.  .  No laboratory evidence of HIV infection. If acute HIV infection is   suspected, consider testing for HIV RNA by PCR (viral load).     Legacy Encounter on 06/16/2022   Component Date Value Ref Range Status    Tobacco Screen, Urine 06/16/2022 NEGATIVE   Final    Comment: Cotinine, a metabolite of nicotine, is measured to screen   for nicotine exposure. The cut-off is set at 300ng/mL to   detect active exposure (smoking).    This test was developed and its performance characteristics   were determined by the [facility].       Current Outpatient Medications on File Prior to Visit   Medication Sig Dispense Refill    albuterol 90 mcg/actuation inhaler Inhale 2 puffs every 4 hours if needed.       No current facility-administered medications on file prior to visit.     No images are attached to the encounter.            Assessment/Plan   Diagnoses and all orders for this visit:  Anorexia nervosa  Anxiety  Depression, unspecified depression type  Hypercalcemia  -     PTH, intact; Future  -     Calcium, ionized; Future    Discussion with patient about recent labs performed does show that her protein is elevated her albumin is also elevated at 5.3 and her calcium level is slightly elevated at 10.3.  We will see if we can add ionized calcium and  parathyroid hormone to this lab that she had performed  2.   Patient to continue to follow up with dietician, she is meeting with her   weekly on virtual visit  3.   Patient to call for questions or concerns

## 2023-06-28 ENCOUNTER — OFFICE VISIT (OUTPATIENT)
Dept: PRIMARY CARE | Facility: CLINIC | Age: 23
End: 2023-06-28
Payer: COMMERCIAL

## 2023-06-28 VITALS
WEIGHT: 124.8 LBS | HEART RATE: 61 BPM | SYSTOLIC BLOOD PRESSURE: 110 MMHG | DIASTOLIC BLOOD PRESSURE: 68 MMHG | BODY MASS INDEX: 20.61 KG/M2

## 2023-06-28 DIAGNOSIS — F50.00 ANOREXIA NERVOSA (MULTI): Primary | ICD-10-CM

## 2023-06-28 PROCEDURE — 99214 OFFICE O/P EST MOD 30 MIN: CPT | Performed by: FAMILY MEDICINE

## 2023-06-28 PROCEDURE — 1036F TOBACCO NON-USER: CPT | Performed by: FAMILY MEDICINE

## 2023-06-28 ASSESSMENT — ENCOUNTER SYMPTOMS
COUGH: 0
LIGHT-HEADEDNESS: 0
BACK PAIN: 0
CHOKING: 0
FATIGUE: 0
ARTHRALGIAS: 0
COLOR CHANGE: 0
APPETITE CHANGE: 0
CHEST TIGHTNESS: 0
DIZZINESS: 0
ACTIVITY CHANGE: 0
FEVER: 0
PALPITATIONS: 0
HEADACHES: 0
FACIAL ASYMMETRY: 0

## 2023-06-28 NOTE — PROGRESS NOTES
Subjective   Patient ID: Whit Chacko is a 23 y.o. female who presents for Follow-up.    HPI   Patient comes in that she is here to follow-up from recent visit to dietitian.  She has not had any episodes of lightheadedness dizziness and almost passing out.  Patient is a known anorexic.    She feels that her hair is thinning since May.     Review of Systems   Constitutional:  Negative for activity change, appetite change, fatigue and fever.   HENT:  Negative for congestion.    Respiratory:  Negative for cough, choking and chest tightness.    Cardiovascular:  Negative for chest pain, palpitations and leg swelling.   Musculoskeletal:  Negative for arthralgias, back pain and gait problem.   Skin:  Negative for color change and pallor.   Neurological:  Negative for dizziness, facial asymmetry, light-headedness and headaches.       Objective   /68 (BP Location: Right arm)   Pulse 61   Wt 56.6 kg (124 lb 12.8 oz)   BMI 20.61 kg/m²   BSA Body surface area is 1.61 meters squared.      Physical Exam  Constitutional:       General: She is not in acute distress.     Appearance: Normal appearance. She is not toxic-appearing.   HENT:      Head: Normocephalic.      Right Ear: Tympanic membrane, ear canal and external ear normal.      Left Ear: Tympanic membrane, ear canal and external ear normal.   Eyes:      Conjunctiva/sclera: Conjunctivae normal.      Pupils: Pupils are equal, round, and reactive to light.   Cardiovascular:      Rate and Rhythm: Normal rate and regular rhythm.      Pulses: Normal pulses.      Heart sounds: Normal heart sounds.   Pulmonary:      Effort: No respiratory distress.      Breath sounds: No wheezing, rhonchi or rales.   Musculoskeletal:         General: No swelling or tenderness.      Cervical back: No tenderness.   Skin:     Findings: No lesion or rash.   Neurological:      General: No focal deficit present.      Mental Status: She is alert and oriented to person, place, and time. Mental  status is at baseline.      Gait: Gait normal.   Psychiatric:         Mood and Affect: Mood normal.         Behavior: Behavior normal.         Thought Content: Thought content normal.         Judgment: Judgment normal.       Legacy Encounter on 02/13/2023   Component Date Value Ref Range Status    Color, Urine 02/13/2023 YELLOW  STRAW,YELLOW Final    Appearance, Urine 02/13/2023 CLEAR  CLEAR Final    Specific Gravity, Urine 02/13/2023 1.020  1.005 - 1.035 Final    pH, Urine 02/13/2023 5.0  5.0 - 8.0 Final    Protein, Urine 02/13/2023 NEGATIVE  NEGATIVE mg/dL Final    Glucose, Urine 02/13/2023 NEGATIVE  NEGATIVE mg/dL Final    Blood, Urine 02/13/2023 NEGATIVE  NEGATIVE Final    Ketones, Urine 02/13/2023 NEGATIVE  NEGATIVE mg/dL Final    Bilirubin, Urine 02/13/2023 NEGATIVE  NEGATIVE Final    Urobilinogen, Urine 02/13/2023 <2.0  0.0 - 1.9 mg/dL Final    Nitrite, Urine 02/13/2023 NEGATIVE  NEGATIVE Final    Leukocyte Esterase, Urine 02/13/2023 NEGATIVE  NEGATIVE Final    Glucose 02/13/2023 95  74 - 99 mg/dL Final    Sodium 02/13/2023 139  136 - 145 mmol/L Final    Potassium 02/13/2023 4.6  3.5 - 5.3 mmol/L Final    Chloride 02/13/2023 103  98 - 107 mmol/L Final    Bicarbonate 02/13/2023 28  21 - 32 mmol/L Final    Anion Gap 02/13/2023 13  10 - 20 mmol/L Final    Urea Nitrogen 02/13/2023 12  6 - 23 mg/dL Final    Creatinine 02/13/2023 0.85  0.50 - 1.05 mg/dL Final    GFR Female 02/13/2023 >90  >90 mL/min/1.73m2 Final    Comment:  CALCULATIONS OF ESTIMATED GFR ARE PERFORMED   USING THE 2021 CKD-EPI STUDY REFIT EQUATION   WITHOUT THE RACE VARIABLE FOR THE IDMS-TRACEABLE   CREATININE METHODS.    https://jasn.asnjournals.org/content/early/2021/09/22/ASN.[email or web address]      Calcium 02/13/2023 10.1  8.6 - 10.6 mg/dL Final    Albumin 02/13/2023 4.9  3.4 - 5.0 g/dL Final    Alkaline Phosphatase 02/13/2023 38  33 - 110 U/L Final    Total Protein 02/13/2023 7.4  6.4 - 8.2 g/dL Final    AST 02/13/2023 54 (H)  9 - 39 U/L Final    Total  Bilirubin 02/13/2023 0.7  0.0 - 1.2 mg/dL Final    ALT (SGPT) 02/13/2023 17  7 - 45 U/L Final    Comment:  Patients treated with Sulfasalazine may generate    falsely decreased results for ALT.      WBC 02/13/2023 4.5  4.4 - 11.3 x10E9/L Final    nRBC 02/13/2023 0.0  0.0 - 0.0 /100 WBC Final    RBC 02/13/2023 4.81  4.00 - 5.20 x10E12/L Final    Hemoglobin 02/13/2023 14.4  12.0 - 16.0 g/dL Final    Hematocrit 02/13/2023 44.2  36.0 - 46.0 % Final    MCV 02/13/2023 92  80 - 100 fL Final    MCHC 02/13/2023 32.6  32.0 - 36.0 g/dL Final    Platelets 02/13/2023 271  150 - 450 x10E9/L Final    RDW 02/13/2023 12.1  11.5 - 14.5 % Final    Neutrophils % 02/13/2023 56.9  40.0 - 80.0 % Final    Immature Granulocytes %, Automated 02/13/2023 0.2  0.0 - 0.9 % Final    Comment:  Immature Granulocyte Count (IG) includes promyelocytes,    myelocytes and metamyelocytes but does not include bands.   Percent differential counts (%) should be interpreted in the   context of the absolute cell counts (cells/L).      Lymphocytes % 02/13/2023 32.8  13.0 - 44.0 % Final    Monocytes % 02/13/2023 6.5  2.0 - 10.0 % Final    Eosinophils % 02/13/2023 2.5  0.0 - 6.0 % Final    Basophils % 02/13/2023 1.1  0.0 - 2.0 % Final    Neutrophils Absolute 02/13/2023 2.55  1.20 - 7.70 x10E9/L Final    Lymphocytes Absolute 02/13/2023 1.47  1.20 - 4.80 x10E9/L Final    Monocytes Absolute 02/13/2023 0.29  0.10 - 1.00 x10E9/L Final    Eosinophils Absolute 02/13/2023 0.11  0.00 - 0.70 x10E9/L Final    Basophils Absolute 02/13/2023 0.05  0.00 - 0.10 x10E9/L Final    Phosphorus 02/13/2023 3.8  2.5 - 4.9 mg/dL Final    Comment:  The performance characteristics of phosphorus testing in   heparinized plasma have been validated by the individual     laboratory site where testing is performed. Testing    on heparinized plasma is not approved by the FDA;    however, such approval is not necessary.      Magnesium 02/13/2023 2.18  1.60 - 2.40 mg/dL Final   Legacy Encounter  on 12/27/2022   Component Date Value Ref Range Status    Hepatitis C Ab 12/27/2022 NONREACTIVE  NONREACTIVE Final    Comment:  Results from patients taking biotin supplements or receiving   high-dose biotin therapy should be interpreted with caution   due to possible interference with this test. Providers may    contact their local laboratory for further information.     Legacy Encounter on 12/08/2022   Component Date Value Ref Range Status    DRUG SCREEN COMMENT URINE 12/08/2022 SEE BELOW   Final    Comment: Drug screen results are presumptive and should not be used to assess   compliance with prescribed medication. Definitive confirmatory drug testing   has been added to this sample for any positive screen result and will be   reported separately.   .  Toxicology screening results are reported qualitatively. The concentration   must be greater than or equal to the cutoff to be reported as positive. The   concentration at which the screening test can detect an individual drug or   metabolite varies. The absence of expected drug(s) and/or drug metabolite(s)   may indicate non-compliance, inappropriate timing of specimen collection   relative to drug administration, poor drug absorption, diluted/adulterated   urine, or limitations of testing. For medical purposes only; not valid for   forensic use.   .  Interpretive questions should be directed to the laboratory medical   directors.        Amphetamine Screen, Urine 12/08/2022 PRESUMPTIVE NEGATIVE  NEGATIVE Final    Comment:  CUTOFF LEVEL:  500 NG/ML   Cross-reactivity has been reported with high concentrations   of the following drugs: buproprion, chloroquine, chlorpromazine,   ephedrine, mephentermine, fenfluramine, phentermine,   phenylpropanolamine, pseudoephedrine, and propranolol.      Barbiturate Screen, Urine 12/08/2022 PRESUMPTIVE NEGATIVE  NEGATIVE Final     CUTOFF LEVEL: 200 NG/ML    BENZODIAZEPINE (PRESENCE) IN URINE* 12/08/2022 PRESUMPTIVE NEGATIVE   NEGATIVE Final     CUTOFF LEVEL: 200 NG/ML    Cannabinoid Screen, Urine 12/08/2022 PRESUMPTIVE NEGATIVE  NEGATIVE Final     CUTOFF LEVEL: 50 NG/ML    Cocaine Screen, Urine 12/08/2022 PRESUMPTIVE NEGATIVE  NEGATIVE Final     CUTOFF LEVEL: 150 NG/ML    Fentanyl, Ur 12/08/2022 PRESUMPTIVE NEGATIVE  NEGATIVE Final      CUTOFF LEVEL:  5 NG/ML    Methadone Screen, Urine 12/08/2022 PRESUMPTIVE NEGATIVE  NEGATIVE Final    Comment:  CUTOFF LEVEL: 150 NG/ML   The metabolite L-alpha-acetylmethadol (LAAM) is not   detected by this method in concentrations that would   be found in the urine of patients on LAAM therapy.      Opiate Screen, Urine 12/08/2022 PRESUMPTIVE NEGATIVE  NEGATIVE Final    Comment:  CUTOFF LEVEL: 300 NG/ML   The opiate screen does not detect fentanyl, meperidine, or    tramadol. Oxycodone is not consistently detected (refer to   Oxycodone Screen, Urine result).      Oxycodone Screen, Ur 12/08/2022 PRESUMPTIVE NEGATIVE  NEGATIVE Final    Comment:  CUTOFF LEVEL: 100 NG/ML    This test will accurately detect both oxycodone and oxymorphone.           PCP Screen, Urine 12/08/2022 PRESUMPTIVE NEGATIVE  NEGATIVE Final    Comment:  CUTOFF LEVEL:  25 NG/ML   Cross-reactivity has been reported with dextromethorphan.     Legacy Encounter on 11/25/2022   Component Date Value Ref Range Status    SARS-COV-2 RESULT 11/25/2022 NOT DETECTED  Not Detected Final    Comment: .  This test has received FDA Emergency Use Authorization (EUA) and has been   verified by [facility]. This test is only   authorized for the duration of time that circumstances exist to justify the   authorization of the emergency use of in vitro diagnostic tests for the   detection of SARS-CoV-2 virus and/or diagnosis of COVID-19 infection under   section 564(b)(1) of the Act, 21 U.S.C. 360bbb-3(b)(1), unless the   authorization is terminated or revoked sooner.     [facility] is certified  under CLIA-88 as   qualified to perform high complexity testing. Testing is performed in the   [facility] laboratory located at [address] [place] [address].    SARS-CoV-2/Flu/RSV Multiplex Test:   Fact sheet for providers: https://www.fda.gov/media/830191/download  Fact sheet for patients: https://www.fda.gov/media/497305/download     Legacy Encounter on 10/10/2022   Component Date Value Ref Range Status    Cholesterol 10/10/2022 148  0 - 199 mg/dL Final    Comment: .      AGE      DESIRABLE   BORDERLINE HIGH   HIGH     0-19 Y     0 - 169       170 - 199     >/= 200    20-24 Y     0 - 189       190 - 224     >/= 225         >24 Y     0 - 199       200 - 239     >/= 240   **All ranges are based on fasting samples. Specific   therapeutic targets will vary based on patient-specific   cardiac risk.  .   Pediatric guidelines reference:Pediatrics 2011, 128(S5).   Adult guidelines reference: NCEP ATPIII Guidelines,     ALMA ROSA 2001, 258:2486-97  .   Venipuncture immediately after or during the    administration of Metamizole may lead to falsely   low results. Testing should be performed immediately   prior to Metamizole dosing.      HDL 10/10/2022 61.8  mg/dL Final    Comment: .      AGE      VERY LOW   LOW     NORMAL    HIGH       0-19 Y       < 35   < 40     40-45     ----    20-24 Y       ----   < 40       >45     ----      >24 Y       ----   < 40     40-60      >60  .      Cholesterol/HDL Ratio 10/10/2022 2.4   Final    Comment: REF VALUES  DESIRABLE  < 3.4  HIGH RISK  > 5.0      LDL 10/10/2022 78  0 - 119 mg/dL Final    Comment: .                           NEAR      BORD      AGE      DESIRABLE  OPTIMAL    HIGH     HIGH     VERY HIGH     0-19 Y     0 - 109     ---    110-129   >/= 130     ----    20-24 Y     0 - 119     ---    120-159   >/= 160     ----      >24 Y     0 -  99   100-129  130-159   160-189     >/=190  .      VLDL 10/10/2022 8  0 - 40 mg/dL Final    Triglycerides 10/10/2022 42  0 - 149  mg/dL Final    Comment: .      AGE      DESIRABLE   BORDERLINE HIGH   HIGH     VERY HIGH   0 D-90 D    19 - 174         ----         ----        ----  91 D- 9 Y     0 -  74        75 -  99     >/= 100      ----    10-19 Y     0 -  89        90 - 129     >/= 130      ----    20-24 Y     0 - 114       115 - 149     >/= 150      ----         >24 Y     0 - 149       150 - 199    200- 499    >/= 500  .   Venipuncture immediately after or during the    administration of Metamizole may lead to falsely   low results. Testing should be performed immediately   prior to Metamizole dosing.      Non HDL Cholesterol 10/10/2022 86  0 - 149 mg/dL Final    Comment:     AGE      DESIRABLE   BORDERLINE HIGH   HIGH     VERY HIGH     0-19 Y     0 - 119       120 - 144     >/= 145    >/= 160    20-24 Y     0 - 149       150 - 189     >/= 190      ----         >24 Y    30 MG/DL ABOVE LDL CHOLESTEROL GOAL  .      Glucose 10/10/2022 86  74 - 99 mg/dL Final    Sodium 10/10/2022 140  136 - 145 mmol/L Final    Potassium 10/10/2022 4.0  3.5 - 5.3 mmol/L Final    Chloride 10/10/2022 103  98 - 107 mmol/L Final    Bicarbonate 10/10/2022 28  21 - 32 mmol/L Final    Anion Gap 10/10/2022 13  10 - 20 mmol/L Final    Urea Nitrogen 10/10/2022 8  6 - 23 mg/dL Final    Creatinine 10/10/2022 0.71  0.50 - 1.05 mg/dL Final    GFR Female 10/10/2022 >90  >90 mL/min/1.73m2 Final    Comment:  CALCULATIONS OF ESTIMATED GFR ARE PERFORMED   USING THE 2021 CKD-EPI STUDY REFIT EQUATION   WITHOUT THE RACE VARIABLE FOR THE IDMS-TRACEABLE   CREATININE METHODS.    https://jasn.asnjournals.org/content/early/2021/09/22/ASN.6791235187      Calcium 10/10/2022 9.8  8.6 - 10.6 mg/dL Final    Albumin 10/10/2022 4.8  3.4 - 5.0 g/dL Final    Alkaline Phosphatase 10/10/2022 38  33 - 110 U/L Final    Total Protein 10/10/2022 7.5  6.4 - 8.2 g/dL Final    AST 10/10/2022 14  9 - 39 U/L Final    Total Bilirubin 10/10/2022 0.9  0.0 - 1.2 mg/dL Final    ALT (SGPT) 10/10/2022 7  7 - 45  U/L Final    Comment:  Patients treated with Sulfasalazine may generate    falsely decreased results for ALT.     Legacy Encounter on 08/19/2022   Component Date Value Ref Range Status    WBC 08/19/2022 4.3 (L)  4.4 - 11.3 x10E9/L Final    nRBC 08/19/2022 0.0  0.0 - 0.0 /100 WBC Final    RBC 08/19/2022 4.87  4.00 - 5.20 x10E12/L Final    Hemoglobin 08/19/2022 15.2  12.0 - 16.0 g/dL Final    Hematocrit 08/19/2022 45.9  36.0 - 46.0 % Final    MCV 08/19/2022 94  80 - 100 fL Final    MCHC 08/19/2022 33.1  32.0 - 36.0 g/dL Final    Platelets 08/19/2022 233  150 - 450 x10E9/L Final    RDW 08/19/2022 11.9  11.5 - 14.5 % Final    Iron 08/19/2022 102  35 - 150 ug/dL Final    TIBC 08/19/2022 345  240 - 445 ug/dL Final    Iron Saturation 08/19/2022 30  25 - 45 % Final    Vitamin D, 25-Hydroxy 08/19/2022 50  ng/mL Final    Comment: .  DEFICIENCY:         < 20   NG/ML  INSUFFICIENCY:      20-29  NG/ML  SUFFICIENCY:         NG/ML    THIS ASSAY ACCURATELY QUANTIFIES THE SUM OF  VITAMIN D3, 25-HYDROXY AND VIT D2,25-HYDROXY.      Glucose 08/19/2022 97  74 - 99 mg/dL Final    Sodium 08/19/2022 141  136 - 145 mmol/L Final    Potassium 08/19/2022 4.4  3.5 - 5.3 mmol/L Final    Chloride 08/19/2022 103  98 - 107 mmol/L Final    Bicarbonate 08/19/2022 28  21 - 32 mmol/L Final    Anion Gap 08/19/2022 14  10 - 20 mmol/L Final    Urea Nitrogen 08/19/2022 12  6 - 23 mg/dL Final    Creatinine 08/19/2022 0.84  0.50 - 1.05 mg/dL Final    GFR Female 08/19/2022 >90  >90 mL/min/1.73m2 Final    Comment:  CALCULATIONS OF ESTIMATED GFR ARE PERFORMED   USING THE 2021 CKD-EPI STUDY REFIT EQUATION   WITHOUT THE RACE VARIABLE FOR THE IDMS-TRACEABLE   CREATININE METHODS.    https://jasn.asnjournals.org/content/early/2021/09/22/ASN.[email or web address]      Calcium 08/19/2022 10.4  8.6 - 10.6 mg/dL Final    Ferritin 08/19/2022 32  8 - 150 ug/L Final    TSH 08/19/2022 1.24  0.44 - 3.98 mIU/L Final    Comment:  TSH testing is performed using different testing     methodology at [facility] than at other    system [facility]. Direct result comparisons should    only be made within the same method.     Legacy Encounter on 07/06/2022   Component Date Value Ref Range Status    WBC 07/06/2022 8.3  4.4 - 11.3 x10E9/L Final    nRBC 07/06/2022 0.0  0.0 - 0.0 /100 WBC Final    RBC 07/06/2022 4.61  4.00 - 5.20 x10E12/L Final    Hemoglobin 07/06/2022 14.1  12.0 - 16.0 g/dL Final    Hematocrit 07/06/2022 42.0  36.0 - 46.0 % Final    MCV 07/06/2022 91  80 - 100 fL Final    MCHC 07/06/2022 33.6  32.0 - 36.0 g/dL Final    Platelets 07/06/2022 261  150 - 450 x10E9/L Final    RDW 07/06/2022 11.7  11.5 - 14.5 % Final    HSV 1 IgG 07/06/2022 <0.2  INDEX Final    Comment: REF VALUES  NEGATIVE   <0.9  EQUIVOCAL  >=0.90  <=1.10  POSITIVE   >1.10      HSV 2 IgG 07/06/2022 <0.2  INDEX Final    Comment: POTENTIAL FOR CROSS-REACTIVITY BETWEEN  HSV I AND HSV II EXISTS.  REF VALUES  NEGATIVE   <0.9  EQUIVOCAL  >=0.90  <=1.10  POSITIVE   >1.10      TSH 07/06/2022 0.90  0.44 - 3.98 mIU/L Final    Comment:  TSH testing is performed using different testing    methodology at [facility] than at other    system [facility]. Direct result comparisons should    only be made within the same method.      Glucose 07/06/2022 150 (H)  74 - 99 mg/dL Final    Sodium 07/06/2022 138  136 - 145 mmol/L Final    Potassium 07/06/2022 4.2  3.5 - 5.3 mmol/L Final    Chloride 07/06/2022 102  98 - 107 mmol/L Final    Bicarbonate 07/06/2022 27  21 - 32 mmol/L Final    Anion Gap 07/06/2022 13  10 - 20 mmol/L Final    Urea Nitrogen 07/06/2022 8  6 - 23 mg/dL Final    Creatinine 07/06/2022 0.79  0.50 - 1.05 mg/dL Final    GFR Female 07/06/2022 >90  >90 mL/min/1.73m2 Final    Comment:  CALCULATIONS OF ESTIMATED GFR ARE PERFORMED   USING THE 2021 CKD-EPI STUDY REFIT EQUATION   WITHOUT THE RACE VARIABLE FOR THE IDMS-TRACEABLE   CREATININE  METHODS.    https://jasn.asnjournals.org/content/early/2021/09/22/ASN.4351278816      Calcium 07/06/2022 9.6  8.6 - 10.6 mg/dL Final    Vitamin D, 25-Hydroxy 07/06/2022 25 (A)  ng/mL Final    Comment: .  DEFICIENCY:         < 20   NG/ML  INSUFFICIENCY:      20-29  NG/ML  SUFFICIENCY:         NG/ML    THIS ASSAY ACCURATELY QUANTIFIES THE SUM OF  VITAMIN D3, 25-HYDROXY AND VIT D2,25-HYDROXY.       Current Outpatient Medications on File Prior to Visit   Medication Sig Dispense Refill    albuterol 90 mcg/actuation inhaler Inhale 2 puffs every 4 hours if needed.       No current facility-administered medications on file prior to visit.     No images are attached to the encounter.            Assessment/Plan   Diagnoses and all orders for this visit:  Anorexia nervosa    Discussion with patient about continuing with the dietician weekly  2.   Patient to call for questions or concerns

## 2023-07-28 ENCOUNTER — APPOINTMENT (OUTPATIENT)
Dept: LAB | Facility: LAB | Age: 23
End: 2023-07-28

## 2023-08-02 ENCOUNTER — OFFICE VISIT (OUTPATIENT)
Dept: PRIMARY CARE | Facility: CLINIC | Age: 23
End: 2023-08-02

## 2023-08-02 VITALS
DIASTOLIC BLOOD PRESSURE: 72 MMHG | BODY MASS INDEX: 20.01 KG/M2 | SYSTOLIC BLOOD PRESSURE: 120 MMHG | HEART RATE: 68 BPM | WEIGHT: 121.2 LBS

## 2023-08-02 DIAGNOSIS — F50.00 ANOREXIA NERVOSA (MULTI): Primary | ICD-10-CM

## 2023-08-02 DIAGNOSIS — F41.9 ANXIETY: ICD-10-CM

## 2023-08-02 PROCEDURE — 1036F TOBACCO NON-USER: CPT | Performed by: FAMILY MEDICINE

## 2023-08-02 PROCEDURE — 99214 OFFICE O/P EST MOD 30 MIN: CPT | Performed by: FAMILY MEDICINE

## 2023-08-02 ASSESSMENT — ENCOUNTER SYMPTOMS
CHOKING: 0
FATIGUE: 0
ARTHRALGIAS: 0
BACK PAIN: 0
ACTIVITY CHANGE: 0
PALPITATIONS: 0
DIZZINESS: 0
CHEST TIGHTNESS: 0
COUGH: 0
APPETITE CHANGE: 0
FEVER: 0
LIGHT-HEADEDNESS: 0
COLOR CHANGE: 0
FACIAL ASYMMETRY: 0
HEADACHES: 0

## 2023-08-02 NOTE — PROGRESS NOTES
"Subjective   Patient ID: Whit Chacko is a 23 y.o. female who presents for Follow-up.    HPI   Patient comes in that she is here to follow-up from recent visit to dietitian.  She has not had any episodes of lightheadedness dizziness and almost passing out.  Patient is a known anorexic.    She feels that her hair is thinning since May.   Stress this month has been a \"hot mess.\" Her dad is not doing well.     Review of Systems   Constitutional:  Negative for activity change, appetite change, fatigue and fever.   HENT:  Negative for congestion.    Respiratory:  Negative for cough, choking and chest tightness.    Cardiovascular:  Negative for chest pain, palpitations and leg swelling.   Musculoskeletal:  Negative for arthralgias, back pain and gait problem.   Skin:  Negative for color change and pallor.   Neurological:  Negative for dizziness, facial asymmetry, light-headedness and headaches.       Objective   /72   Pulse 68   Wt 55 kg (121 lb 3.2 oz)   BMI 20.01 kg/m²   BSA Body surface area is 1.59 meters squared.      Physical Exam  Constitutional:       General: She is not in acute distress.     Appearance: Normal appearance. She is not toxic-appearing.   HENT:      Head: Normocephalic.      Right Ear: Tympanic membrane, ear canal and external ear normal.      Left Ear: Tympanic membrane, ear canal and external ear normal.   Eyes:      Conjunctiva/sclera: Conjunctivae normal.      Pupils: Pupils are equal, round, and reactive to light.   Cardiovascular:      Rate and Rhythm: Normal rate and regular rhythm.      Pulses: Normal pulses.      Heart sounds: Normal heart sounds.   Pulmonary:      Effort: No respiratory distress.      Breath sounds: No wheezing, rhonchi or rales.   Musculoskeletal:         General: No swelling or tenderness.      Cervical back: No tenderness.   Skin:     Findings: No lesion or rash.   Neurological:      General: No focal deficit present.      Mental Status: She is alert and " oriented to person, place, and time. Mental status is at baseline.      Gait: Gait normal.   Psychiatric:         Mood and Affect: Mood normal.         Behavior: Behavior normal.         Thought Content: Thought content normal.         Judgment: Judgment normal.       Legacy Encounter on 02/13/2023   Component Date Value Ref Range Status    Color, Urine 02/13/2023 YELLOW  STRAW,YELLOW Final    Appearance, Urine 02/13/2023 CLEAR  CLEAR Final    Specific Gravity, Urine 02/13/2023 1.020  1.005 - 1.035 Final    pH, Urine 02/13/2023 5.0  5.0 - 8.0 Final    Protein, Urine 02/13/2023 NEGATIVE  NEGATIVE mg/dL Final    Glucose, Urine 02/13/2023 NEGATIVE  NEGATIVE mg/dL Final    Blood, Urine 02/13/2023 NEGATIVE  NEGATIVE Final    Ketones, Urine 02/13/2023 NEGATIVE  NEGATIVE mg/dL Final    Bilirubin, Urine 02/13/2023 NEGATIVE  NEGATIVE Final    Urobilinogen, Urine 02/13/2023 <2.0  0.0 - 1.9 mg/dL Final    Nitrite, Urine 02/13/2023 NEGATIVE  NEGATIVE Final    Leukocyte Esterase, Urine 02/13/2023 NEGATIVE  NEGATIVE Final    Glucose 02/13/2023 95  74 - 99 mg/dL Final    Sodium 02/13/2023 139  136 - 145 mmol/L Final    Potassium 02/13/2023 4.6  3.5 - 5.3 mmol/L Final    Chloride 02/13/2023 103  98 - 107 mmol/L Final    Bicarbonate 02/13/2023 28  21 - 32 mmol/L Final    Anion Gap 02/13/2023 13  10 - 20 mmol/L Final    Urea Nitrogen 02/13/2023 12  6 - 23 mg/dL Final    Creatinine 02/13/2023 0.85  0.50 - 1.05 mg/dL Final    GFR Female 02/13/2023 >90  >90 mL/min/1.73m2 Final    Comment:  CALCULATIONS OF ESTIMATED GFR ARE PERFORMED   USING THE 2021 CKD-EPI STUDY REFIT EQUATION   WITHOUT THE RACE VARIABLE FOR THE IDMS-TRACEABLE   CREATININE METHODS.    https://jasn.asnjournals.org/content/early/2021/09/22/ASN.0352392677      Calcium 02/13/2023 10.1  8.6 - 10.6 mg/dL Final    Albumin 02/13/2023 4.9  3.4 - 5.0 g/dL Final    Alkaline Phosphatase 02/13/2023 38  33 - 110 U/L Final    Total Protein 02/13/2023 7.4  6.4 - 8.2 g/dL Final    AST  02/13/2023 54 (H)  9 - 39 U/L Final    Total Bilirubin 02/13/2023 0.7  0.0 - 1.2 mg/dL Final    ALT (SGPT) 02/13/2023 17  7 - 45 U/L Final    Comment:  Patients treated with Sulfasalazine may generate    falsely decreased results for ALT.      WBC 02/13/2023 4.5  4.4 - 11.3 x10E9/L Final    nRBC 02/13/2023 0.0  0.0 - 0.0 /100 WBC Final    RBC 02/13/2023 4.81  4.00 - 5.20 x10E12/L Final    Hemoglobin 02/13/2023 14.4  12.0 - 16.0 g/dL Final    Hematocrit 02/13/2023 44.2  36.0 - 46.0 % Final    MCV 02/13/2023 92  80 - 100 fL Final    MCHC 02/13/2023 32.6  32.0 - 36.0 g/dL Final    Platelets 02/13/2023 271  150 - 450 x10E9/L Final    RDW 02/13/2023 12.1  11.5 - 14.5 % Final    Neutrophils % 02/13/2023 56.9  40.0 - 80.0 % Final    Immature Granulocytes %, Automated 02/13/2023 0.2  0.0 - 0.9 % Final    Comment:  Immature Granulocyte Count (IG) includes promyelocytes,    myelocytes and metamyelocytes but does not include bands.   Percent differential counts (%) should be interpreted in the   context of the absolute cell counts (cells/L).      Lymphocytes % 02/13/2023 32.8  13.0 - 44.0 % Final    Monocytes % 02/13/2023 6.5  2.0 - 10.0 % Final    Eosinophils % 02/13/2023 2.5  0.0 - 6.0 % Final    Basophils % 02/13/2023 1.1  0.0 - 2.0 % Final    Neutrophils Absolute 02/13/2023 2.55  1.20 - 7.70 x10E9/L Final    Lymphocytes Absolute 02/13/2023 1.47  1.20 - 4.80 x10E9/L Final    Monocytes Absolute 02/13/2023 0.29  0.10 - 1.00 x10E9/L Final    Eosinophils Absolute 02/13/2023 0.11  0.00 - 0.70 x10E9/L Final    Basophils Absolute 02/13/2023 0.05  0.00 - 0.10 x10E9/L Final    Phosphorus 02/13/2023 3.8  2.5 - 4.9 mg/dL Final    Comment:  The performance characteristics of phosphorus testing in   heparinized plasma have been validated by the individual     laboratory site where testing is performed. Testing    on heparinized plasma is not approved by the FDA;    however, such approval is not necessary.      Magnesium 02/13/2023  2.18  1.60 - 2.40 mg/dL Final   Legacy Encounter on 12/27/2022   Component Date Value Ref Range Status    Hepatitis C Ab 12/27/2022 NONREACTIVE  NONREACTIVE Final    Comment:  Results from patients taking biotin supplements or receiving   high-dose biotin therapy should be interpreted with caution   due to possible interference with this test. Providers may    contact their local laboratory for further information.     Legacy Encounter on 12/08/2022   Component Date Value Ref Range Status    DRUG SCREEN COMMENT URINE 12/08/2022 SEE BELOW   Final    Comment: Drug screen results are presumptive and should not be used to assess   compliance with prescribed medication. Definitive confirmatory drug testing   has been added to this sample for any positive screen result and will be   reported separately.   .  Toxicology screening results are reported qualitatively. The concentration   must be greater than or equal to the cutoff to be reported as positive. The   concentration at which the screening test can detect an individual drug or   metabolite varies. The absence of expected drug(s) and/or drug metabolite(s)   may indicate non-compliance, inappropriate timing of specimen collection   relative to drug administration, poor drug absorption, diluted/adulterated   urine, or limitations of testing. For medical purposes only; not valid for   forensic use.   .  Interpretive questions should be directed to the laboratory medical   directors.        Amphetamine Screen, Urine 12/08/2022 PRESUMPTIVE NEGATIVE  NEGATIVE Final    Comment:  CUTOFF LEVEL:  500 NG/ML   Cross-reactivity has been reported with high concentrations   of the following drugs: buproprion, chloroquine, chlorpromazine,   ephedrine, mephentermine, fenfluramine, phentermine,   phenylpropanolamine, pseudoephedrine, and propranolol.      Barbiturate Screen, Urine 12/08/2022 PRESUMPTIVE NEGATIVE  NEGATIVE Final     CUTOFF LEVEL: 200 NG/ML    BENZODIAZEPINE (PRESENCE)  IN URINE* 12/08/2022 PRESUMPTIVE NEGATIVE  NEGATIVE Final     CUTOFF LEVEL: 200 NG/ML    Cannabinoid Screen, Urine 12/08/2022 PRESUMPTIVE NEGATIVE  NEGATIVE Final     CUTOFF LEVEL: 50 NG/ML    Cocaine Screen, Urine 12/08/2022 PRESUMPTIVE NEGATIVE  NEGATIVE Final     CUTOFF LEVEL: 150 NG/ML    Fentanyl, Ur 12/08/2022 PRESUMPTIVE NEGATIVE  NEGATIVE Final      CUTOFF LEVEL:  5 NG/ML    Methadone Screen, Urine 12/08/2022 PRESUMPTIVE NEGATIVE  NEGATIVE Final    Comment:  CUTOFF LEVEL: 150 NG/ML   The metabolite L-alpha-acetylmethadol (LAAM) is not   detected by this method in concentrations that would   be found in the urine of patients on LAAM therapy.      Opiate Screen, Urine 12/08/2022 PRESUMPTIVE NEGATIVE  NEGATIVE Final    Comment:  CUTOFF LEVEL: 300 NG/ML   The opiate screen does not detect fentanyl, meperidine, or    tramadol. Oxycodone is not consistently detected (refer to   Oxycodone Screen, Urine result).      Oxycodone Screen, Ur 12/08/2022 PRESUMPTIVE NEGATIVE  NEGATIVE Final    Comment:  CUTOFF LEVEL: 100 NG/ML    This test will accurately detect both oxycodone and oxymorphone.           PCP Screen, Urine 12/08/2022 PRESUMPTIVE NEGATIVE  NEGATIVE Final    Comment:  CUTOFF LEVEL:  25 NG/ML   Cross-reactivity has been reported with dextromethorphan.     Legacy Encounter on 11/25/2022   Component Date Value Ref Range Status    SARS-CoV-2 Result 11/25/2022 NOT DETECTED  Not Detected Final    Comment: .  This test has received FDA Emergency Use Authorization (EUA) and has been   verified by [facility]. This test is only   authorized for the duration of time that circumstances exist to justify the   authorization of the emergency use of in vitro diagnostic tests for the   detection of SARS-CoV-2 virus and/or diagnosis of COVID-19 infection under   section 564(b)(1) of the Act, 21 U.S.C. 360bbb-3(b)(1), unless the   authorization is terminated or revoked sooner.     [facility] is certified under CLIA-88 as   qualified to perform high complexity testing. Testing is performed in the   [facility] laboratory located at [address] [place] [address].    SARS-CoV-2/Flu/RSV Multiplex Test:   Fact sheet for providers: https://www.fda.gov/media/504069/download  Fact sheet for patients: https://www.fda.gov/media/936889/download     Legacy Encounter on 10/10/2022   Component Date Value Ref Range Status    Cholesterol 10/10/2022 148  0 - 199 mg/dL Final    Comment: .      AGE      DESIRABLE   BORDERLINE HIGH   HIGH     0-19 Y     0 - 169       170 - 199     >/= 200    20-24 Y     0 - 189       190 - 224     >/= 225         >24 Y     0 - 199       200 - 239     >/= 240   **All ranges are based on fasting samples. Specific   therapeutic targets will vary based on patient-specific   cardiac risk.  .   Pediatric guidelines reference:Pediatrics 2011, 128(S5).   Adult guidelines reference: NCEP ATPIII Guidelines,     ALMA ROSA 2001, 258:2486-97  .   Venipuncture immediately after or during the    administration of Metamizole may lead to falsely   low results. Testing should be performed immediately   prior to Metamizole dosing.      HDL 10/10/2022 61.8  mg/dL Final    Comment: .      AGE      VERY LOW   LOW     NORMAL    HIGH       0-19 Y       < 35   < 40     40-45     ----    20-24 Y       ----   < 40       >45     ----      >24 Y       ----   < 40     40-60      >60  .      Cholesterol/HDL Ratio 10/10/2022 2.4   Final    Comment: REF VALUES  DESIRABLE  < 3.4  HIGH RISK  > 5.0      LDL 10/10/2022 78  0 - 119 mg/dL Final    Comment: .                           NEAR      BORD      AGE      DESIRABLE  OPTIMAL    HIGH     HIGH     VERY HIGH     0-19 Y     0 - 109     ---    110-129   >/= 130     ----    20-24 Y     0 - 119     ---    120-159   >/= 160     ----      >24 Y     0 -  99   100-129  130-159   160-189     >/=190  .      VLDL 10/10/2022 8  0 - 40 mg/dL  Final    Triglycerides 10/10/2022 42  0 - 149 mg/dL Final    Comment: .      AGE      DESIRABLE   BORDERLINE HIGH   HIGH     VERY HIGH   0 D-90 D    19 - 174         ----         ----        ----  91 D- 9 Y     0 -  74        75 -  99     >/= 100      ----    10-19 Y     0 -  89        90 - 129     >/= 130      ----    20-24 Y     0 - 114       115 - 149     >/= 150      ----         >24 Y     0 - 149       150 - 199    200- 499    >/= 500  .   Venipuncture immediately after or during the    administration of Metamizole may lead to falsely   low results. Testing should be performed immediately   prior to Metamizole dosing.      Non HDL Cholesterol 10/10/2022 86  0 - 149 mg/dL Final    Comment:     AGE      DESIRABLE   BORDERLINE HIGH   HIGH     VERY HIGH     0-19 Y     0 - 119       120 - 144     >/= 145    >/= 160    20-24 Y     0 - 149       150 - 189     >/= 190      ----         >24 Y    30 MG/DL ABOVE LDL CHOLESTEROL GOAL  .      Glucose 10/10/2022 86  74 - 99 mg/dL Final    Sodium 10/10/2022 140  136 - 145 mmol/L Final    Potassium 10/10/2022 4.0  3.5 - 5.3 mmol/L Final    Chloride 10/10/2022 103  98 - 107 mmol/L Final    Bicarbonate 10/10/2022 28  21 - 32 mmol/L Final    Anion Gap 10/10/2022 13  10 - 20 mmol/L Final    Urea Nitrogen 10/10/2022 8  6 - 23 mg/dL Final    Creatinine 10/10/2022 0.71  0.50 - 1.05 mg/dL Final    GFR Female 10/10/2022 >90  >90 mL/min/1.73m2 Final    Comment:  CALCULATIONS OF ESTIMATED GFR ARE PERFORMED   USING THE 2021 CKD-EPI STUDY REFIT EQUATION   WITHOUT THE RACE VARIABLE FOR THE IDMS-TRACEABLE   CREATININE METHODS.    https://jasn.asnjournals.org/content/early/2021/09/22/ASN.2625106641      Calcium 10/10/2022 9.8  8.6 - 10.6 mg/dL Final    Albumin 10/10/2022 4.8  3.4 - 5.0 g/dL Final    Alkaline Phosphatase 10/10/2022 38  33 - 110 U/L Final    Total Protein 10/10/2022 7.5  6.4 - 8.2 g/dL Final    AST 10/10/2022 14  9 - 39 U/L Final    Total Bilirubin 10/10/2022 0.9  0.0 - 1.2  mg/dL Final    ALT (SGPT) 10/10/2022 7  7 - 45 U/L Final    Comment:  Patients treated with Sulfasalazine may generate    falsely decreased results for ALT.     Legacy Encounter on 08/19/2022   Component Date Value Ref Range Status    WBC 08/19/2022 4.3 (L)  4.4 - 11.3 x10E9/L Final    nRBC 08/19/2022 0.0  0.0 - 0.0 /100 WBC Final    RBC 08/19/2022 4.87  4.00 - 5.20 x10E12/L Final    Hemoglobin 08/19/2022 15.2  12.0 - 16.0 g/dL Final    Hematocrit 08/19/2022 45.9  36.0 - 46.0 % Final    MCV 08/19/2022 94  80 - 100 fL Final    MCHC 08/19/2022 33.1  32.0 - 36.0 g/dL Final    Platelets 08/19/2022 233  150 - 450 x10E9/L Final    RDW 08/19/2022 11.9  11.5 - 14.5 % Final    Iron 08/19/2022 102  35 - 150 ug/dL Final    TIBC 08/19/2022 345  240 - 445 ug/dL Final    Iron Saturation 08/19/2022 30  25 - 45 % Final    Vitamin D, 25-Hydroxy 08/19/2022 50  ng/mL Final    Comment: .  DEFICIENCY:         < 20   NG/ML  INSUFFICIENCY:      20-29  NG/ML  SUFFICIENCY:         NG/ML    THIS ASSAY ACCURATELY QUANTIFIES THE SUM OF  VITAMIN D3, 25-HYDROXY AND VIT D2,25-HYDROXY.      Glucose 08/19/2022 97  74 - 99 mg/dL Final    Sodium 08/19/2022 141  136 - 145 mmol/L Final    Potassium 08/19/2022 4.4  3.5 - 5.3 mmol/L Final    Chloride 08/19/2022 103  98 - 107 mmol/L Final    Bicarbonate 08/19/2022 28  21 - 32 mmol/L Final    Anion Gap 08/19/2022 14  10 - 20 mmol/L Final    Urea Nitrogen 08/19/2022 12  6 - 23 mg/dL Final    Creatinine 08/19/2022 0.84  0.50 - 1.05 mg/dL Final    GFR Female 08/19/2022 >90  >90 mL/min/1.73m2 Final    Comment:  CALCULATIONS OF ESTIMATED GFR ARE PERFORMED   USING THE 2021 CKD-EPI STUDY REFIT EQUATION   WITHOUT THE RACE VARIABLE FOR THE IDMS-TRACEABLE   CREATININE METHODS.    https://jasn.asnjournals.org/content/early/2021/09/22/ASN.2442355734      Calcium 08/19/2022 10.4  8.6 - 10.6 mg/dL Final    Ferritin 08/19/2022 32  8 - 150 ug/L Final    TSH 08/19/2022 1.24  0.44 - 3.98 mIU/L Final    Comment:  TSH  testing is performed using different testing    methodology at [facility] than at other    [facility]. Direct result comparisons should    only be made within the same method.       Current Outpatient Medications on File Prior to Visit   Medication Sig Dispense Refill    albuterol 90 mcg/actuation inhaler Inhale 2 puffs every 4 hours if needed.       No current facility-administered medications on file prior to visit.     No images are attached to the encounter.            Assessment/Plan   Diagnoses and all orders for this visit:  Anorexia nervosa  Anxiety    Discussion with patient about continuing with the dietician weekly  2.   Patient to call for questions or concerns

## 2023-08-30 ENCOUNTER — OFFICE VISIT (OUTPATIENT)
Dept: PRIMARY CARE | Facility: CLINIC | Age: 23
End: 2023-08-30

## 2023-08-30 VITALS
HEART RATE: 69 BPM | BODY MASS INDEX: 20.38 KG/M2 | WEIGHT: 123.4 LBS | DIASTOLIC BLOOD PRESSURE: 78 MMHG | SYSTOLIC BLOOD PRESSURE: 112 MMHG

## 2023-08-30 DIAGNOSIS — R63.0 ANOREXIA: Primary | ICD-10-CM

## 2023-08-30 DIAGNOSIS — R19.7 DIARRHEA, UNSPECIFIED TYPE: ICD-10-CM

## 2023-08-30 PROCEDURE — 1036F TOBACCO NON-USER: CPT | Performed by: FAMILY MEDICINE

## 2023-08-30 PROCEDURE — 99214 OFFICE O/P EST MOD 30 MIN: CPT | Performed by: FAMILY MEDICINE

## 2023-08-30 ASSESSMENT — ENCOUNTER SYMPTOMS
ACTIVITY CHANGE: 0
HEADACHES: 0
DIZZINESS: 0
CHEST TIGHTNESS: 0
COLOR CHANGE: 0
COUGH: 0
PALPITATIONS: 0
FEVER: 0
BACK PAIN: 0
LIGHT-HEADEDNESS: 0
FACIAL ASYMMETRY: 0
ARTHRALGIAS: 0
APPETITE CHANGE: 0
CHOKING: 0
FATIGUE: 0

## 2023-08-30 NOTE — PROGRESS NOTES
"Subjective   Patient ID: Whit Chacko is a 23 y.o. female who presents for 4 week follow up .    HPI   Patient comes in that she is here to follow-up from recent visit to dietitian.  She has not had any episodes of lightheadedness dizziness and almost passing out.  Patient is a known anorexic.    She feels that her hair is thinning since May.   Stress this month has been a \"hot mess.\" Her dad is not doing well.     Review of Systems   Constitutional:  Negative for activity change, appetite change, fatigue and fever.   HENT:  Negative for congestion.    Respiratory:  Negative for cough, choking and chest tightness.    Cardiovascular:  Negative for chest pain, palpitations and leg swelling.   Musculoskeletal:  Negative for arthralgias, back pain and gait problem.   Skin:  Negative for color change and pallor.   Neurological:  Negative for dizziness, facial asymmetry, light-headedness and headaches.       Objective   /78 (BP Location: Left arm)   Pulse 69   Wt 56 kg (123 lb 6.4 oz)   BMI 20.38 kg/m²   BSA Body surface area is 1.61 meters squared.      Physical Exam  Constitutional:       General: She is not in acute distress.     Appearance: Normal appearance. She is not toxic-appearing.   HENT:      Head: Normocephalic.      Right Ear: Tympanic membrane, ear canal and external ear normal.      Left Ear: Tympanic membrane, ear canal and external ear normal.   Eyes:      Conjunctiva/sclera: Conjunctivae normal.      Pupils: Pupils are equal, round, and reactive to light.   Cardiovascular:      Rate and Rhythm: Normal rate and regular rhythm.      Pulses: Normal pulses.      Heart sounds: Normal heart sounds.   Pulmonary:      Effort: No respiratory distress.      Breath sounds: No wheezing, rhonchi or rales.   Musculoskeletal:         General: No swelling or tenderness.      Cervical back: No tenderness.   Skin:     Findings: No lesion or rash.   Neurological:      General: No focal deficit present.      " Mental Status: She is alert and oriented to person, place, and time. Mental status is at baseline.      Gait: Gait normal.   Psychiatric:         Mood and Affect: Mood normal.         Behavior: Behavior normal.         Thought Content: Thought content normal.         Judgment: Judgment normal.     Legacy Encounter on 02/13/2023   Component Date Value Ref Range Status   • Color, Urine 02/13/2023 YELLOW  STRAW,YELLOW Final   • Appearance, Urine 02/13/2023 CLEAR  CLEAR Final   • Specific Gravity, Urine 02/13/2023 1.020  1.005 - 1.035 Final   • pH, Urine 02/13/2023 5.0  5.0 - 8.0 Final   • Protein, Urine 02/13/2023 NEGATIVE  NEGATIVE mg/dL Final   • Glucose, Urine 02/13/2023 NEGATIVE  NEGATIVE mg/dL Final   • Blood, Urine 02/13/2023 NEGATIVE  NEGATIVE Final   • Ketones, Urine 02/13/2023 NEGATIVE  NEGATIVE mg/dL Final   • Bilirubin, Urine 02/13/2023 NEGATIVE  NEGATIVE Final   • Urobilinogen, Urine 02/13/2023 <2.0  0.0 - 1.9 mg/dL Final   • Nitrite, Urine 02/13/2023 NEGATIVE  NEGATIVE Final   • Leukocyte Esterase, Urine 02/13/2023 NEGATIVE  NEGATIVE Final   • Glucose 02/13/2023 95  74 - 99 mg/dL Final   • Sodium 02/13/2023 139  136 - 145 mmol/L Final   • Potassium 02/13/2023 4.6  3.5 - 5.3 mmol/L Final   • Chloride 02/13/2023 103  98 - 107 mmol/L Final   • Bicarbonate 02/13/2023 28  21 - 32 mmol/L Final   • Anion Gap 02/13/2023 13  10 - 20 mmol/L Final   • Urea Nitrogen 02/13/2023 12  6 - 23 mg/dL Final   • Creatinine 02/13/2023 0.85  0.50 - 1.05 mg/dL Final   • GFR Female 02/13/2023 >90  >90 mL/min/1.73m2 Final    Comment:  CALCULATIONS OF ESTIMATED GFR ARE PERFORMED   USING THE 2021 CKD-EPI STUDY REFIT EQUATION   WITHOUT THE RACE VARIABLE FOR THE IDMS-TRACEABLE   CREATININE METHODS.    https://jasn.asnjournals.org/content/early/2021/09/22/ASN.9351348591     • Calcium 02/13/2023 10.1  8.6 - 10.6 mg/dL Final   • Albumin 02/13/2023 4.9  3.4 - 5.0 g/dL Final   • Alkaline Phosphatase 02/13/2023 38  33 - 110 U/L Final   •  Total Protein 02/13/2023 7.4  6.4 - 8.2 g/dL Final   • AST 02/13/2023 54 (H)  9 - 39 U/L Final   • Total Bilirubin 02/13/2023 0.7  0.0 - 1.2 mg/dL Final   • ALT (SGPT) 02/13/2023 17  7 - 45 U/L Final    Comment:  Patients treated with Sulfasalazine may generate    falsely decreased results for ALT.     • WBC 02/13/2023 4.5  4.4 - 11.3 x10E9/L Final   • nRBC 02/13/2023 0.0  0.0 - 0.0 /100 WBC Final   • RBC 02/13/2023 4.81  4.00 - 5.20 x10E12/L Final   • Hemoglobin 02/13/2023 14.4  12.0 - 16.0 g/dL Final   • Hematocrit 02/13/2023 44.2  36.0 - 46.0 % Final   • MCV 02/13/2023 92  80 - 100 fL Final   • MCHC 02/13/2023 32.6  32.0 - 36.0 g/dL Final   • Platelets 02/13/2023 271  150 - 450 x10E9/L Final   • RDW 02/13/2023 12.1  11.5 - 14.5 % Final   • Neutrophils % 02/13/2023 56.9  40.0 - 80.0 % Final   • Immature Granulocytes %, Automated 02/13/2023 0.2  0.0 - 0.9 % Final    Comment:  Immature Granulocyte Count (IG) includes promyelocytes,    myelocytes and metamyelocytes but does not include bands.   Percent differential counts (%) should be interpreted in the   context of the absolute cell counts (cells/L).     • Lymphocytes % 02/13/2023 32.8  13.0 - 44.0 % Final   • Monocytes % 02/13/2023 6.5  2.0 - 10.0 % Final   • Eosinophils % 02/13/2023 2.5  0.0 - 6.0 % Final   • Basophils % 02/13/2023 1.1  0.0 - 2.0 % Final   • Neutrophils Absolute 02/13/2023 2.55  1.20 - 7.70 x10E9/L Final   • Lymphocytes Absolute 02/13/2023 1.47  1.20 - 4.80 x10E9/L Final   • Monocytes Absolute 02/13/2023 0.29  0.10 - 1.00 x10E9/L Final   • Eosinophils Absolute 02/13/2023 0.11  0.00 - 0.70 x10E9/L Final   • Basophils Absolute 02/13/2023 0.05  0.00 - 0.10 x10E9/L Final   • Phosphorus 02/13/2023 3.8  2.5 - 4.9 mg/dL Final    Comment:  The performance characteristics of phosphorus testing in   heparinized plasma have been validated by the individual     laboratory site where testing is performed. Testing    on heparinized plasma is not approved by  the FDA;    however, such approval is not necessary.     • Magnesium 02/13/2023 2.18  1.60 - 2.40 mg/dL Final   Legacy Encounter on 12/27/2022   Component Date Value Ref Range Status   • Hepatitis C Ab 12/27/2022 NONREACTIVE  NONREACTIVE Final    Comment:  Results from patients taking biotin supplements or receiving   high-dose biotin therapy should be interpreted with caution   due to possible interference with this test. Providers may    contact their local laboratory for further information.     Legacy Encounter on 12/08/2022   Component Date Value Ref Range Status   • DRUG SCREEN COMMENT URINE 12/08/2022 SEE BELOW   Final    Comment: Drug screen results are presumptive and should not be used to assess   compliance with prescribed medication. Definitive confirmatory drug testing   has been added to this sample for any positive screen result and will be   reported separately.   .  Toxicology screening results are reported qualitatively. The concentration   must be greater than or equal to the cutoff to be reported as positive. The   concentration at which the screening test can detect an individual drug or   metabolite varies. The absence of expected drug(s) and/or drug metabolite(s)   may indicate non-compliance, inappropriate timing of specimen collection   relative to drug administration, poor drug absorption, diluted/adulterated   urine, or limitations of testing. For medical purposes only; not valid for   forensic use.   .  Interpretive questions should be directed to the laboratory medical   directors.       • Amphetamine Screen, Urine 12/08/2022 PRESUMPTIVE NEGATIVE  NEGATIVE Final    Comment:  CUTOFF LEVEL:  500 NG/ML   Cross-reactivity has been reported with high concentrations   of the following drugs: buproprion, chloroquine, chlorpromazine,   ephedrine, mephentermine, fenfluramine, phentermine,   phenylpropanolamine, pseudoephedrine, and propranolol.     • Barbiturate Screen, Urine 12/08/2022  PRESUMPTIVE NEGATIVE  NEGATIVE Final     CUTOFF LEVEL: 200 NG/ML   • BENZODIAZEPINE (PRESENCE) IN URINE* 12/08/2022 PRESUMPTIVE NEGATIVE  NEGATIVE Final     CUTOFF LEVEL: 200 NG/ML   • Cannabinoid Screen, Urine 12/08/2022 PRESUMPTIVE NEGATIVE  NEGATIVE Final     CUTOFF LEVEL: 50 NG/ML   • Cocaine Screen, Urine 12/08/2022 PRESUMPTIVE NEGATIVE  NEGATIVE Final     CUTOFF LEVEL: 150 NG/ML   • Fentanyl, Ur 12/08/2022 PRESUMPTIVE NEGATIVE  NEGATIVE Final      CUTOFF LEVEL:  5 NG/ML   • Methadone Screen, Urine 12/08/2022 PRESUMPTIVE NEGATIVE  NEGATIVE Final    Comment:  CUTOFF LEVEL: 150 NG/ML   The metabolite L-alpha-acetylmethadol (LAAM) is not   detected by this method in concentrations that would   be found in the urine of patients on LAAM therapy.     • Opiate Screen, Urine 12/08/2022 PRESUMPTIVE NEGATIVE  NEGATIVE Final    Comment:  CUTOFF LEVEL: 300 NG/ML   The opiate screen does not detect fentanyl, meperidine, or    tramadol. Oxycodone is not consistently detected (refer to   Oxycodone Screen, Urine result).     • Oxycodone Screen, Ur 12/08/2022 PRESUMPTIVE NEGATIVE  NEGATIVE Final    Comment:  CUTOFF LEVEL: 100 NG/ML    This test will accurately detect both oxycodone and oxymorphone.          • PCP Screen, Urine 12/08/2022 PRESUMPTIVE NEGATIVE  NEGATIVE Final    Comment:  CUTOFF LEVEL:  25 NG/ML   Cross-reactivity has been reported with dextromethorphan.     Legacy Encounter on 11/25/2022   Component Date Value Ref Range Status   • SARS-CoV-2 Result 11/25/2022 NOT DETECTED  Not Detected Final    Comment: .  This test has received FDA Emergency Use Authorization (EUA) and has been   verified by [facility]. This test is only   authorized for the duration of time that circumstances exist to justify the   authorization of the emergency use of in vitro diagnostic tests for the   detection of SARS-CoV-2 virus and/or diagnosis of COVID-19 infection under   section 564(b)(1) of the Act, 21  U.S.C. 360bbb-3(b)(1), unless the   authorization is terminated or revoked sooner.     [facility] is certified under CLIA-88 as   qualified to perform high complexity testing. Testing is performed in the   [facility] laboratory located at [address] [place] [address].    SARS-CoV-2/Flu/RSV Multiplex Test:   Fact sheet for providers: https://www.fda.gov/media/157279/download  Fact sheet for patients: https://www.fda.gov/media/985489/download     Legacy Encounter on 10/10/2022   Component Date Value Ref Range Status   • Cholesterol 10/10/2022 148  0 - 199 mg/dL Final    Comment: .      AGE      DESIRABLE   BORDERLINE HIGH   HIGH     0-19 Y     0 - 169       170 - 199     >/= 200    20-24 Y     0 - 189       190 - 224     >/= 225         >24 Y     0 - 199       200 - 239     >/= 240   **All ranges are based on fasting samples. Specific   therapeutic targets will vary based on patient-specific   cardiac risk.  .   Pediatric guidelines reference:Pediatrics 2011, 128(S5).   Adult guidelines reference: NCEP ATPIII Guidelines,     ALMA ROSA 2001, 258:2486-97  .   Venipuncture immediately after or during the    administration of Metamizole may lead to falsely   low results. Testing should be performed immediately   prior to Metamizole dosing.     • HDL 10/10/2022 61.8  mg/dL Final    Comment: .      AGE      VERY LOW   LOW     NORMAL    HIGH       0-19 Y       < 35   < 40     40-45     ----    20-24 Y       ----   < 40       >45     ----      >24 Y       ----   < 40     40-60      >60  .     • Cholesterol/HDL Ratio 10/10/2022 2.4   Final    Comment: REF VALUES  DESIRABLE  < 3.4  HIGH RISK  > 5.0     • LDL 10/10/2022 78  0 - 119 mg/dL Final    Comment: .                           NEAR      BORD      AGE      DESIRABLE  OPTIMAL    HIGH     HIGH     VERY HIGH     0-19 Y     0 - 109     ---    110-129   >/= 130     ----    20-24 Y     0 - 119     ---    120-159   >/= 160     ----       >24 Y     0 -  99   100-129  130-159   160-189     >/=190  .     • VLDL 10/10/2022 8  0 - 40 mg/dL Final   • Triglycerides 10/10/2022 42  0 - 149 mg/dL Final    Comment: .      AGE      DESIRABLE   BORDERLINE HIGH   HIGH     VERY HIGH   0 D-90 D    19 - 174         ----         ----        ----  91 D- 9 Y     0 -  74        75 -  99     >/= 100      ----    10-19 Y     0 -  89        90 - 129     >/= 130      ----    20-24 Y     0 - 114       115 - 149     >/= 150      ----         >24 Y     0 - 149       150 - 199    200- 499    >/= 500  .   Venipuncture immediately after or during the    administration of Metamizole may lead to falsely   low results. Testing should be performed immediately   prior to Metamizole dosing.     • Non HDL Cholesterol 10/10/2022 86  0 - 149 mg/dL Final    Comment:     AGE      DESIRABLE   BORDERLINE HIGH   HIGH     VERY HIGH     0-19 Y     0 - 119       120 - 144     >/= 145    >/= 160    20-24 Y     0 - 149       150 - 189     >/= 190      ----         >24 Y    30 MG/DL ABOVE LDL CHOLESTEROL GOAL  .     • Glucose 10/10/2022 86  74 - 99 mg/dL Final   • Sodium 10/10/2022 140  136 - 145 mmol/L Final   • Potassium 10/10/2022 4.0  3.5 - 5.3 mmol/L Final   • Chloride 10/10/2022 103  98 - 107 mmol/L Final   • Bicarbonate 10/10/2022 28  21 - 32 mmol/L Final   • Anion Gap 10/10/2022 13  10 - 20 mmol/L Final   • Urea Nitrogen 10/10/2022 8  6 - 23 mg/dL Final   • Creatinine 10/10/2022 0.71  0.50 - 1.05 mg/dL Final   • GFR Female 10/10/2022 >90  >90 mL/min/1.73m2 Final    Comment:  CALCULATIONS OF ESTIMATED GFR ARE PERFORMED   USING THE 2021 CKD-EPI STUDY REFIT EQUATION   WITHOUT THE RACE VARIABLE FOR THE IDMS-TRACEABLE   CREATININE METHODS.    https://jasn.asnjournals.org/content/early/2021/09/22/ASN.[email or web address]     • Calcium 10/10/2022 9.8  8.6 - 10.6 mg/dL Final   • Albumin 10/10/2022 4.8  3.4 - 5.0 g/dL Final   • Alkaline Phosphatase 10/10/2022 38  33 - 110 U/L Final   • Total Protein  10/10/2022 7.5  6.4 - 8.2 g/dL Final   • AST 10/10/2022 14  9 - 39 U/L Final   • Total Bilirubin 10/10/2022 0.9  0.0 - 1.2 mg/dL Final   • ALT (SGPT) 10/10/2022 7  7 - 45 U/L Final    Comment:  Patients treated with Sulfasalazine may generate    falsely decreased results for ALT.       Current Outpatient Medications on File Prior to Visit   Medication Sig Dispense Refill   • albuterol 90 mcg/actuation inhaler Inhale 2 puffs every 4 hours if needed.       No current facility-administered medications on file prior to visit.     No images are attached to the encounter.            Assessment/Plan   Diagnoses and all orders for this visit:  Anorexia    Discussion with patient about continuing with the dietician weekly  2.   Patient to call for questions or concerns

## 2023-09-05 ENCOUNTER — TELEPHONE (OUTPATIENT)
Dept: PRIMARY CARE | Facility: CLINIC | Age: 23
End: 2023-09-05
Payer: COMMERCIAL
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
## 2023-09-21 ENCOUNTER — PATIENT MESSAGE (OUTPATIENT)
Dept: PRIMARY CARE | Facility: CLINIC | Age: 23
End: 2023-09-21
Payer: COMMERCIAL

## 2023-10-26 ENCOUNTER — LAB (OUTPATIENT)
Dept: LAB | Facility: LAB | Age: 23
End: 2023-10-26
Payer: COMMERCIAL

## 2023-10-26 DIAGNOSIS — R19.7 DIARRHEA, UNSPECIFIED TYPE: ICD-10-CM

## 2023-10-26 DIAGNOSIS — R63.0 ANOREXIA: ICD-10-CM

## 2023-10-26 LAB
ALBUMIN SERPL BCP-MCNC: 5 G/DL (ref 3.4–5)
ALP SERPL-CCNC: 33 U/L (ref 33–110)
ALT SERPL W P-5'-P-CCNC: 8 U/L (ref 7–45)
ANION GAP SERPL CALC-SCNC: 8 MMOL/L (ref 10–20)
AST SERPL W P-5'-P-CCNC: 14 U/L (ref 9–39)
BASOPHILS # BLD AUTO: 0.04 X10*3/UL (ref 0–0.1)
BASOPHILS NFR BLD AUTO: 0.6 %
BILIRUB SERPL-MCNC: 0.8 MG/DL (ref 0–1.2)
BUN SERPL-MCNC: 13 MG/DL (ref 6–23)
CALCIUM SERPL-MCNC: 9.5 MG/DL (ref 8.6–10.3)
CHLORIDE SERPL-SCNC: 102 MMOL/L (ref 98–107)
CO2 SERPL-SCNC: 30 MMOL/L (ref 21–32)
CREAT SERPL-MCNC: 0.74 MG/DL (ref 0.5–1.05)
EOSINOPHIL # BLD AUTO: 0.08 X10*3/UL (ref 0–0.7)
EOSINOPHIL NFR BLD AUTO: 1.2 %
ERYTHROCYTE [DISTWIDTH] IN BLOOD BY AUTOMATED COUNT: 11.9 % (ref 11.5–14.5)
GFR SERPL CREATININE-BSD FRML MDRD: >90 ML/MIN/1.73M*2
GLUCOSE SERPL-MCNC: 84 MG/DL (ref 74–99)
HCT VFR BLD AUTO: 41.1 % (ref 36–46)
HGB BLD-MCNC: 13.6 G/DL (ref 12–16)
IMM GRANULOCYTES # BLD AUTO: 0.01 X10*3/UL (ref 0–0.7)
IMM GRANULOCYTES NFR BLD AUTO: 0.1 % (ref 0–0.9)
LYMPHOCYTES # BLD AUTO: 2.78 X10*3/UL (ref 1.2–4.8)
LYMPHOCYTES NFR BLD AUTO: 41.6 %
MCH RBC QN AUTO: 30.8 PG (ref 26–34)
MCHC RBC AUTO-ENTMCNC: 33.1 G/DL (ref 32–36)
MCV RBC AUTO: 93 FL (ref 80–100)
MONOCYTES # BLD AUTO: 0.45 X10*3/UL (ref 0.1–1)
MONOCYTES NFR BLD AUTO: 6.7 %
NEUTROPHILS # BLD AUTO: 3.33 X10*3/UL (ref 1.2–7.7)
NEUTROPHILS NFR BLD AUTO: 49.8 %
NRBC BLD-RTO: 0 /100 WBCS (ref 0–0)
PLATELET # BLD AUTO: 231 X10*3/UL (ref 150–450)
PMV BLD AUTO: 9.7 FL (ref 7.5–11.5)
POTASSIUM SERPL-SCNC: 4 MMOL/L (ref 3.5–5.3)
PROT SERPL-MCNC: 7.2 G/DL (ref 6.4–8.2)
RBC # BLD AUTO: 4.42 X10*6/UL (ref 4–5.2)
SODIUM SERPL-SCNC: 136 MMOL/L (ref 136–145)
WBC # BLD AUTO: 6.7 X10*3/UL (ref 4.4–11.3)

## 2023-10-26 PROCEDURE — 82785 ASSAY OF IGE: CPT

## 2023-10-26 PROCEDURE — 86003 ALLG SPEC IGE CRUDE XTRC EA: CPT

## 2023-10-26 PROCEDURE — 36415 COLL VENOUS BLD VENIPUNCTURE: CPT

## 2023-10-26 PROCEDURE — 80053 COMPREHEN METABOLIC PANEL: CPT

## 2023-10-26 PROCEDURE — 85025 COMPLETE CBC W/AUTO DIFF WBC: CPT

## 2023-10-27 LAB
A ALTERNATA IGE QN: <0.1 KU/L
A FUMIGATUS IGE QN: <0.1 KU/L
BERMUDA GRASS IGE QN: <0.1 KU/L
BOXELDER IGE QN: <0.1 KU/L
C HERBARUM IGE QN: <0.1 KU/L
CALIF WALNUT POLN IGE QN: 0.35
CAT DANDER IGE QN: <0.1 KU/L
CLAM IGE QN: <0.1 KU/L
CMN PIGWEED IGE QN: <0.1 KU/L
CODFISH IGE QN: <0.1 KU/L
COMMON RAGWEED IGE QN: <0.1 KU/L
CORN IGE QN: <0.1
COTTONWOOD IGE QN: <0.1 KU/L
D FARINAE IGE QN: 1 KU/L
D PTERONYSS IGE QN: 0.74 KU/L
DOG DANDER IGE QN: <0.1 KU/L
EGG WHITE IGE QN: <0.1 KU/L
ENGL PLANTAIN IGE QN: <0.1
GOOSEFOOT IGE QN: <0.1 KU/L
JOHNSON GRASS IGE QN: <0.1 KU/L
KENT BLUE GRASS IGE QN: 0.61 KU/L
LONDON PLANE IGE QN: <0.1
MILK IGE QN: <0.1 KU/L
MT JUNIPER IGE QN: <0.1
P NOTATUM IGE QN: <0.1 KU/L
PEANUT IGE QN: <0.1 KU/L
PECAN/HICK TREE IGE QN: 0.77
ROACH IGE QN: <0.1 KU/L
SALTWORT IGE QN: <0.1 KU/L
SCALLOP IGE QN: <0.1 KU/L
SESAME SEED IGE QN: <0.1 KU/L
SHEEP SORREL IGE QN: <0.1 KU/L
SHRIMP IGE QN: <0.1 KU/L
SILVER BIRCH IGE QN: <0.1 KU/L
SOYBEAN IGE QN: <0.1 KU/L
TIMOTHY IGE QN: 0.57 KU/L
TOTAL IGE SMQN RAST: 18.7 KU/L
WALNUT IGE QN: <0.1 KU/L
WHEAT IGE QN: <0.1 KU/L
WHITE ASH IGE QN: <0.1 KU/L
WHITE ELM IGE QN: <0.1 KU/L
WHITE MULBERRY IGE QN: <0.1
WHITE OAK IGE QN: <0.1 KU/L

## 2023-10-30 ENCOUNTER — OFFICE VISIT (OUTPATIENT)
Dept: PRIMARY CARE | Facility: CLINIC | Age: 23
End: 2023-10-30
Payer: COMMERCIAL

## 2023-10-30 VITALS — SYSTOLIC BLOOD PRESSURE: 112 MMHG | DIASTOLIC BLOOD PRESSURE: 78 MMHG | HEART RATE: 56 BPM

## 2023-10-30 DIAGNOSIS — R63.0 ANOREXIA: Primary | ICD-10-CM

## 2023-10-30 DIAGNOSIS — F41.9 ANXIETY: ICD-10-CM

## 2023-10-30 PROCEDURE — 1036F TOBACCO NON-USER: CPT | Performed by: FAMILY MEDICINE

## 2023-10-30 PROCEDURE — 99214 OFFICE O/P EST MOD 30 MIN: CPT | Performed by: FAMILY MEDICINE

## 2023-10-30 ASSESSMENT — ENCOUNTER SYMPTOMS
CHOKING: 0
HEADACHES: 0
LIGHT-HEADEDNESS: 0
FEVER: 0
ACTIVITY CHANGE: 0
FACIAL ASYMMETRY: 0
APPETITE CHANGE: 0
FATIGUE: 0
BACK PAIN: 0
ARTHRALGIAS: 0
PALPITATIONS: 0
CHEST TIGHTNESS: 0
DIZZINESS: 0
COUGH: 0
COLOR CHANGE: 0

## 2023-10-30 NOTE — PROGRESS NOTES
Subjective   Patient ID: Whit Chacko is a 23 y.o. female who presents for Follow-up.    HPI   Patient comes in that she is here to follow-up from recent visit to dietitian.  She has not had any episodes of lightheadedness dizziness and almost passing out.  Patient is a known anorexic.    She feels that her hair is thinning since May.   Stress this month has been hard since she started her new job with the [employer]. She does like her job and is excited to be there.     Review of Systems   Constitutional:  Negative for activity change, appetite change, fatigue and fever.   HENT:  Negative for congestion.    Respiratory:  Negative for cough, choking and chest tightness.    Cardiovascular:  Negative for chest pain, palpitations and leg swelling.   Musculoskeletal:  Negative for arthralgias, back pain and gait problem.   Skin:  Negative for color change and pallor.   Neurological:  Negative for dizziness, facial asymmetry, light-headedness and headaches.       Objective   /78 (BP Location: Left arm)   Pulse 56   BSA There is no height or weight on file to calculate BSA.      Physical Exam  Constitutional:       General: She is not in acute distress.     Appearance: Normal appearance. She is not toxic-appearing.   HENT:      Head: Normocephalic.      Right Ear: Tympanic membrane, ear canal and external ear normal.      Left Ear: Tympanic membrane, ear canal and external ear normal.   Eyes:      Conjunctiva/sclera: Conjunctivae normal.      Pupils: Pupils are equal, round, and reactive to light.   Cardiovascular:      Rate and Rhythm: Normal rate and regular rhythm.      Pulses: Normal pulses.      Heart sounds: Normal heart sounds.   Pulmonary:      Effort: No respiratory distress.      Breath sounds: No wheezing, rhonchi or rales.   Musculoskeletal:         General: No swelling or tenderness.      Cervical back: No tenderness.   Skin:     Findings: No lesion or rash.   Neurological:      General: No focal  deficit present.      Mental Status: She is alert and oriented to person, place, and time. Mental status is at baseline.      Gait: Gait normal.   Psychiatric:         Mood and Affect: Mood normal.         Behavior: Behavior normal.         Thought Content: Thought content normal.         Judgment: Judgment normal.       Lab on 10/26/2023   Component Date Value Ref Range Status    WBC 10/26/2023 6.7  4.4 - 11.3 x10*3/uL Final    nRBC 10/26/2023 0.0  0.0 - 0.0 /100 WBCs Final    RBC 10/26/2023 4.42  4.00 - 5.20 x10*6/uL Final    Hemoglobin 10/26/2023 13.6  12.0 - 16.0 g/dL Final    Hematocrit 10/26/2023 41.1  36.0 - 46.0 % Final    MCV 10/26/2023 93  80 - 100 fL Final    MCH 10/26/2023 30.8  26.0 - 34.0 pg Final    MCHC 10/26/2023 33.1  32.0 - 36.0 g/dL Final    RDW 10/26/2023 11.9  11.5 - 14.5 % Final    Platelets 10/26/2023 231  150 - 450 x10*3/uL Final    MPV 10/26/2023 9.7  7.5 - 11.5 fL Final    Neutrophils % 10/26/2023 49.8  40.0 - 80.0 % Final    Immature Granulocytes %, Automated 10/26/2023 0.1  0.0 - 0.9 % Final    Immature Granulocyte Count (IG) includes promyelocytes, myelocytes and metamyelocytes but does not include bands. Percent differential counts (%) should be interpreted in the context of the absolute cell counts (cells/UL).    Lymphocytes % 10/26/2023 41.6  13.0 - 44.0 % Final    Monocytes % 10/26/2023 6.7  2.0 - 10.0 % Final    Eosinophils % 10/26/2023 1.2  0.0 - 6.0 % Final    Basophils % 10/26/2023 0.6  0.0 - 2.0 % Final    Neutrophils Absolute 10/26/2023 3.33  1.20 - 7.70 x10*3/uL Final    Percent differential counts (%) should be interpreted in the context of the absolute cell counts (cells/uL).    Immature Granulocytes Absolute, Au* 10/26/2023 0.01  0.00 - 0.70 x10*3/uL Final    Lymphocytes Absolute 10/26/2023 2.78  1.20 - 4.80 x10*3/uL Final    Monocytes Absolute 10/26/2023 0.45  0.10 - 1.00 x10*3/uL Final    Eosinophils Absolute 10/26/2023 0.08  0.00 - 0.70 x10*3/uL Final    Basophils  Absolute 10/26/2023 0.04  0.00 - 0.10 x10*3/uL Final    Glucose 10/26/2023 84  74 - 99 mg/dL Final    Sodium 10/26/2023 136  136 - 145 mmol/L Final    Potassium 10/26/2023 4.0  3.5 - 5.3 mmol/L Final    Chloride 10/26/2023 102  98 - 107 mmol/L Final    Bicarbonate 10/26/2023 30  21 - 32 mmol/L Final    Anion Gap 10/26/2023 8 (L)  10 - 20 mmol/L Final    Urea Nitrogen 10/26/2023 13  6 - 23 mg/dL Final    Creatinine 10/26/2023 0.74  0.50 - 1.05 mg/dL Final    eGFR 10/26/2023 >90  >60 mL/min/1.73m*2 Final    Calculations of estimated GFR are performed using the 2021 CKD-EPI Study Refit equation without the race variable for the IDMS-Traceable creatinine methods.  https://jasn.asnjournals.org/content/early/2021/09/22/ASN.6492076770    Calcium 10/26/2023 9.5  8.6 - 10.3 mg/dL Final    Albumin 10/26/2023 5.0  3.4 - 5.0 g/dL Final    Alkaline Phosphatase 10/26/2023 33  33 - 110 U/L Final    Total Protein 10/26/2023 7.2  6.4 - 8.2 g/dL Final    AST 10/26/2023 14  9 - 39 U/L Final    Bilirubin, Total 10/26/2023 0.8  0.0 - 1.2 mg/dL Final    ALT 10/26/2023 8  7 - 45 U/L Final    Patients treated with Sulfasalazine may generate falsely decreased results for ALT.    Clam IgE 10/26/2023 <0.10  <0.10 kU/L Final    Fish (Cod) IgE 10/26/2023 <0.10  <0.10 kU/L Final    [place], [place] IgE 10/26/2023 <0.10   Final    Egg White IgE 10/26/2023 <0.10  <0.10 kU/L Final    Cow's Milk IgE 10/26/2023 <0.10  <0.10 kU/L Final    Peanut IgE 10/26/2023 <0.10  <0.10 kU/L Final    Scallop IgE 10/26/2023 <0.10  <0.10 kU/L Final    Sesame Seed IgE 10/26/2023 <0.10  <0.10 kU/L Final    Shrimp IgE 10/26/2023 <0.10  <0.10 kU/L Final    Soybean IgE 10/26/2023 <0.10  <0.10 kU/L Final    [place] IgE 10/26/2023 <0.10  <0.10 kU/L Final    Wheat IgE 10/26/2023 <0.10  <0.10 kU/L Final    Immunocap IgE 10/26/2023 18.7  <=214 KU/L Final    Note: Omalizumab (Xolair, GenentAorTx; humanized  IgG1 antihuman IgE Fc) treatment does not  significantly interfere with the  accuracy of  total IgE on the ImmunoCAP (Leo) platform.  J Allergy Clin Immunol 2006;117:759-66).  Allergens, parasitic diseases, smoking, and  alcohol consumption have been reported to  increase levels of total IgE in serum.    [place] Grass IgE 10/26/2023 <0.10  <0.10 kU/L Final    [place] Grass IgE 10/26/2023 <0.10  <0.10 kU/L Final    [place], [place] Blue IgE 10/26/2023 0.61 (Low)  <0.10 kU/L Final    Jay Grass IgE 10/26/2023 0.57 (Low)  <0.10 kU/L Final    [place], [place] IgE 10/26/2023 <0.10  <0.10 kU/L Final    Common Pigweed IgE 10/26/2023 <0.10  <0.10 kU/L Final    Common Ragweed IgE 10/26/2023 <0.10  <0.10 kU/L Final    White [place] IgE 10/26/2023 <0.10  <0.10 kU/L Final    Common Silver Birch IgE 10/26/2023 <0.10  <0.10 kU/L Final    Box-Elder IgE 10/26/2023 <0.10  <0.10 kU/L Final    [place] IgE 10/26/2023 <0.10  <0.10 Final    [place] IgE 10/26/2023 <0.10  <0.10 kU/L Final    Elm IgE 10/26/2023 <0.10  <0.10 kU/L Final    [place] IgE 10/26/2023 <0.10  <0.10 Final    Pec[place], [place] IgE 10/26/2023 0.77 (Mod)  <0.10 Final    Maple [place] * 10/26/2023 <0.10  <0.10 Final    [place] Tree IgE 10/26/2023 0.35 (Low)  <0.10 Final    Russian Thistle IgE 10/26/2023 <0.10  <0.10 kU/L Final    Sheep [place] IgE 10/26/2023 <0.10  <0.10 kU/L Final    Cat Dander IgE 10/26/2023 <0.10  <0.10 kU/L Final    Dog Dander IgE 10/26/2023 <0.10  <0.10 kU/L Final    Alternaria Alternata IgE 10/26/2023 <0.10  <0.10 kU/L Final    Cladosporium Herbarum IgE 10/26/2023 <0.10  <0.10 kU/L Final    English Plantain IgE 10/26/2023 <0.10  <0.10 Final    Dust Mite (D. farinae) IgE 10/26/2023 1.00 (Mod)  <0.10 kU/L Final    Dust Mite (D. pteronyssinus) IgE 10/26/2023 0.74 (Mod)  <0.10 kU/L Final    [place] Cockroach IgE 10/26/2023 <0.10  <0.10 kU/L Final    Aspergillus Fumigatus IgE 10/26/2023 <0.10  <0.10 kU/L Final    [place] IgE 10/26/2023 <0.10  <0.10 kU/L Final    Penicillium Chrysogenum  IgE 10/26/2023 <0.10  <0.10 kU/L Final   [facility] Encounter on 02/13/2023   Component Date Value Ref Range Status    Color, Urine 02/13/2023 YELLOW  STRAW,YELLOW Final    Appearance, Urine 02/13/2023 CLEAR  CLEAR Final    Specific Gravity, Urine 02/13/2023 1.020  1.005 - 1.035 Final    pH, Urine 02/13/2023 5.0  5.0 - 8.0 Final    Protein, Urine 02/13/2023 NEGATIVE  NEGATIVE mg/dL Final    Glucose, Urine 02/13/2023 NEGATIVE  NEGATIVE mg/dL Final    Blood, Urine 02/13/2023 NEGATIVE  NEGATIVE Final    Ketones, Urine 02/13/2023 NEGATIVE  NEGATIVE mg/dL Final    Bilirubin, Urine 02/13/2023 NEGATIVE  NEGATIVE Final    Urobilinogen, Urine 02/13/2023 <2.0  0.0 - 1.9 mg/dL Final    Nitrite, Urine 02/13/2023 NEGATIVE  NEGATIVE Final    Leukocyte Esterase, Urine 02/13/2023 NEGATIVE  NEGATIVE Final    Glucose 02/13/2023 95  74 - 99 mg/dL Final    Sodium 02/13/2023 139  136 - 145 mmol/L Final    Potassium 02/13/2023 4.6  3.5 - 5.3 mmol/L Final    Chloride 02/13/2023 103  98 - 107 mmol/L Final    Bicarbonate 02/13/2023 28  21 - 32 mmol/L Final    Anion Gap 02/13/2023 13  10 - 20 mmol/L Final    Urea Nitrogen 02/13/2023 12  6 - 23 mg/dL Final    Creatinine 02/13/2023 0.85  0.50 - 1.05 mg/dL Final    GFR Female 02/13/2023 >90  >90 mL/min/1.73m2 Final    Comment:  CALCULATIONS OF ESTIMATED GFR ARE PERFORMED   USING THE 2021 CKD-EPI STUDY REFIT EQUATION   WITHOUT THE RACE VARIABLE FOR THE IDMS-TRACEABLE   CREATININE METHODS.    https://jasn.asnjournals.org/content/early/2021/09/22/ASN.5890006540      Calcium 02/13/2023 10.1  8.6 - 10.6 mg/dL Final    Albumin 02/13/2023 4.9  3.4 - 5.0 g/dL Final    Alkaline Phosphatase 02/13/2023 38  33 - 110 U/L Final    Total Protein 02/13/2023 7.4  6.4 - 8.2 g/dL Final    AST 02/13/2023 54 (H)  9 - 39 U/L Final    Total Bilirubin 02/13/2023 0.7  0.0 - 1.2 mg/dL Final    ALT (SGPT) 02/13/2023 17  7 - 45 U/L Final    Comment:  Patients treated with Sulfasalazine may generate    falsely decreased  results for ALT.      WBC 02/13/2023 4.5  4.4 - 11.3 x10E9/L Final    nRBC 02/13/2023 0.0  0.0 - 0.0 /100 WBC Final    RBC 02/13/2023 4.81  4.00 - 5.20 x10E12/L Final    Hemoglobin 02/13/2023 14.4  12.0 - 16.0 g/dL Final    Hematocrit 02/13/2023 44.2  36.0 - 46.0 % Final    MCV 02/13/2023 92  80 - 100 fL Final    MCHC 02/13/2023 32.6  32.0 - 36.0 g/dL Final    Platelets 02/13/2023 271  150 - 450 x10E9/L Final    RDW 02/13/2023 12.1  11.5 - 14.5 % Final    Neutrophils % 02/13/2023 56.9  40.0 - 80.0 % Final    Immature Granulocytes %, Automated 02/13/2023 0.2  0.0 - 0.9 % Final    Comment:  Immature Granulocyte Count (IG) includes promyelocytes,    myelocytes and metamyelocytes but does not include bands.   Percent differential counts (%) should be interpreted in the   context of the absolute cell counts (cells/L).      Lymphocytes % 02/13/2023 32.8  13.0 - 44.0 % Final    Monocytes % 02/13/2023 6.5  2.0 - 10.0 % Final    Eosinophils % 02/13/2023 2.5  0.0 - 6.0 % Final    Basophils % 02/13/2023 1.1  0.0 - 2.0 % Final    Neutrophils Absolute 02/13/2023 2.55  1.20 - 7.70 x10E9/L Final    Lymphocytes Absolute 02/13/2023 1.47  1.20 - 4.80 x10E9/L Final    Monocytes Absolute 02/13/2023 0.29  0.10 - 1.00 x10E9/L Final    Eosinophils Absolute 02/13/2023 0.11  0.00 - 0.70 x10E9/L Final    Basophils Absolute 02/13/2023 0.05  0.00 - 0.10 x10E9/L Final    Phosphorus 02/13/2023 3.8  2.5 - 4.9 mg/dL Final    Comment:  The performance characteristics of phosphorus testing in   heparinized plasma have been validated by the individual     laboratory site where testing is performed. Testing    on heparinized plasma is not approved by the FDA;    however, such approval is not necessary.      Magnesium 02/13/2023 2.18  1.60 - 2.40 mg/dL Final   [facility] Encounter on 12/27/2022   Component Date Value Ref Range Status    Hepatitis C Ab 12/27/2022 NONREACTIVE  NONREACTIVE Final    Comment:  Results from patients taking biotin supplements  or receiving   high-dose biotin therapy should be interpreted with caution   due to possible interference with this test. Providers may    contact their local laboratory for further information.     [facility] Encounter on 12/08/2022   Component Date Value Ref Range Status    DRUG SCREEN COMMENT URINE 12/08/2022 SEE BELOW   Final    Comment: Drug screen results are presumptive and should not be used to assess   compliance with prescribed medication. Definitive confirmatory drug testing   has been added to this sample for any positive screen result and will be   reported separately.   .  Toxicology screening results are reported qualitatively. The concentration   must be greater than or equal to the cutoff to be reported as positive. The   concentration at which the screening test can detect an individual drug or   metabolite varies. The absence of expected drug(s) and/or drug metabolite(s)   may indicate non-compliance, inappropriate timing of specimen collection   relative to drug administration, poor drug absorption, diluted/adulterated   urine, or limitations of testing. For medical purposes only; not valid for   forensic use.   .  Interpretive questions should be directed to the laboratory medical   directors.        Amphetamine Screen, Urine 12/08/2022 PRESUMPTIVE NEGATIVE  NEGATIVE Final    Comment:  CUTOFF LEVEL:  500 NG/ML   Cross-reactivity has been reported with high concentrations   of the following drugs: buproprion, chloroquine, chlorpromazine,   ephedrine, mephentermine, fenfluramine, phentermine,   phenylpropanolamine, pseudoephedrine, and propranolol.      Barbiturate Screen, Urine 12/08/2022 PRESUMPTIVE NEGATIVE  NEGATIVE Final     CUTOFF LEVEL: 200 NG/ML    BENZODIAZEPINE (PRESENCE) IN URINE* 12/08/2022 PRESUMPTIVE NEGATIVE  NEGATIVE Final     CUTOFF LEVEL: 200 NG/ML    Cannabinoid Screen, Urine 12/08/2022 PRESUMPTIVE NEGATIVE  NEGATIVE Final     CUTOFF LEVEL: 50 NG/ML    Cocaine Screen, Urine  12/08/2022 PRESUMPTIVE NEGATIVE  NEGATIVE Final     CUTOFF LEVEL: 150 NG/ML    Fentanyl, Ur 12/08/2022 PRESUMPTIVE NEGATIVE  NEGATIVE Final      CUTOFF LEVEL:  5 NG/ML    Methadone Screen, Urine 12/08/2022 PRESUMPTIVE NEGATIVE  NEGATIVE Final    Comment:  CUTOFF LEVEL: 150 NG/ML   The metabolite L-alpha-acetylmethadol (LAAM) is not   detected by this method in concentrations that would   be found in the urine of patients on LAAM therapy.      Opiate Screen, Urine 12/08/2022 PRESUMPTIVE NEGATIVE  NEGATIVE Final    Comment:  CUTOFF LEVEL: 300 NG/ML   The opiate screen does not detect fentanyl, meperidine, or    tramadol. Oxycodone is not consistently detected (refer to   Oxycodone Screen, Urine result).      Oxycodone Screen, Ur 12/08/2022 PRESUMPTIVE NEGATIVE  NEGATIVE Final    Comment:  CUTOFF LEVEL: 100 NG/ML    This test will accurately detect both oxycodone and oxymorphone.           PCP Screen, Urine 12/08/2022 PRESUMPTIVE NEGATIVE  NEGATIVE Final    Comment:  CUTOFF LEVEL:  25 NG/ML   Cross-reactivity has been reported with dextromethorphan.     [facility]acy Encounter on 11/25/2022   Component Date Value Ref Range Status    SARS-CoV-2 Result 11/25/2022 NOT DETECTED  Not Detected Final    Comment: .  This test has received FDA Emergency Use Authorization (EUA) and has been   verified by [facility]. This test is only   authorized for the duration of time that circumstances exist to justify the   authorization of the emergency use of in vitro diagnostic tests for the   detection of SARS-CoV-2 virus and/or diagnosis of COVID-19 infection under   section 564(b)(1) of the Act, 21 U.S.C. 360bbb-3(b)(1), unless the   authorization is terminated or revoked sooner.     [facility] is certified under CLIA-88 as   qualified to perform high complexity testing. Testing is performed in the   [facility] laboratory located at [address] [place] [address]    [id number].    SARS-CoV-2/Flu/RSV Multiplex Test:   Fact sheet for providers: https://www.fda.gov/media/842804/download  Fact sheet for patients: https://www.fda.gov/media/528427/download       Current Outpatient Medications on File Prior to Visit   Medication Sig Dispense Refill    albuterol 90 mcg/actuation inhaler Inhale 2 puffs every 4 hours if needed.       No current facility-administered medications on file prior to visit.     No images are attached to the encounter.            Assessment/Plan   Diagnoses and all orders for this visit:  Anorexia  Anxiety    Discussion with patient about continuing with the dietician weekly  2.   Patient to call for questions or concerns

## 2023-12-11 ENCOUNTER — OFFICE VISIT (OUTPATIENT)
Dept: PRIMARY CARE | Facility: CLINIC | Age: 23
End: 2023-12-11
Payer: COMMERCIAL

## 2023-12-11 VITALS
BODY MASS INDEX: 20.41 KG/M2 | WEIGHT: 123.6 LBS | SYSTOLIC BLOOD PRESSURE: 114 MMHG | DIASTOLIC BLOOD PRESSURE: 72 MMHG | HEART RATE: 78 BPM

## 2023-12-11 DIAGNOSIS — F50.00 ANOREXIA NERVOSA (MULTI): ICD-10-CM

## 2023-12-11 DIAGNOSIS — R63.0 ANOREXIA: Primary | ICD-10-CM

## 2023-12-11 DIAGNOSIS — F41.9 ANXIETY: ICD-10-CM

## 2023-12-11 PROCEDURE — 1036F TOBACCO NON-USER: CPT | Performed by: FAMILY MEDICINE

## 2023-12-11 PROCEDURE — 99214 OFFICE O/P EST MOD 30 MIN: CPT | Performed by: FAMILY MEDICINE

## 2023-12-11 ASSESSMENT — ENCOUNTER SYMPTOMS
BACK PAIN: 0
APPETITE CHANGE: 0
ARTHRALGIAS: 0
PALPITATIONS: 0
COUGH: 0
ACTIVITY CHANGE: 0
FACIAL ASYMMETRY: 0
DIZZINESS: 0
FATIGUE: 0
CHOKING: 0
HEADACHES: 0
FEVER: 0
LIGHT-HEADEDNESS: 0
CHEST TIGHTNESS: 0
COLOR CHANGE: 0

## 2023-12-11 NOTE — PROGRESS NOTES
Subjective   Patient ID: Whit Chacko is a 23 y.o. female who presents for Follow-up.    HPI   Patient comes in that she is here to follow-up.    Stress this month has been consistent since started her new job with the [employer]. She does like her job and is excited to be there. She is now going to be working with one doctor specifically. She is still seeing her counselor and dietician.     Review of Systems   Constitutional:  Negative for activity change, appetite change, fatigue and fever.   HENT:  Negative for congestion.    Respiratory:  Negative for cough, choking and chest tightness.    Cardiovascular:  Negative for chest pain, palpitations and leg swelling.   Musculoskeletal:  Negative for arthralgias, back pain and gait problem.   Skin:  Negative for color change and pallor.   Neurological:  Negative for dizziness, facial asymmetry, light-headedness and headaches.       Objective   /72 (BP Location: Left arm)   Pulse 78   Wt 56.1 kg (123 lb 9.6 oz)   BMI 20.41 kg/m²   BSA Body surface area is 1.61 meters squared.      Physical Exam  Constitutional:       General: She is not in acute distress.     Appearance: Normal appearance. She is not toxic-appearing.   HENT:      Head: Normocephalic.      Right Ear: Tympanic membrane, ear canal and external ear normal.      Left Ear: Tympanic membrane, ear canal and external ear normal.   Eyes:      Conjunctiva/sclera: Conjunctivae normal.      Pupils: Pupils are equal, round, and reactive to light.   Cardiovascular:      Rate and Rhythm: Normal rate and regular rhythm.      Pulses: Normal pulses.      Heart sounds: Normal heart sounds.   Pulmonary:      Effort: No respiratory distress.      Breath sounds: No wheezing, rhonchi or rales.   Musculoskeletal:         General: No swelling or tenderness.      Cervical back: No tenderness.   Skin:     Findings: No lesion or rash.   Neurological:      General: No focal deficit present.      Mental Status: She is  alert and oriented to person, place, and time. Mental status is at baseline.      Gait: Gait normal.   Psychiatric:         Mood and Affect: Mood normal.         Behavior: Behavior normal.         Thought Content: Thought content normal.         Judgment: Judgment normal.       Lab on 10/26/2023   Component Date Value Ref Range Status    WBC 10/26/2023 6.7  4.4 - 11.3 x10*3/uL Final    nRBC 10/26/2023 0.0  0.0 - 0.0 /100 WBCs Final    RBC 10/26/2023 4.42  4.00 - 5.20 x10*6/uL Final    Hemoglobin 10/26/2023 13.6  12.0 - 16.0 g/dL Final    Hematocrit 10/26/2023 41.1  36.0 - 46.0 % Final    MCV 10/26/2023 93  80 - 100 fL Final    MCH 10/26/2023 30.8  26.0 - 34.0 pg Final    MCHC 10/26/2023 33.1  32.0 - 36.0 g/dL Final    RDW 10/26/2023 11.9  11.5 - 14.5 % Final    Platelets 10/26/2023 231  150 - 450 x10*3/uL Final    MPV 10/26/2023 9.7  7.5 - 11.5 fL Final    Neutrophils % 10/26/2023 49.8  40.0 - 80.0 % Final    Immature Granulocytes %, Automated 10/26/2023 0.1  0.0 - 0.9 % Final    Immature Granulocyte Count (IG) includes promyelocytes, myelocytes and metamyelocytes but does not include bands. Percent differential counts (%) should be interpreted in the context of the absolute cell counts (cells/UL).    Lymphocytes % 10/26/2023 41.6  13.0 - 44.0 % Final    Monocytes % 10/26/2023 6.7  2.0 - 10.0 % Final    Eosinophils % 10/26/2023 1.2  0.0 - 6.0 % Final    Basophils % 10/26/2023 0.6  0.0 - 2.0 % Final    Neutrophils Absolute 10/26/2023 3.33  1.20 - 7.70 x10*3/uL Final    Percent differential counts (%) should be interpreted in the context of the absolute cell counts (cells/uL).    Immature Granulocytes Absolute, Au* 10/26/2023 0.01  0.00 - 0.70 x10*3/uL Final    Lymphocytes Absolute 10/26/2023 2.78  1.20 - 4.80 x10*3/uL Final    Monocytes Absolute 10/26/2023 0.45  0.10 - 1.00 x10*3/uL Final    Eosinophils Absolute 10/26/2023 0.08  0.00 - 0.70 x10*3/uL Final    Basophils Absolute 10/26/2023 0.04  0.00 - 0.10 x10*3/uL  Final    Glucose 10/26/2023 84  74 - 99 mg/dL Final    Sodium 10/26/2023 136  136 - 145 mmol/L Final    Potassium 10/26/2023 4.0  3.5 - 5.3 mmol/L Final    Chloride 10/26/2023 102  98 - 107 mmol/L Final    Bicarbonate 10/26/2023 30  21 - 32 mmol/L Final    Anion Gap 10/26/2023 8 (L)  10 - 20 mmol/L Final    Urea Nitrogen 10/26/2023 13  6 - 23 mg/dL Final    Creatinine 10/26/2023 0.74  0.50 - 1.05 mg/dL Final    eGFR 10/26/2023 >90  >60 mL/min/1.73m*2 Final    Calculations of estimated GFR are performed using the 2021 CKD-EPI Study Refit equation without the race variable for the IDMS-Traceable creatinine methods.  https://jasn.asnjournals.org/content/early/2021/09/22/ASN.9092499983    Calcium 10/26/2023 9.5  8.6 - 10.3 mg/dL Final    Albumin 10/26/2023 5.0  3.4 - 5.0 g/dL Final    Alkaline Phosphatase 10/26/2023 33  33 - 110 U/L Final    Total Protein 10/26/2023 7.2  6.4 - 8.2 g/dL Final    AST 10/26/2023 14  9 - 39 U/L Final    Bilirubin, Total 10/26/2023 0.8  0.0 - 1.2 mg/dL Final    ALT 10/26/2023 8  7 - 45 U/L Final    Patients treated with Sulfasalazine may generate falsely decreased results for ALT.    Clam IgE 10/26/2023 <0.10  <0.10 kU/L Final    Fish (Cod) IgE 10/26/2023 <0.10  <0.10 kU/L Final    [place], [place] IgE 10/26/2023 <0.10   Final    Egg White IgE 10/26/2023 <0.10  <0.10 kU/L Final    Cow's Milk IgE 10/26/2023 <0.10  <0.10 kU/L Final    Peanut IgE 10/26/2023 <0.10  <0.10 kU/L Final    Scallop IgE 10/26/2023 <0.10  <0.10 kU/L Final    Sesame Seed IgE 10/26/2023 <0.10  <0.10 kU/L Final    Shrimp IgE 10/26/2023 <0.10  <0.10 kU/L Final    Soybean IgE 10/26/2023 <0.10  <0.10 kU/L Final    Sacul IgE 10/26/2023 <0.10  <0.10 kU/L Final    Wheat IgE 10/26/2023 <0.10  <0.10 kU/L Final    Immunocap IgE 10/26/2023 18.7  <=214 KU/L Final    Note: Omalizumab (Xolair, Genegiddy; humanized  IgG1 antihuman IgE Fc) treatment does not  significantly interfere with the accuracy of  total IgE on the ImmunoCAP (Fresh !)  platform.  J Allergy Clin Immunol 2006;117:759-66).  Allergens, parasitic diseases, smoking, and  alcohol consumption have been reported to  increase levels of total IgE in serum.    [place] Grass IgE 10/26/2023 <0.10  <0.10 kU/L Final    [place] Grass IgE 10/26/2023 <0.10  <0.10 kU/L Final    [place] Grass, [place] Blue IgE 10/26/2023 0.61 (Low)  <0.10 kU/L Final    [place] Grass IgE 10/26/2023 0.57 (Low)  <0.10 kU/L Final    [place], [place] IgE 10/26/2023 <0.10  <0.10 kU/L Final    Common Pigweed IgE 10/26/2023 <0.10  <0.10 kU/L Final    Common Ragweed IgE 10/26/2023 <0.10  <0.10 kU/L Final    White [place] IgE 10/26/2023 <0.10  <0.10 kU/L Final    Common Silver Birch IgE 10/26/2023 <0.10  <0.10 kU/L Final    Box-Elder IgE 10/26/2023 <0.10  <0.10 kU/L Final    [place]ountain Juniper IgE 10/26/2023 <0.10  <0.10 Final    [place] IgE 10/26/2023 <0.10  <0.10 kU/L Final    Elm IgE 10/26/2023 <0.10  <0.10 kU/L Final    [place] IgE 10/26/2023 <0.10  <0.10 Final    Pecan, [place] IgE 10/26/2023 0.77 (Mod)  <0.10 Final    Maple [employer] [place], [place] * 10/26/2023 <0.10  <0.10 Final    [place] Tree IgE 10/26/2023 0.35 (Low)  <0.10 Final    Russian Thistle IgE 10/26/2023 <0.10  <0.10 kU/L Final    Sheep [place] IgE 10/26/2023 <0.10  <0.10 kU/L Final    Cat Dander IgE 10/26/2023 <0.10  <0.10 kU/L Final    Dog Dander IgE 10/26/2023 <0.10  <0.10 kU/L Final    Alternaria Alternata IgE 10/26/2023 <0.10  <0.10 kU/L Final    Cladosporium Herbarum IgE 10/26/2023 <0.10  <0.10 kU/L Final    English Plantain IgE 10/26/2023 <0.10  <0.10 Final    Dust Mite (D. farinae) IgE 10/26/2023 1.00 (Mod)  <0.10 kU/L Final    Dust Mite (D. pteronyssinus) IgE 10/26/2023 0.74 (Mod)  <0.10 kU/L Final    T[place]ik Cockroach IgE 10/26/2023 <0.10  <0.10 kU/L Final    Aspergillus Fumigatus IgE 10/26/2023 <0.10  <0.10 kU/L Final    [place] IgE 10/26/2023 <0.10  <0.10 kU/L Final    Penicillium Chrysogenum IgE 10/26/2023 <0.10  <0.10 kU/L Final   [facility]  Encounter on 02/13/2023   Component Date Value Ref Range Status    Color, Urine 02/13/2023 YELLOW  STRAW,YELLOW Final    Appearance, Urine 02/13/2023 CLEAR  CLEAR Final    Specific Gravity, Urine 02/13/2023 1.020  1.005 - 1.035 Final    pH, Urine 02/13/2023 5.0  5.0 - 8.0 Final    Protein, Urine 02/13/2023 NEGATIVE  NEGATIVE mg/dL Final    Glucose, Urine 02/13/2023 NEGATIVE  NEGATIVE mg/dL Final    Blood, Urine 02/13/2023 NEGATIVE  NEGATIVE Final    Ketones, Urine 02/13/2023 NEGATIVE  NEGATIVE mg/dL Final    Bilirubin, Urine 02/13/2023 NEGATIVE  NEGATIVE Final    Urobilinogen, Urine 02/13/2023 <2.0  0.0 - 1.9 mg/dL Final    Nitrite, Urine 02/13/2023 NEGATIVE  NEGATIVE Final    Leukocyte Esterase, Urine 02/13/2023 NEGATIVE  NEGATIVE Final    Glucose 02/13/2023 95  74 - 99 mg/dL Final    Sodium 02/13/2023 139  136 - 145 mmol/L Final    Potassium 02/13/2023 4.6  3.5 - 5.3 mmol/L Final    Chloride 02/13/2023 103  98 - 107 mmol/L Final    Bicarbonate 02/13/2023 28  21 - 32 mmol/L Final    Anion Gap 02/13/2023 13  10 - 20 mmol/L Final    Urea Nitrogen 02/13/2023 12  6 - 23 mg/dL Final    Creatinine 02/13/2023 0.85  0.50 - 1.05 mg/dL Final    GFR Female 02/13/2023 >90  >90 mL/min/1.73m2 Final    Comment:  CALCULATIONS OF ESTIMATED GFR ARE PERFORMED   USING THE 2021 CKD-EPI STUDY REFIT EQUATION   WITHOUT THE RACE VARIABLE FOR THE IDMS-TRACEABLE   CREATININE METHODS.    https://jasn.asnjournals.org/content/early/2021/09/22/ASN.4024116304      Calcium 02/13/2023 10.1  8.6 - 10.6 mg/dL Final    Albumin 02/13/2023 4.9  3.4 - 5.0 g/dL Final    Alkaline Phosphatase 02/13/2023 38  33 - 110 U/L Final    Total Protein 02/13/2023 7.4  6.4 - 8.2 g/dL Final    AST 02/13/2023 54 (H)  9 - 39 U/L Final    Total Bilirubin 02/13/2023 0.7  0.0 - 1.2 mg/dL Final    ALT (SGPT) 02/13/2023 17  7 - 45 U/L Final    Comment:  Patients treated with Sulfasalazine may generate    falsely decreased results for ALT.      WBC 02/13/2023 4.5  4.4 - 11.3  x10E9/L Final    nRBC 02/13/2023 0.0  0.0 - 0.0 /100 WBC Final    RBC 02/13/2023 4.81  4.00 - 5.20 x10E12/L Final    Hemoglobin 02/13/2023 14.4  12.0 - 16.0 g/dL Final    Hematocrit 02/13/2023 44.2  36.0 - 46.0 % Final    MCV 02/13/2023 92  80 - 100 fL Final    MCHC 02/13/2023 32.6  32.0 - 36.0 g/dL Final    Platelets 02/13/2023 271  150 - 450 x10E9/L Final    RDW 02/13/2023 12.1  11.5 - 14.5 % Final    Neutrophils % 02/13/2023 56.9  40.0 - 80.0 % Final    Immature Granulocytes %, Automated 02/13/2023 0.2  0.0 - 0.9 % Final    Comment:  Immature Granulocyte Count (IG) includes promyelocytes,    myelocytes and metamyelocytes but does not include bands.   Percent differential counts (%) should be interpreted in the   context of the absolute cell counts (cells/L).      Lymphocytes % 02/13/2023 32.8  13.0 - 44.0 % Final    Monocytes % 02/13/2023 6.5  2.0 - 10.0 % Final    Eosinophils % 02/13/2023 2.5  0.0 - 6.0 % Final    Basophils % 02/13/2023 1.1  0.0 - 2.0 % Final    Neutrophils Absolute 02/13/2023 2.55  1.20 - 7.70 x10E9/L Final    Lymphocytes Absolute 02/13/2023 1.47  1.20 - 4.80 x10E9/L Final    Monocytes Absolute 02/13/2023 0.29  0.10 - 1.00 x10E9/L Final    Eosinophils Absolute 02/13/2023 0.11  0.00 - 0.70 x10E9/L Final    Basophils Absolute 02/13/2023 0.05  0.00 - 0.10 x10E9/L Final    Phosphorus 02/13/2023 3.8  2.5 - 4.9 mg/dL Final    Comment:  The performance characteristics of phosphorus testing in   heparinized plasma have been validated by the individual     laboratory site where testing is performed. Testing    on heparinized plasma is not approved by the FDA;    however, such approval is not necessary.      Magnesium 02/13/2023 2.18  1.60 - 2.40 mg/dL Final   [facility] Encounter on 12/27/2022   Component Date Value Ref Range Status    Hepatitis C Ab 12/27/2022 NONREACTIVE  NONREACTIVE Final    Comment:  Results from patients taking biotin supplements or receiving   high-dose biotin therapy should be  interpreted with caution   due to possible interference with this test. Providers may    contact their local laboratory for further information.       Current Outpatient Medications on File Prior to Visit   Medication Sig Dispense Refill    albuterol 90 mcg/actuation inhaler Inhale 2 puffs every 4 hours if needed.       No current facility-administered medications on file prior to visit.     No images are attached to the encounter.            Assessment/Plan   Diagnoses and all orders for this visit:  Anorexia  Anxiety    Discussion with patient about continuing with the dietician weekly  2.   Patient to have CMP performed prior to next visit  3.   Patient to call for questions or concerns

## 2024-01-22 ENCOUNTER — LAB (OUTPATIENT)
Dept: LAB | Facility: LAB | Age: 24
End: 2024-01-22
Payer: COMMERCIAL

## 2024-01-22 DIAGNOSIS — F50.00 ANOREXIA NERVOSA (MULTI): ICD-10-CM

## 2024-01-22 LAB
ALBUMIN SERPL BCP-MCNC: 4.6 G/DL (ref 3.4–5)
ALP SERPL-CCNC: 38 U/L (ref 33–110)
ALT SERPL W P-5'-P-CCNC: 8 U/L (ref 7–45)
ANION GAP SERPL CALC-SCNC: 13 MMOL/L (ref 10–20)
AST SERPL W P-5'-P-CCNC: 14 U/L (ref 9–39)
BILIRUB SERPL-MCNC: 0.7 MG/DL (ref 0–1.2)
BUN SERPL-MCNC: 15 MG/DL (ref 6–23)
CALCIUM SERPL-MCNC: 9.8 MG/DL (ref 8.6–10.6)
CHLORIDE SERPL-SCNC: 105 MMOL/L (ref 98–107)
CO2 SERPL-SCNC: 27 MMOL/L (ref 21–32)
CREAT SERPL-MCNC: 0.82 MG/DL (ref 0.5–1.05)
EGFRCR SERPLBLD CKD-EPI 2021: >90 ML/MIN/1.73M*2
GLUCOSE SERPL-MCNC: 100 MG/DL (ref 74–99)
POTASSIUM SERPL-SCNC: 4.4 MMOL/L (ref 3.5–5.3)
PROT SERPL-MCNC: 7 G/DL (ref 6.4–8.2)
SODIUM SERPL-SCNC: 141 MMOL/L (ref 136–145)

## 2024-01-22 PROCEDURE — 36415 COLL VENOUS BLD VENIPUNCTURE: CPT

## 2024-01-22 PROCEDURE — 80053 COMPREHEN METABOLIC PANEL: CPT

## 2024-01-30 ENCOUNTER — OFFICE VISIT (OUTPATIENT)
Dept: PRIMARY CARE | Facility: CLINIC | Age: 24
End: 2024-01-30
Payer: COMMERCIAL

## 2024-01-30 VITALS
SYSTOLIC BLOOD PRESSURE: 117 MMHG | BODY MASS INDEX: 20.33 KG/M2 | WEIGHT: 122 LBS | TEMPERATURE: 97.2 F | HEART RATE: 90 BPM | DIASTOLIC BLOOD PRESSURE: 72 MMHG | OXYGEN SATURATION: 98 % | HEIGHT: 65 IN

## 2024-01-30 DIAGNOSIS — R63.0 ANOREXIA: Primary | ICD-10-CM

## 2024-01-30 DIAGNOSIS — F41.9 ANXIETY: ICD-10-CM

## 2024-01-30 PROCEDURE — 1036F TOBACCO NON-USER: CPT | Performed by: FAMILY MEDICINE

## 2024-01-30 PROCEDURE — 99214 OFFICE O/P EST MOD 30 MIN: CPT | Performed by: FAMILY MEDICINE

## 2024-01-30 RX ORDER — CHLORHEXIDINE GLUCONATE ORAL RINSE 1.2 MG/ML
15 SOLUTION DENTAL 2 TIMES DAILY
COMMUNITY
Start: 2024-01-27 | End: 2024-02-26

## 2024-01-30 RX ORDER — AMOXICILLIN 875 MG/1
875 TABLET, FILM COATED ORAL 2 TIMES DAILY
COMMUNITY
Start: 2024-01-26 | End: 2024-02-02

## 2024-01-30 ASSESSMENT — ENCOUNTER SYMPTOMS
COUGH: 0
APPETITE CHANGE: 0
DIZZINESS: 0
CHEST TIGHTNESS: 0
FATIGUE: 0
LIGHT-HEADEDNESS: 0
FEVER: 0
PALPITATIONS: 0
BACK PAIN: 0
COLOR CHANGE: 0
HEADACHES: 0
CHOKING: 0
FACIAL ASYMMETRY: 0
ACTIVITY CHANGE: 0
ARTHRALGIAS: 0

## 2024-01-30 NOTE — PROGRESS NOTES
"Subjective   Patient ID: Whit Chacko is a 23 y.o. female who presents for Follow-up.    HPI   Patient comes in that she is here to follow-up.    Stress this month has been better since started her job with the [employer]. She does like her job and is excited to be there. She is now works with one [employer] specifically. She is still seeing her counselor and dietician.     Review of Systems   Constitutional:  Negative for activity change, appetite change, fatigue and fever.   HENT:  Negative for congestion.    Respiratory:  Negative for cough, choking and chest tightness.    Cardiovascular:  Negative for chest pain, palpitations and leg swelling.   Musculoskeletal:  Negative for arthralgias, back pain and gait problem.   Skin:  Negative for color change and pallor.   Neurological:  Negative for dizziness, facial asymmetry, light-headedness and headaches.       Objective   /72   Pulse 90   Temp 36.2 °C (97.2 °F)   Ht 1.651 m (5' 5\")   Wt 55.3 kg (122 lb)   LMP  (LMP Unknown)   SpO2 98%   BMI 20.30 kg/m²   BSA Body surface area is 1.59 meters squared.      Physical Exam  Constitutional:       General: She is not in acute distress.     Appearance: Normal appearance. She is not toxic-appearing.   HENT:      Head: Normocephalic.      Right Ear: Tympanic membrane, ear canal and external ear normal.      Left Ear: Tympanic membrane, ear canal and external ear normal.   Eyes:      Conjunctiva/sclera: Conjunctivae normal.      Pupils: Pupils are equal, round, and reactive to light.   Cardiovascular:      Rate and Rhythm: Normal rate and regular rhythm.      Pulses: Normal pulses.      Heart sounds: Normal heart sounds.   Pulmonary:      Effort: No respiratory distress.      Breath sounds: No wheezing, rhonchi or rales.   Musculoskeletal:         General: No swelling or tenderness.      Cervical back: No tenderness.   Skin:     Findings: No lesion or rash.   Neurological:      General: No focal deficit " present.      Mental Status: She is alert and oriented to person, place, and time. Mental status is at baseline.      Gait: Gait normal.   Psychiatric:         Mood and Affect: Mood normal.         Behavior: Behavior normal.         Thought Content: Thought content normal.         Judgment: Judgment normal.       Lab on 01/22/2024   Component Date Value Ref Range Status    Glucose 01/22/2024 100 (H)  74 - 99 mg/dL Final    Sodium 01/22/2024 141  136 - 145 mmol/L Final    Potassium 01/22/2024 4.4  3.5 - 5.3 mmol/L Final    Chloride 01/22/2024 105  98 - 107 mmol/L Final    Bicarbonate 01/22/2024 27  21 - 32 mmol/L Final    Anion Gap 01/22/2024 13  10 - 20 mmol/L Final    Urea Nitrogen 01/22/2024 15  6 - 23 mg/dL Final    Creatinine 01/22/2024 0.82  0.50 - 1.05 mg/dL Final    eGFR 01/22/2024 >90  >60 mL/min/1.73m*2 Final    Calculations of estimated GFR are performed using the 2021 CKD-EPI Study Refit equation without the race variable for the IDMS-Traceable creatinine methods.  https://jasn.asnjournals.org/content/early/2021/09/22/ASN.6133037606    Calcium 01/22/2024 9.8  8.6 - 10.6 mg/dL Final    Albumin 01/22/2024 4.6  3.4 - 5.0 g/dL Final    Alkaline Phosphatase 01/22/2024 38  33 - 110 U/L Final    Total Protein 01/22/2024 7.0  6.4 - 8.2 g/dL Final    AST 01/22/2024 14  9 - 39 U/L Final    Bilirubin, Total 01/22/2024 0.7  0.0 - 1.2 mg/dL Final    ALT 01/22/2024 8  7 - 45 U/L Final    Patients treated with Sulfasalazine may generate falsely decreased results for ALT.   Lab on 10/26/2023   Component Date Value Ref Range Status    WBC 10/26/2023 6.7  4.4 - 11.3 x10*3/uL Final    nRBC 10/26/2023 0.0  0.0 - 0.0 /100 WBCs Final    RBC 10/26/2023 4.42  4.00 - 5.20 x10*6/uL Final    Hemoglobin 10/26/2023 13.6  12.0 - 16.0 g/dL Final    Hematocrit 10/26/2023 41.1  36.0 - 46.0 % Final    MCV 10/26/2023 93  80 - 100 fL Final    MCH 10/26/2023 30.8  26.0 - 34.0 pg Final    MCHC 10/26/2023 33.1  32.0 - 36.0 g/dL Final    RDW  10/26/2023 11.9  11.5 - 14.5 % Final    Platelets 10/26/2023 231  150 - 450 x10*3/uL Final    MPV 10/26/2023 9.7  7.5 - 11.5 fL Final    Neutrophils % 10/26/2023 49.8  40.0 - 80.0 % Final    Immature Granulocytes %, Automated 10/26/2023 0.1  0.0 - 0.9 % Final    Immature Granulocyte Count (IG) includes promyelocytes, myelocytes and metamyelocytes but does not include bands. Percent differential counts (%) should be interpreted in the context of the absolute cell counts (cells/UL).    Lymphocytes % 10/26/2023 41.6  13.0 - 44.0 % Final    Monocytes % 10/26/2023 6.7  2.0 - 10.0 % Final    Eosinophils % 10/26/2023 1.2  0.0 - 6.0 % Final    Basophils % 10/26/2023 0.6  0.0 - 2.0 % Final    Neutrophils Absolute 10/26/2023 3.33  1.20 - 7.70 x10*3/uL Final    Percent differential counts (%) should be interpreted in the context of the absolute cell counts (cells/uL).    Immature Granulocytes Absolute, Au* 10/26/2023 0.01  0.00 - 0.70 x10*3/uL Final    Lymphocytes Absolute 10/26/2023 2.78  1.20 - 4.80 x10*3/uL Final    Monocytes Absolute 10/26/2023 0.45  0.10 - 1.00 x10*3/uL Final    Eosinophils Absolute 10/26/2023 0.08  0.00 - 0.70 x10*3/uL Final    Basophils Absolute 10/26/2023 0.04  0.00 - 0.10 x10*3/uL Final    Glucose 10/26/2023 84  74 - 99 mg/dL Final    Sodium 10/26/2023 136  136 - 145 mmol/L Final    Potassium 10/26/2023 4.0  3.5 - 5.3 mmol/L Final    Chloride 10/26/2023 102  98 - 107 mmol/L Final    Bicarbonate 10/26/2023 30  21 - 32 mmol/L Final    Anion Gap 10/26/2023 8 (L)  10 - 20 mmol/L Final    Urea Nitrogen 10/26/2023 13  6 - 23 mg/dL Final    Creatinine 10/26/2023 0.74  0.50 - 1.05 mg/dL Final    eGFR 10/26/2023 >90  >60 mL/min/1.73m*2 Final    Calculations of estimated GFR are performed using the 2021 CKD-EPI Study Refit equation without the race variable for the IDMS-Traceable creatinine methods.  https://jasn.asnjournals.org/content/early/2021/09/22/ASN.4461365440    Calcium 10/26/2023 9.5  8.6 - 10.3 mg/dL  Final    Albumin 10/26/2023 5.0  3.4 - 5.0 g/dL Final    Alkaline Phosphatase 10/26/2023 33  33 - 110 U/L Final    Total Protein 10/26/2023 7.2  6.4 - 8.2 g/dL Final    AST 10/26/2023 14  9 - 39 U/L Final    Bilirubin, Total 10/26/2023 0.8  0.0 - 1.2 mg/dL Final    ALT 10/26/2023 8  7 - 45 U/L Final    Patients treated with Sulfasalazine may generate falsely decreased results for ALT.    Clam IgE 10/26/2023 <0.10  <0.10 kU/L Final    Fish (Cod) IgE 10/26/2023 <0.10  <0.10 kU/L Final    [place] IgE 10/26/2023 <0.10   Final    Egg White IgE 10/26/2023 <0.10  <0.10 kU/L Final    Cow's Milk IgE 10/26/2023 <0.10  <0.10 kU/L Final    Peanut IgE 10/26/2023 <0.10  <0.10 kU/L Final    Scallop IgE 10/26/2023 <0.10  <0.10 kU/L Final    Sesame Seed IgE 10/26/2023 <0.10  <0.10 kU/L Final    Shrimp IgE 10/26/2023 <0.10  <0.10 kU/L Final    Soybean IgE 10/26/2023 <0.10  <0.10 kU/L Final    Newborn IgE 10/26/2023 <0.10  <0.10 kU/L Final    Wheat IgE 10/26/2023 <0.10  <0.10 kU/L Final    Immunocap IgE 10/26/2023 18.7  <=214 KU/L Final    Note: Omalizumab (Xolair, GenentOneSeed Expeditions; humanized  IgG1 antihuman IgE Fc) treatment does not  significantly interfere with the accuracy of  total IgE on the ImmunoCAP (Shook) platform.  J Allergy Clin Immunol 2006;117:759-66).  Allergens, parasitic diseases, smoking, and  alcohol consumption have been reported to  increase levels of total IgE in serum.    [place] Grass IgE 10/26/2023 <0.10  <0.10 kU/L Final    [place] Grass IgE 10/26/2023 <0.10  <0.10 kU/L Final    [place] Grass, [place] IgE 10/26/2023 0.61 (Low)  <0.10 kU/L Final    [place] Grass IgE 10/26/2023 0.57 (Low)  <0.10 kU/L Final    [place], [place] IgE 10/26/2023 <0.10  <0.10 kU/L Final    Common Pigweed IgE 10/26/2023 <0.10  <0.10 kU/L Final    Common Ragweed IgE 10/26/2023 <0.10  <0.10 kU/L Final    White [place]wayne IgE 10/26/2023 <0.10  <0.10 kU/L Final    Common Silver Birch IgE 10/26/2023 <0.10  <0.10 kU/L Final     Box-Elder IgE 10/26/2023 <0.10  <0.10 kU/L Final    [place] IgE 10/26/2023 <0.10  <0.10 Final    [place] IgE 10/26/2023 <0.10  <0.10 kU/L Final    Elm IgE 10/26/2023 <0.10  <0.10 kU/L Final    [place] IgE 10/26/2023 <0.10  <0.10 Final    [place], [place] IgE 10/26/2023 0.77 (Mod)  <0.10 Final    [place] * 10/26/2023 <0.10  <0.10 Final    [place] Tree IgE 10/26/2023 0.35 (Low)  <0.10 Final    Russian Thistle IgE 10/26/2023 <0.10  <0.10 kU/L Final    Sheep [place] IgE 10/26/2023 <0.10  <0.10 kU/L Final    Cat Dander IgE 10/26/2023 <0.10  <0.10 kU/L Final    Dog Dander IgE 10/26/2023 <0.10  <0.10 kU/L Final    Alternaria Alternata IgE 10/26/2023 <0.10  <0.10 kU/L Final    Cladosporium Herbarum IgE 10/26/2023 <0.10  <0.10 kU/L Final    English Plantain IgE 10/26/2023 <0.10  <0.10 Final    Dust Mite (D. farinae) IgE 10/26/2023 1.00 (Mod)  <0.10 kU/L Final    Dust Mite (D. pteronyssinus) IgE 10/26/2023 0.74 (Mod)  <0.10 kU/L Final    [place] Cockroach IgE 10/26/2023 <0.10  <0.10 kU/L Final    Aspergillus Fumigatus IgE 10/26/2023 <0.10  <0.10 kU/L Final    [place] IgE 10/26/2023 <0.10  <0.10 kU/L Final    Penicillium Chrysogenum IgE 10/26/2023 <0.10  <0.10 kU/L Final   [facility] Encounter on 02/13/2023   Component Date Value Ref Range Status    Color, Urine 02/13/2023 YELLOW  STRAW,YELLOW Final    Appearance, Urine 02/13/2023 CLEAR  CLEAR Final    Specific Gravity, Urine 02/13/2023 1.020  1.005 - 1.035 Final    pH, Urine 02/13/2023 5.0  5.0 - 8.0 Final    Protein, Urine 02/13/2023 NEGATIVE  NEGATIVE mg/dL Final    Glucose, Urine 02/13/2023 NEGATIVE  NEGATIVE mg/dL Final    Blood, Urine 02/13/2023 NEGATIVE  NEGATIVE Final    Ketones, Urine 02/13/2023 NEGATIVE  NEGATIVE mg/dL Final    Bilirubin, Urine 02/13/2023 NEGATIVE  NEGATIVE Final    Urobilinogen, Urine 02/13/2023 <2.0  0.0 - 1.9 mg/dL Final    Nitrite, Urine 02/13/2023 NEGATIVE  NEGATIVE Final    Leukocyte Esterase, Urine 02/13/2023 NEGATIVE   NEGATIVE Final    Glucose 02/13/2023 95  74 - 99 mg/dL Final    Sodium 02/13/2023 139  136 - 145 mmol/L Final    Potassium 02/13/2023 4.6  3.5 - 5.3 mmol/L Final    Chloride 02/13/2023 103  98 - 107 mmol/L Final    Bicarbonate 02/13/2023 28  21 - 32 mmol/L Final    Anion Gap 02/13/2023 13  10 - 20 mmol/L Final    Urea Nitrogen 02/13/2023 12  6 - 23 mg/dL Final    Creatinine 02/13/2023 0.85  0.50 - 1.05 mg/dL Final    GFR Female 02/13/2023 >90  >90 mL/min/1.73m2 Final    Comment:  CALCULATIONS OF ESTIMATED GFR ARE PERFORMED   USING THE 2021 CKD-EPI STUDY REFIT EQUATION   WITHOUT THE RACE VARIABLE FOR THE IDMS-TRACEABLE   CREATININE METHODS.    https://jasn.asnjournals.org/content/early/2021/09/22/ASN.3457815901      Calcium 02/13/2023 10.1  8.6 - 10.6 mg/dL Final    Albumin 02/13/2023 4.9  3.4 - 5.0 g/dL Final    Alkaline Phosphatase 02/13/2023 38  33 - 110 U/L Final    Total Protein 02/13/2023 7.4  6.4 - 8.2 g/dL Final    AST 02/13/2023 54 (H)  9 - 39 U/L Final    Total Bilirubin 02/13/2023 0.7  0.0 - 1.2 mg/dL Final    ALT (SGPT) 02/13/2023 17  7 - 45 U/L Final    Comment:  Patients treated with Sulfasalazine may generate    falsely decreased results for ALT.      WBC 02/13/2023 4.5  4.4 - 11.3 x10E9/L Final    nRBC 02/13/2023 0.0  0.0 - 0.0 /100 WBC Final    RBC 02/13/2023 4.81  4.00 - 5.20 x10E12/L Final    Hemoglobin 02/13/2023 14.4  12.0 - 16.0 g/dL Final    Hematocrit 02/13/2023 44.2  36.0 - 46.0 % Final    MCV 02/13/2023 92  80 - 100 fL Final    MCHC 02/13/2023 32.6  32.0 - 36.0 g/dL Final    Platelets 02/13/2023 271  150 - 450 x10E9/L Final    RDW 02/13/2023 12.1  11.5 - 14.5 % Final    Neutrophils % 02/13/2023 56.9  40.0 - 80.0 % Final    Immature Granulocytes %, Automated 02/13/2023 0.2  0.0 - 0.9 % Final    Comment:  Immature Granulocyte Count (IG) includes promyelocytes,    myelocytes and metamyelocytes but does not include bands.   Percent differential counts (%) should be interpreted in the   context  of the absolute cell counts (cells/L).      Lymphocytes % 02/13/2023 32.8  13.0 - 44.0 % Final    Monocytes % 02/13/2023 6.5  2.0 - 10.0 % Final    Eosinophils % 02/13/2023 2.5  0.0 - 6.0 % Final    Basophils % 02/13/2023 1.1  0.0 - 2.0 % Final    Neutrophils Absolute 02/13/2023 2.55  1.20 - 7.70 x10E9/L Final    Lymphocytes Absolute 02/13/2023 1.47  1.20 - 4.80 x10E9/L Final    Monocytes Absolute 02/13/2023 0.29  0.10 - 1.00 x10E9/L Final    Eosinophils Absolute 02/13/2023 0.11  0.00 - 0.70 x10E9/L Final    Basophils Absolute 02/13/2023 0.05  0.00 - 0.10 x10E9/L Final    Phosphorus 02/13/2023 3.8  2.5 - 4.9 mg/dL Final    Comment:  The performance characteristics of phosphorus testing in   heparinized plasma have been validated by the individual     laboratory site where testing is performed. Testing    on heparinized plasma is not approved by the FDA;    however, such approval is not necessary.      Magnesium 02/13/2023 2.18  1.60 - 2.40 mg/dL Final     Current Outpatient Medications on File Prior to Visit   Medication Sig Dispense Refill    albuterol 90 mcg/actuation inhaler Inhale 2 puffs every 4 hours if needed.      amoxicillin (Amoxil) 875 mg tablet Take 1 tablet (875 mg) by mouth 2 times a day.      chlorhexidine (Peridex) 0.12 % solution Take 15 mL by mouth twice a day.       No current facility-administered medications on file prior to visit.     No images are attached to the encounter.            Assessment/Plan   Diagnoses and all orders for this visit:  Anorexia  Anxiety      Discussion with patient about continuing with the dietician weekly  2.   Patient to call for questions or concerns

## 2024-02-06 ENCOUNTER — APPOINTMENT (OUTPATIENT)
Dept: PRIMARY CARE | Facility: CLINIC | Age: 24
End: 2024-02-06
Payer: COMMERCIAL

## 2024-02-13 ENCOUNTER — APPOINTMENT (OUTPATIENT)
Dept: PRIMARY CARE | Facility: CLINIC | Age: 24
End: 2024-02-13
Payer: COMMERCIAL

## 2024-03-26 ENCOUNTER — APPOINTMENT (OUTPATIENT)
Dept: PRIMARY CARE | Facility: CLINIC | Age: 24
End: 2024-03-26
Payer: COMMERCIAL

## 2024-04-02 ENCOUNTER — OFFICE VISIT (OUTPATIENT)
Dept: PRIMARY CARE | Facility: CLINIC | Age: 24
End: 2024-04-02
Payer: COMMERCIAL

## 2024-04-02 VITALS
WEIGHT: 126 LBS | BODY MASS INDEX: 20.97 KG/M2 | SYSTOLIC BLOOD PRESSURE: 100 MMHG | DIASTOLIC BLOOD PRESSURE: 68 MMHG | HEART RATE: 78 BPM

## 2024-04-02 DIAGNOSIS — F32.A DEPRESSION, UNSPECIFIED DEPRESSION TYPE: ICD-10-CM

## 2024-04-02 DIAGNOSIS — R53.83 OTHER FATIGUE: ICD-10-CM

## 2024-04-02 DIAGNOSIS — F41.9 ANXIETY: ICD-10-CM

## 2024-04-02 DIAGNOSIS — R63.0 ANOREXIA: Primary | ICD-10-CM

## 2024-04-02 DIAGNOSIS — Z00.00 HEALTHCARE MAINTENANCE: ICD-10-CM

## 2024-04-02 PROCEDURE — 99214 OFFICE O/P EST MOD 30 MIN: CPT | Performed by: FAMILY MEDICINE

## 2024-04-02 PROCEDURE — 1036F TOBACCO NON-USER: CPT | Performed by: FAMILY MEDICINE

## 2024-04-02 ASSESSMENT — ENCOUNTER SYMPTOMS
CHEST TIGHTNESS: 0
ARTHRALGIAS: 0
HEADACHES: 0
BACK PAIN: 0
APPETITE CHANGE: 0
COLOR CHANGE: 0
FEVER: 0
PALPITATIONS: 0
ACTIVITY CHANGE: 0
FACIAL ASYMMETRY: 0
FATIGUE: 0
COUGH: 0
CHOKING: 0
LIGHT-HEADEDNESS: 0
DIZZINESS: 0

## 2024-04-02 NOTE — PROGRESS NOTES
Subjective   Patient ID: Whit Chacko is a 24 y.o. female who presents for No chief complaint on file..    HPI   Patient comes in that she is here to follow-up.    Stress with her job with the [employer] is minimal. A month ago she was a bit more stressed out. She does like her job and is excited to be there. She is now works with one doctor specifically. She is still seeing her counselor and dietician.     Review of Systems   Constitutional:  Negative for activity change, appetite change, fatigue and fever.   HENT:  Negative for congestion.    Respiratory:  Negative for cough, choking and chest tightness.    Cardiovascular:  Negative for chest pain, palpitations and leg swelling.   Musculoskeletal:  Negative for arthralgias, back pain and gait problem.   Skin:  Negative for color change and pallor.   Neurological:  Negative for dizziness, facial asymmetry, light-headedness and headaches.       Objective   There were no vitals taken for this visit.  BSA There is no height or weight on file to calculate BSA.      Physical Exam  Constitutional:       General: She is not in acute distress.     Appearance: Normal appearance. She is not toxic-appearing.   HENT:      Head: Normocephalic.      Right Ear: Tympanic membrane, ear canal and external ear normal.      Left Ear: Tympanic membrane, ear canal and external ear normal.   Eyes:      Conjunctiva/sclera: Conjunctivae normal.      Pupils: Pupils are equal, round, and reactive to light.   Cardiovascular:      Rate and Rhythm: Normal rate and regular rhythm.      Pulses: Normal pulses.      Heart sounds: Normal heart sounds.   Pulmonary:      Effort: No respiratory distress.      Breath sounds: No wheezing, rhonchi or rales.   Musculoskeletal:         General: No swelling or tenderness.      Cervical back: No tenderness.   Skin:     Findings: No lesion or rash.   Neurological:      General: No focal deficit present.      Mental Status: She is alert and oriented to  person, place, and time. Mental status is at baseline.      Gait: Gait normal.   Psychiatric:         Mood and Affect: Mood normal.         Behavior: Behavior normal.         Thought Content: Thought content normal.         Judgment: Judgment normal.       Lab on 01/22/2024   Component Date Value Ref Range Status    Glucose 01/22/2024 100 (H)  74 - 99 mg/dL Final    Sodium 01/22/2024 141  136 - 145 mmol/L Final    Potassium 01/22/2024 4.4  3.5 - 5.3 mmol/L Final    Chloride 01/22/2024 105  98 - 107 mmol/L Final    Bicarbonate 01/22/2024 27  21 - 32 mmol/L Final    Anion Gap 01/22/2024 13  10 - 20 mmol/L Final    Urea Nitrogen 01/22/2024 15  6 - 23 mg/dL Final    Creatinine 01/22/2024 0.82  0.50 - 1.05 mg/dL Final    eGFR 01/22/2024 >90  >60 mL/min/1.73m*2 Final    Calculations of estimated GFR are performed using the 2021 CKD-EPI Study Refit equation without the race variable for the IDMS-Traceable creatinine methods.  https://jasn.asnjournals.org/content/early/2021/09/22/ASN.0363606087    Calcium 01/22/2024 9.8  8.6 - 10.6 mg/dL Final    Albumin 01/22/2024 4.6  3.4 - 5.0 g/dL Final    Alkaline Phosphatase 01/22/2024 38  33 - 110 U/L Final    Total Protein 01/22/2024 7.0  6.4 - 8.2 g/dL Final    AST 01/22/2024 14  9 - 39 U/L Final    Bilirubin, Total 01/22/2024 0.7  0.0 - 1.2 mg/dL Final    ALT 01/22/2024 8  7 - 45 U/L Final    Patients treated with Sulfasalazine may generate falsely decreased results for ALT.   Lab on 10/26/2023   Component Date Value Ref Range Status    WBC 10/26/2023 6.7  4.4 - 11.3 x10*3/uL Final    nRBC 10/26/2023 0.0  0.0 - 0.0 /100 WBCs Final    RBC 10/26/2023 4.42  4.00 - 5.20 x10*6/uL Final    Hemoglobin 10/26/2023 13.6  12.0 - 16.0 g/dL Final    Hematocrit 10/26/2023 41.1  36.0 - 46.0 % Final    MCV 10/26/2023 93  80 - 100 fL Final    MCH 10/26/2023 30.8  26.0 - 34.0 pg Final    MCHC 10/26/2023 33.1  32.0 - 36.0 g/dL Final    RDW 10/26/2023 11.9  11.5 - 14.5 % Final    Platelets 10/26/2023  231  150 - 450 x10*3/uL Final    MPV 10/26/2023 9.7  7.5 - 11.5 fL Final    Neutrophils % 10/26/2023 49.8  40.0 - 80.0 % Final    Immature Granulocytes %, Automated 10/26/2023 0.1  0.0 - 0.9 % Final    Immature Granulocyte Count (IG) includes promyelocytes, myelocytes and metamyelocytes but does not include bands. Percent differential counts (%) should be interpreted in the context of the absolute cell counts (cells/UL).    Lymphocytes % 10/26/2023 41.6  13.0 - 44.0 % Final    Monocytes % 10/26/2023 6.7  2.0 - 10.0 % Final    Eosinophils % 10/26/2023 1.2  0.0 - 6.0 % Final    Basophils % 10/26/2023 0.6  0.0 - 2.0 % Final    Neutrophils Absolute 10/26/2023 3.33  1.20 - 7.70 x10*3/uL Final    Percent differential counts (%) should be interpreted in the context of the absolute cell counts (cells/uL).    Immature Granulocytes Absolute, Au* 10/26/2023 0.01  0.00 - 0.70 x10*3/uL Final    Lymphocytes Absolute 10/26/2023 2.78  1.20 - 4.80 x10*3/uL Final    Monocytes Absolute 10/26/2023 0.45  0.10 - 1.00 x10*3/uL Final    Eosinophils Absolute 10/26/2023 0.08  0.00 - 0.70 x10*3/uL Final    Basophils Absolute 10/26/2023 0.04  0.00 - 0.10 x10*3/uL Final    Glucose 10/26/2023 84  74 - 99 mg/dL Final    Sodium 10/26/2023 136  136 - 145 mmol/L Final    Potassium 10/26/2023 4.0  3.5 - 5.3 mmol/L Final    Chloride 10/26/2023 102  98 - 107 mmol/L Final    Bicarbonate 10/26/2023 30  21 - 32 mmol/L Final    Anion Gap 10/26/2023 8 (L)  10 - 20 mmol/L Final    Urea Nitrogen 10/26/2023 13  6 - 23 mg/dL Final    Creatinine 10/26/2023 0.74  0.50 - 1.05 mg/dL Final    eGFR 10/26/2023 >90  >60 mL/min/1.73m*2 Final    Calculations of estimated GFR are performed using the 2021 CKD-EPI Study Refit equation without the race variable for the IDMS-Traceable creatinine methods.  https://jasn.asnjournals.org/content/early/2021/09/22/ASN.7454489133    Calcium 10/26/2023 9.5  8.6 - 10.3 mg/dL Final    Albumin 10/26/2023 5.0  3.4 - 5.0 g/dL Final     Alkaline Phosphatase 10/26/2023 33  33 - 110 U/L Final    Total Protein 10/26/2023 7.2  6.4 - 8.2 g/dL Final    AST 10/26/2023 14  9 - 39 U/L Final    Bilirubin, Total 10/26/2023 0.8  0.0 - 1.2 mg/dL Final    ALT 10/26/2023 8  7 - 45 U/L Final    Patients treated with Sulfasalazine may generate falsely decreased results for ALT.    Clam IgE 10/26/2023 <0.10  <0.10 kU/L Final    Fish (Cod) IgE 10/26/2023 <0.10  <0.10 kU/L Final    [place], [place] IgE 10/26/2023 <0.10   Final    Egg White IgE 10/26/2023 <0.10  <0.10 kU/L Final    Cow's Milk IgE 10/26/2023 <0.10  <0.10 kU/L Final    Peanut IgE 10/26/2023 <0.10  <0.10 kU/L Final    Scallop IgE 10/26/2023 <0.10  <0.10 kU/L Final    Sesame Seed IgE 10/26/2023 <0.10  <0.10 kU/L Final    Shrimp IgE 10/26/2023 <0.10  <0.10 kU/L Final    Soybean IgE 10/26/2023 <0.10  <0.10 kU/L Final    [place] IgE 10/26/2023 <0.10  <0.10 kU/L Final    Wheat IgE 10/26/2023 <0.10  <0.10 kU/L Final    Immunocap IgE 10/26/2023 18.7  <=214 KU/L Final    Note: Omalizumab (Xolair, GenentSeed&Spark; humanized  IgG1 antihuman IgE Fc) treatment does not  significantly interfere with the accuracy of  total IgE on the ImmunoCAP (Astech) platform.  J Allergy Clin Immunol 2006;117:759-66).  Allergens, parasitic diseases, smoking, and  alcohol consumption have been reported to  increase levels of total IgE in serum.    [place] Grass IgE 10/26/2023 <0.10  <0.10 kU/L Final    [place] Grass IgE 10/26/2023 <0.10  <0.10 kU/L Final    [place], [place] IgE 10/26/2023 0.61 (Low)  <0.10 kU/L Final    [place] IgE 10/26/2023 0.57 (Low)  <0.10 kU/L Final    [place], [place] IgE 10/26/2023 <0.10  <0.10 kU/L Final    Common Pigweed IgE 10/26/2023 <0.10  <0.10 kU/L Final    Common Ragweed IgE 10/26/2023 <0.10  <0.10 kU/L Final    [place] IgE 10/26/2023 <0.10  <0.10 kU/L Final    Common Silver Birch IgE 10/26/2023 <0.10  <0.10 kU/L Final    Box-Elder IgE 10/26/2023 <0.10  <0.10 kU/L Final    [place] IgE 10/26/2023 <0.10  <0.10 Final    [place] IgE 10/26/2023 <0.10  <0.10 kU/L Final    [place] IgE 10/26/2023 <0.10  <0.10 kU/L Final    [place] IgE 10/26/2023 <0.10  <0.10 Final    [place], [place] IgE 10/26/2023 0.77 (Mod)  <0.10 Final    [place] * 10/26/2023 <0.10  <0.10 Final    [place] Tree IgE 10/26/2023 0.35 (Low)  <0.10 Final    Russian Thistle IgE 10/26/2023 <0.10  <0.10 kU/L Final    [place]ep [place] IgE 10/26/2023 <0.10  <0.10 kU/L Final    Cat Dander IgE 10/26/2023 <0.10  <0.10 kU/L Final    Dog Dander IgE 10/26/2023 <0.10  <0.10 kU/L Final    Alternaria Alternata IgE 10/26/2023 <0.10  <0.10 kU/L Final    Cladosporium Herbarum IgE 10/26/2023 <0.10  <0.10 kU/L Final    English Plantain IgE 10/26/2023 <0.10  <0.10 Final    Dust Mite (D. farinae) IgE 10/26/2023 1.00 (Mod)  <0.10 kU/L Final    Dust Mite (D. pteronyssinus) IgE 10/26/2023 0.74 (Mod)  <0.10 kU/L Final    [place] Cockroach IgE 10/26/2023 <0.10  <0.10 kU/L Final    Aspergillus Fumigatus IgE 10/26/2023 <0.10  <0.10 kU/L Final    [place] IgE 10/26/2023 <0.10  <0.10 kU/L Final    Penicillium Chrysogenum IgE 10/26/2023 <0.10  <0.10 kU/L Final     Current Outpatient Medications on File Prior to Visit   Medication Sig Dispense Refill    albuterol 90 mcg/actuation inhaler Inhale 2 puffs every 4 hours if needed.       No current facility-administered medications on file prior to visit.     No images are attached to the encounter.            Assessment/Plan   Diagnoses and all orders for this visit:  Anorexia  Anxiety      Discussion with patient about continuing with the dietician weekly  2.   Patient to call for questions or concerns

## 2024-04-25 DIAGNOSIS — R53.83 FATIGUE, UNSPECIFIED TYPE: ICD-10-CM

## 2024-05-20 ENCOUNTER — LAB (OUTPATIENT)
Dept: LAB | Facility: LAB | Age: 24
End: 2024-05-20
Payer: COMMERCIAL

## 2024-05-20 DIAGNOSIS — R53.83 FATIGUE, UNSPECIFIED TYPE: ICD-10-CM

## 2024-05-20 DIAGNOSIS — R53.83 OTHER FATIGUE: ICD-10-CM

## 2024-05-20 DIAGNOSIS — Z00.00 HEALTHCARE MAINTENANCE: ICD-10-CM

## 2024-05-20 PROCEDURE — 83550 IRON BINDING TEST: CPT

## 2024-05-20 PROCEDURE — 82306 VITAMIN D 25 HYDROXY: CPT

## 2024-05-20 PROCEDURE — 85025 COMPLETE CBC W/AUTO DIFF WBC: CPT

## 2024-05-20 PROCEDURE — 82728 ASSAY OF FERRITIN: CPT

## 2024-05-20 PROCEDURE — 83540 ASSAY OF IRON: CPT

## 2024-05-20 PROCEDURE — 80053 COMPREHEN METABOLIC PANEL: CPT

## 2024-05-20 PROCEDURE — 36415 COLL VENOUS BLD VENIPUNCTURE: CPT

## 2024-05-20 PROCEDURE — 80061 LIPID PANEL: CPT

## 2024-05-20 PROCEDURE — 84443 ASSAY THYROID STIM HORMONE: CPT

## 2024-05-21 LAB
25(OH)D3 SERPL-MCNC: 31 NG/ML (ref 30–100)
ALBUMIN SERPL BCP-MCNC: 4.8 G/DL (ref 3.4–5)
ALP SERPL-CCNC: 33 U/L (ref 33–110)
ALT SERPL W P-5'-P-CCNC: 7 U/L (ref 7–45)
ANION GAP SERPL CALC-SCNC: 15 MMOL/L (ref 10–20)
AST SERPL W P-5'-P-CCNC: 13 U/L (ref 9–39)
BASOPHILS # BLD AUTO: 0.03 X10*3/UL (ref 0–0.1)
BASOPHILS NFR BLD AUTO: 0.5 %
BILIRUB SERPL-MCNC: 0.6 MG/DL (ref 0–1.2)
BUN SERPL-MCNC: 12 MG/DL (ref 6–23)
CALCIUM SERPL-MCNC: 9.5 MG/DL (ref 8.6–10.6)
CHLORIDE SERPL-SCNC: 103 MMOL/L (ref 98–107)
CHOLEST SERPL-MCNC: 151 MG/DL (ref 0–199)
CHOLESTEROL/HDL RATIO: 2.6
CO2 SERPL-SCNC: 25 MMOL/L (ref 21–32)
CREAT SERPL-MCNC: 0.72 MG/DL (ref 0.5–1.05)
EGFRCR SERPLBLD CKD-EPI 2021: >90 ML/MIN/1.73M*2
EOSINOPHIL # BLD AUTO: 0.04 X10*3/UL (ref 0–0.7)
EOSINOPHIL NFR BLD AUTO: 0.7 %
ERYTHROCYTE [DISTWIDTH] IN BLOOD BY AUTOMATED COUNT: 11.9 % (ref 11.5–14.5)
FERRITIN SERPL-MCNC: 37 NG/ML (ref 8–150)
GLUCOSE SERPL-MCNC: 91 MG/DL (ref 74–99)
HCT VFR BLD AUTO: 39.6 % (ref 36–46)
HDLC SERPL-MCNC: 57.1 MG/DL
HGB BLD-MCNC: 13.2 G/DL (ref 12–16)
IMM GRANULOCYTES # BLD AUTO: 0.01 X10*3/UL (ref 0–0.7)
IMM GRANULOCYTES NFR BLD AUTO: 0.2 % (ref 0–0.9)
IRON SATN MFR SERPL: 22 % (ref 25–45)
IRON SERPL-MCNC: 76 UG/DL (ref 35–150)
LDLC SERPL CALC-MCNC: 89 MG/DL
LYMPHOCYTES # BLD AUTO: 2.04 X10*3/UL (ref 1.2–4.8)
LYMPHOCYTES NFR BLD AUTO: 34.3 %
MCH RBC QN AUTO: 30.4 PG (ref 26–34)
MCHC RBC AUTO-ENTMCNC: 33.3 G/DL (ref 32–36)
MCV RBC AUTO: 91 FL (ref 80–100)
MONOCYTES # BLD AUTO: 0.34 X10*3/UL (ref 0.1–1)
MONOCYTES NFR BLD AUTO: 5.7 %
NEUTROPHILS # BLD AUTO: 3.48 X10*3/UL (ref 1.2–7.7)
NEUTROPHILS NFR BLD AUTO: 58.6 %
NON HDL CHOLESTEROL: 94 MG/DL (ref 0–149)
NRBC BLD-RTO: 0 /100 WBCS (ref 0–0)
PLATELET # BLD AUTO: 248 X10*3/UL (ref 150–450)
POTASSIUM SERPL-SCNC: 4.4 MMOL/L (ref 3.5–5.3)
PROT SERPL-MCNC: 6.9 G/DL (ref 6.4–8.2)
RBC # BLD AUTO: 4.34 X10*6/UL (ref 4–5.2)
SODIUM SERPL-SCNC: 139 MMOL/L (ref 136–145)
TIBC SERPL-MCNC: 346 UG/DL (ref 240–445)
TRIGL SERPL-MCNC: 27 MG/DL (ref 0–149)
TSH SERPL-ACNC: 0.56 MIU/L (ref 0.44–3.98)
UIBC SERPL-MCNC: 270 UG/DL (ref 110–370)
VLDL: 5 MG/DL (ref 0–40)
WBC # BLD AUTO: 5.9 X10*3/UL (ref 4.4–11.3)

## 2024-06-03 NOTE — PROGRESS NOTES
"Subjective   Patient ID: Whit Chacko is a 24 y.o. female who presents for physical exam.     HPI   Patient presents for physical exam.  He was at the [facility] February through May.  She was then sent to [facility].  Was diagnosed with anorexia September 2021 was seeing a therapist weekly and dietitian for through the month of October 2022.    Currently patient is still having thoughts about restriction and body image.  She is a self-proclaimed perfectionist.  She then saw another dietitian that was very unhelpful.  She also started her therapist again in April 2022 weekly.  This is Geena Rios through the [facility].  Apparently in December 2022 she started food aversion again and did this for 3 weeks.  She was only eating a small snack daily for 3 weeks.  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  She started his new job March 2023.      Fam Hx  Mom side heart issues  Mom ()  Dad ()  Sister  Sister Bipolar  Brother  Brother    Exercise walks, plays tennis  ETOH denies  Caffeine denies  Tobacco denies    Mammogram @ 40  DEXA @ 65  Colonoscopy @ 45    Patient denies other complaints.    Review of Systems  Denies any other complaints at this time.     Objective   /66 (BP Location: Left arm)   Pulse 70   Ht 1.651 m (5' 5\")   Wt 56.8 kg (125 lb 3.2 oz)   BMI 20.83 kg/m²   BSA Body surface area is 1.61 meters squared.      Physical Exam  General alert no distress  HEENT EOMI PERRLA  Heart no murmurs rubs gallops appreciated  Lung no wheezes no rales no rhonchi appreciated  Abdomen soft tender no rebound rigidity or guarding appreciated  Chest no masses, breast no masses  Internal exam: closed cervical os, no lesions appreciated  Lab on 05/20/2024   Component Date Value Ref Range Status    WBC 05/20/2024 5.9  4.4 - 11.3 x10*3/uL Final    nRBC 05/20/2024 0.0  0.0 - 0.0 /100 WBCs " Final    RBC 05/20/2024 4.34  4.00 - 5.20 x10*6/uL Final    Hemoglobin 05/20/2024 13.2  12.0 - 16.0 g/dL Final    Hematocrit 05/20/2024 39.6  36.0 - 46.0 % Final    MCV 05/20/2024 91  80 - 100 fL Final    MCH 05/20/2024 30.4  26.0 - 34.0 pg Final    MCHC 05/20/2024 33.3  32.0 - 36.0 g/dL Final    RDW 05/20/2024 11.9  11.5 - 14.5 % Final    Platelets 05/20/2024 248  150 - 450 x10*3/uL Final    Neutrophils % 05/20/2024 58.6  40.0 - 80.0 % Final    Immature Granulocytes %, Automated 05/20/2024 0.2  0.0 - 0.9 % Final    Immature Granulocyte Count (IG) includes promyelocytes, myelocytes and metamyelocytes but does not include bands. Percent differential counts (%) should be interpreted in the context of the absolute cell counts (cells/UL).    Lymphocytes % 05/20/2024 34.3  13.0 - 44.0 % Final    Monocytes % 05/20/2024 5.7  2.0 - 10.0 % Final    Eosinophils % 05/20/2024 0.7  0.0 - 6.0 % Final    Basophils % 05/20/2024 0.5  0.0 - 2.0 % Final    Neutrophils Absolute 05/20/2024 3.48  1.20 - 7.70 x10*3/uL Final    Percent differential counts (%) should be interpreted in the context of the absolute cell counts (cells/uL).    Immature Granulocytes Absolute, Au* 05/20/2024 0.01  0.00 - 0.70 x10*3/uL Final    Lymphocytes Absolute 05/20/2024 2.04  1.20 - 4.80 x10*3/uL Final    Monocytes Absolute 05/20/2024 0.34  0.10 - 1.00 x10*3/uL Final    Eosinophils Absolute 05/20/2024 0.04  0.00 - 0.70 x10*3/uL Final    Basophils Absolute 05/20/2024 0.03  0.00 - 0.10 x10*3/uL Final    Glucose 05/20/2024 91  74 - 99 mg/dL Final    Sodium 05/20/2024 139  136 - 145 mmol/L Final    Potassium 05/20/2024 4.4  3.5 - 5.3 mmol/L Final    Chloride 05/20/2024 103  98 - 107 mmol/L Final    Bicarbonate 05/20/2024 25  21 - 32 mmol/L Final    Anion Gap 05/20/2024 15  10 - 20 mmol/L Final    Urea Nitrogen 05/20/2024 12  6 - 23 mg/dL Final    Creatinine 05/20/2024 0.72  0.50 - 1.05 mg/dL Final    eGFR 05/20/2024 >90  >60 mL/min/1.73m*2 Final    Calculations  of estimated GFR are performed using the 2021 CKD-EPI Study Refit equation without the race variable for the IDMS-Traceable creatinine methods.  https://jasn.asnjournals.org/content/early/2021/09/22/ASN.1735594895    Calcium 05/20/2024 9.5  8.6 - 10.6 mg/dL Final    Albumin 05/20/2024 4.8  3.4 - 5.0 g/dL Final    Alkaline Phosphatase 05/20/2024 33  33 - 110 U/L Final    Total Protein 05/20/2024 6.9  6.4 - 8.2 g/dL Final    AST 05/20/2024 13  9 - 39 U/L Final    Bilirubin, Total 05/20/2024 0.6  0.0 - 1.2 mg/dL Final    ALT 05/20/2024 7  7 - 45 U/L Final    Patients treated with Sulfasalazine may generate falsely decreased results for ALT.    Cholesterol 05/20/2024 151  0 - 199 mg/dL Final          Age      Desirable   Borderline High   High     0-19 Y     0 - 169       170 - 199     >/= 200    20-24 Y     0 - 189       190 - 224     >/= 225         >24 Y     0 - 199       200 - 239     >/= 240   **All ranges are based on fasting samples. Specific   therapeutic targets will vary based on patient-specific   cardiac risk.    Pediatric guidelines reference:Pediatrics 2011, 128(S5).Adult guidelines reference: NCEP ATPIII Guidelines,ALMA ROSA 2001, 258:2486-97    Venipuncture immediately after or during the administration of Metamizole may lead to falsely low results. Testing should be performed immediately prior to Metamizole dosing.    HDL-Cholesterol 05/20/2024 57.1  mg/dL Final      Age       Very Low   Low     Normal    High    0-19 Y    < 35      < 40     40-45     ----  20-24 Y    ----     < 40      >45      ----        >24 Y      ----     < 40     40-60      >60      Cholesterol/HDL Ratio 05/20/2024 2.6   Final      Ref Values  Desirable  < 3.4  High Risk  > 5.0    LDL Calculated 05/20/2024 89  <=119 mg/dL Final                                Near   Borderline      AGE      Desirable  Optimal    High     High     Very High     0-19 Y     0 - 109     ---    110-129   >/= 130     ----    20-24 Y     0 - 119     ---     120-159   >/= 160     ----      >24 Y     0 -  99   100-129  130-159   160-189     >/=190      VLDL 05/20/2024 5  0 - 40 mg/dL Final    Triglycerides 05/20/2024 27  0 - 149 mg/dL Final       Age         Desirable   Borderline High   High     Very High   0 D-90 D    19 - 174         ----         ----        ----  91 D- 9 Y     0 -  74        75 -  99     >/= 100      ----    10-19 Y     0 -  89        90 - 129     >/= 130      ----    20-24 Y     0 - 114       115 - 149     >/= 150      ----         >24 Y     0 - 149       150 - 199    200- 499    >/= 500    Venipuncture immediately after or during the administration of Metamizole may lead to falsely low results. Testing should be performed immediately prior to Metamizole dosing.    Non HDL Cholesterol 05/20/2024 94  0 - 149 mg/dL Final          Age       Desirable   Borderline High   High     Very High     0-19 Y     0 - 119       120 - 144     >/= 145    >/= 160    20-24 Y     0 - 149       150 - 189     >/= 190      ----         >24 Y    30 mg/dL above LDL Cholesterol goal      Thyroid Stimulating Hormone 05/20/2024 0.56  0.44 - 3.98 mIU/L Final    Vitamin D, 25-Hydroxy, Total 05/20/2024 31  30 - 100 ng/mL Final    Iron 05/20/2024 76  35 - 150 ug/dL Final    UIBC 05/20/2024 270  110 - 370 ug/dL Final    TIBC 05/20/2024 346  240 - 445 ug/dL Final    % Saturation 05/20/2024 22 (L)  25 - 45 % Final    Ferritin 05/20/2024 37  8 - 150 ng/mL Final   Lab on 01/22/2024   Component Date Value Ref Range Status    Glucose 01/22/2024 100 (H)  74 - 99 mg/dL Final    Sodium 01/22/2024 141  136 - 145 mmol/L Final    Potassium 01/22/2024 4.4  3.5 - 5.3 mmol/L Final    Chloride 01/22/2024 105  98 - 107 mmol/L Final    Bicarbonate 01/22/2024 27  21 - 32 mmol/L Final    Anion Gap 01/22/2024 13  10 - 20 mmol/L Final    Urea Nitrogen 01/22/2024 15  6 - 23 mg/dL Final    Creatinine 01/22/2024 0.82  0.50 - 1.05 mg/dL Final    eGFR 01/22/2024 >90  >60 mL/min/1.73m*2 Final    Calculations  of estimated GFR are performed using the 2021 CKD-EPI Study Refit equation without the race variable for the IDMS-Traceable creatinine methods.  https://jasn.asnjournals.org/content/early/2021/09/22/ASN.3276965442    Calcium 01/22/2024 9.8  8.6 - 10.6 mg/dL Final    Albumin 01/22/2024 4.6  3.4 - 5.0 g/dL Final    Alkaline Phosphatase 01/22/2024 38  33 - 110 U/L Final    Total Protein 01/22/2024 7.0  6.4 - 8.2 g/dL Final    AST 01/22/2024 14  9 - 39 U/L Final    Bilirubin, Total 01/22/2024 0.7  0.0 - 1.2 mg/dL Final    ALT 01/22/2024 8  7 - 45 U/L Final    Patients treated with Sulfasalazine may generate falsely decreased results for ALT.   Lab on 10/26/2023   Component Date Value Ref Range Status    WBC 10/26/2023 6.7  4.4 - 11.3 x10*3/uL Final    nRBC 10/26/2023 0.0  0.0 - 0.0 /100 WBCs Final    RBC 10/26/2023 4.42  4.00 - 5.20 x10*6/uL Final    Hemoglobin 10/26/2023 13.6  12.0 - 16.0 g/dL Final    Hematocrit 10/26/2023 41.1  36.0 - 46.0 % Final    MCV 10/26/2023 93  80 - 100 fL Final    MCH 10/26/2023 30.8  26.0 - 34.0 pg Final    MCHC 10/26/2023 33.1  32.0 - 36.0 g/dL Final    RDW 10/26/2023 11.9  11.5 - 14.5 % Final    Platelets 10/26/2023 231  150 - 450 x10*3/uL Final    MPV 10/26/2023 9.7  7.5 - 11.5 fL Final    Neutrophils % 10/26/2023 49.8  40.0 - 80.0 % Final    Immature Granulocytes %, Automated 10/26/2023 0.1  0.0 - 0.9 % Final    Immature Granulocyte Count (IG) includes promyelocytes, myelocytes and metamyelocytes but does not include bands. Percent differential counts (%) should be interpreted in the context of the absolute cell counts (cells/UL).    Lymphocytes % 10/26/2023 41.6  13.0 - 44.0 % Final    Monocytes % 10/26/2023 6.7  2.0 - 10.0 % Final    Eosinophils % 10/26/2023 1.2  0.0 - 6.0 % Final    Basophils % 10/26/2023 0.6  0.0 - 2.0 % Final    Neutrophils Absolute 10/26/2023 3.33  1.20 - 7.70 x10*3/uL Final    Percent differential counts (%) should be interpreted in the context of the absolute  cell counts (cells/uL).    Immature Granulocytes Absolute, Au* 10/26/2023 0.01  0.00 - 0.70 x10*3/uL Final    Lymphocytes Absolute 10/26/2023 2.78  1.20 - 4.80 x10*3/uL Final    Monocytes Absolute 10/26/2023 0.45  0.10 - 1.00 x10*3/uL Final    Eosinophils Absolute 10/26/2023 0.08  0.00 - 0.70 x10*3/uL Final    Basophils Absolute 10/26/2023 0.04  0.00 - 0.10 x10*3/uL Final    Glucose 10/26/2023 84  74 - 99 mg/dL Final    Sodium 10/26/2023 136  136 - 145 mmol/L Final    Potassium 10/26/2023 4.0  3.5 - 5.3 mmol/L Final    Chloride 10/26/2023 102  98 - 107 mmol/L Final    Bicarbonate 10/26/2023 30  21 - 32 mmol/L Final    Anion Gap 10/26/2023 8 (L)  10 - 20 mmol/L Final    Urea Nitrogen 10/26/2023 13  6 - 23 mg/dL Final    Creatinine 10/26/2023 0.74  0.50 - 1.05 mg/dL Final    eGFR 10/26/2023 >90  >60 mL/min/1.73m*2 Final    Calculations of estimated GFR are performed using the 2021 CKD-EPI Study Refit equation without the race variable for the IDMS-Traceable creatinine methods.  https://jasn.asnjournals.org/content/early/2021/09/22/ASN.4016843066    Calcium 10/26/2023 9.5  8.6 - 10.3 mg/dL Final    Albumin 10/26/2023 5.0  3.4 - 5.0 g/dL Final    Alkaline Phosphatase 10/26/2023 33  33 - 110 U/L Final    Total Protein 10/26/2023 7.2  6.4 - 8.2 g/dL Final    AST 10/26/2023 14  9 - 39 U/L Final    Bilirubin, Total 10/26/2023 0.8  0.0 - 1.2 mg/dL Final    ALT 10/26/2023 8  7 - 45 U/L Final    Patients treated with Sulfasalazine may generate falsely decreased results for ALT.    Clam IgE 10/26/2023 <0.10  <0.10 kU/L Final    Fish (Cod) IgE 10/26/2023 <0.10  <0.10 kU/L Final    [place], [place] IgE 10/26/2023 <0.10   Final    Egg White IgE 10/26/2023 <0.10  <0.10 kU/L Final    Cow's Milk IgE 10/26/2023 <0.10  <0.10 kU/L Final    Peanut IgE 10/26/2023 <0.10  <0.10 kU/L Final    Scallop IgE 10/26/2023 <0.10  <0.10 kU/L Final    Sesame Seed IgE 10/26/2023 <0.10  <0.10 kU/L Final    Shrimp IgE 10/26/2023 <0.10  <0.10 kU/L Final     Soybean IgE 10/26/2023 <0.10  <0.10 kU/L Final    [place] IgE 10/26/2023 <0.10  <0.10 kU/L Final    Wheat IgE 10/26/2023 <0.10  <0.10 kU/L Final    Immunocap IgE 10/26/2023 18.7  <=214 KU/L Final    Note: Omalizumab (Xolair, GenentWheresTheBus; humanized  IgG1 antihuman IgE Fc) treatment does not  significantly interfere with the accuracy of  total IgE on the ImmunoCAP ([employer]) platform.  J Allergy Clin Immunol 2006;117:759-66).  Allergens, parasitic diseases, smoking, and  alcohol consumption have been reported to  increase levels of total IgE in serum.    Bermuda Grass IgE 10/26/2023 <0.10  <0.10 kU/L Final    [place] Grass IgE 10/26/2023 <0.10  <0.10 kU/L Final    [place] Grass, [place] Blue IgE 10/26/2023 0.61 (Low)  <0.10 kU/L Final    Jay Grass IgE 10/26/2023 0.57 (Low)  <0.10 kU/L Final    [place], [place] IgE 10/26/2023 <0.10  <0.10 kU/L Final    Common Pigweed IgE 10/26/2023 <0.10  <0.10 kU/L Final    Common Ragweed IgE 10/26/2023 <0.10  <0.10 kU/L Final    White D[place]ne IgE 10/26/2023 <0.10  <0.10 kU/L Final    Common Silver Birch IgE 10/26/2023 <0.10  <0.10 kU/L Final    Box-Elder IgE 10/26/2023 <0.10  <0.10 kU/L Final    Mountain Juniper IgE 10/26/2023 <0.10  <0.10 Final    Worth IgE 10/26/2023 <0.10  <0.10 kU/L Final    Elm IgE 10/26/2023 <0.10  <0.10 kU/L Final    Powell IgE 10/26/2023 <0.10  <0.10 Final    Pecan, [place] IgE 10/26/2023 0.77 (Mod)  <0.10 Final    Maple Speculator [place], [place] * 10/26/2023 <0.10  <0.10 Final    [place] Tree IgE 10/26/2023 0.35 (Low)  <0.10 Final    Russian Thistle IgE 10/26/2023 <0.10  <0.10 kU/L Final    Sheep [place] IgE 10/26/2023 <0.10  <0.10 kU/L Final    Cat Dander IgE 10/26/2023 <0.10  <0.10 kU/L Final    Dog Dander IgE 10/26/2023 <0.10  <0.10 kU/L Final    Alternaria Alternata IgE 10/26/2023 <0.10  <0.10 kU/L Final    Cladosporium Herbarum IgE 10/26/2023 <0.10  <0.10 kU/L Final    English Plantain IgE 10/26/2023 <0.10  <0.10 Final    Dust Mite (D.  farinae) IgE 10/26/2023 1.00 (Mod)  <0.10 kU/L Final    Dust Mite (D. pteronyssinus) IgE 10/26/2023 0.74 (Mod)  <0.10 kU/L Final    Ind[place]ian Cockroach IgE 10/26/2023 <0.10  <0.10 kU/L Final    Aspergillus Fumigatus IgE 10/26/2023 <0.10  <0.10 kU/L Final    [place] IgE 10/26/2023 <0.10  <0.10 kU/L Final    Penicillium Chrysogenum IgE 10/26/2023 <0.10  <0.10 kU/L Final     Current Outpatient Medications on File Prior to Visit   Medication Sig Dispense Refill    albuterol 90 mcg/actuation inhaler Inhale 2 puffs every 4 hours if needed.       No current facility-administered medications on file prior to visit.     No images are attached to the encounter.        Assessment/Plan   Diagnoses and all orders for this visit:  Healthcare maintenance  Anxiety  Reactive depression  Anorexia nervosa (Multi)  Migraine without aura and without status migrainosus, not intractable  Screening for cervical cancer  -     THINPREP PAP TEST    1.  Patient's blood work discussed at this office visit  2.  Patient's LDL goal less than 130 current LDL 89  3.  Mammogram @ 40  4.  DEXA @ 65  5.  Colonoscopy @ 45  6.  Patient to call if questions or concerns

## 2024-06-04 ENCOUNTER — OFFICE VISIT (OUTPATIENT)
Dept: PRIMARY CARE | Facility: CLINIC | Age: 24
End: 2024-06-04
Payer: COMMERCIAL

## 2024-06-04 VITALS
DIASTOLIC BLOOD PRESSURE: 66 MMHG | BODY MASS INDEX: 20.86 KG/M2 | HEART RATE: 70 BPM | SYSTOLIC BLOOD PRESSURE: 110 MMHG | WEIGHT: 125.2 LBS | HEIGHT: 65 IN

## 2024-06-04 DIAGNOSIS — Z12.4 SCREENING FOR CERVICAL CANCER: ICD-10-CM

## 2024-06-04 DIAGNOSIS — F50.00 ANOREXIA NERVOSA (MULTI): ICD-10-CM

## 2024-06-04 DIAGNOSIS — F32.9 REACTIVE DEPRESSION: ICD-10-CM

## 2024-06-04 DIAGNOSIS — G43.009 MIGRAINE WITHOUT AURA AND WITHOUT STATUS MIGRAINOSUS, NOT INTRACTABLE: ICD-10-CM

## 2024-06-04 DIAGNOSIS — Z00.00 HEALTHCARE MAINTENANCE: Primary | ICD-10-CM

## 2024-06-04 DIAGNOSIS — F41.9 ANXIETY: ICD-10-CM

## 2024-06-04 PROCEDURE — 99395 PREV VISIT EST AGE 18-39: CPT | Performed by: FAMILY MEDICINE

## 2024-06-04 PROCEDURE — 87624 HPV HI-RISK TYP POOLED RSLT: CPT

## 2024-06-04 PROCEDURE — 1036F TOBACCO NON-USER: CPT | Performed by: FAMILY MEDICINE

## 2024-06-04 PROCEDURE — 88175 CYTOPATH C/V AUTO FLUID REDO: CPT

## 2024-06-11 DIAGNOSIS — Z00.00 HEALTHCARE MAINTENANCE: ICD-10-CM

## 2024-06-11 RX ORDER — ALBUTEROL SULFATE 90 UG/1
2 AEROSOL, METERED RESPIRATORY (INHALATION) EVERY 4 HOURS PRN
Qty: 18 G | Refills: 1 | Status: SHIPPED | OUTPATIENT
Start: 2024-06-11

## 2024-06-18 LAB
CYTOLOGY CMNT CVX/VAG CYTO-IMP: NORMAL
HPV HR 12 DNA GENITAL QL NAA+PROBE: NEGATIVE
HPV HR GENOTYPES PNL CVX NAA+PROBE: NEGATIVE
HPV16 DNA SPEC QL NAA+PROBE: NEGATIVE
HPV18 DNA SPEC QL NAA+PROBE: NEGATIVE
LAB AP HPV GENOTYPE QUESTION: YES
LAB AP HPV HR: NORMAL
LABORATORY COMMENT REPORT: NORMAL
PATH REPORT.TOTAL CANCER: NORMAL

## 2024-08-20 ENCOUNTER — APPOINTMENT (OUTPATIENT)
Dept: PRIMARY CARE | Facility: CLINIC | Age: 24
End: 2024-08-20
Payer: COMMERCIAL

## 2024-08-20 VITALS
SYSTOLIC BLOOD PRESSURE: 112 MMHG | HEART RATE: 65 BPM | DIASTOLIC BLOOD PRESSURE: 70 MMHG | WEIGHT: 122 LBS | TEMPERATURE: 97.4 F | OXYGEN SATURATION: 98 % | BODY MASS INDEX: 20.3 KG/M2

## 2024-08-20 DIAGNOSIS — G47.429 NARCOLEPSY DUE TO UNDERLYING CONDITION WITHOUT CATAPLEXY (HHS-HCC): ICD-10-CM

## 2024-08-20 DIAGNOSIS — F50.00 ANOREXIA NERVOSA (MULTI): ICD-10-CM

## 2024-08-20 DIAGNOSIS — F32.9 REACTIVE DEPRESSION: ICD-10-CM

## 2024-08-20 DIAGNOSIS — F41.9 ANXIETY: Primary | ICD-10-CM

## 2024-08-20 PROCEDURE — 99214 OFFICE O/P EST MOD 30 MIN: CPT | Performed by: FAMILY MEDICINE

## 2024-08-20 PROCEDURE — 1036F TOBACCO NON-USER: CPT | Performed by: FAMILY MEDICINE

## 2024-08-20 RX ORDER — CHOLECALCIFEROL (VITAMIN D3) 50 MCG
2000 TABLET ORAL DAILY
COMMUNITY

## 2024-08-20 ASSESSMENT — ENCOUNTER SYMPTOMS
BACK PAIN: 0
COLOR CHANGE: 0
DIZZINESS: 0
APPETITE CHANGE: 0
PALPITATIONS: 0
LOSS OF SENSATION IN FEET: 0
FACIAL ASYMMETRY: 0
DEPRESSION: 0
FATIGUE: 0
CHEST TIGHTNESS: 0
LIGHT-HEADEDNESS: 0
HEADACHES: 0
ARTHRALGIAS: 0
OCCASIONAL FEELINGS OF UNSTEADINESS: 0
ACTIVITY CHANGE: 0
FEVER: 0
COUGH: 0
CHOKING: 0

## 2024-08-20 ASSESSMENT — COLUMBIA-SUICIDE SEVERITY RATING SCALE - C-SSRS
2. HAVE YOU ACTUALLY HAD ANY THOUGHTS OF KILLING YOURSELF?: NO
6. HAVE YOU EVER DONE ANYTHING, STARTED TO DO ANYTHING, OR PREPARED TO DO ANYTHING TO END YOUR LIFE?: NO
1. IN THE PAST MONTH, HAVE YOU WISHED YOU WERE DEAD OR WISHED YOU COULD GO TO SLEEP AND NOT WAKE UP?: NO

## 2024-08-20 ASSESSMENT — PATIENT HEALTH QUESTIONNAIRE - PHQ9
2. FEELING DOWN, DEPRESSED OR HOPELESS: NOT AT ALL
1. LITTLE INTEREST OR PLEASURE IN DOING THINGS: NOT AT ALL
SUM OF ALL RESPONSES TO PHQ9 QUESTIONS 1 AND 2: 0
10. IF YOU CHECKED OFF ANY PROBLEMS, HOW DIFFICULT HAVE THESE PROBLEMS MADE IT FOR YOU TO DO YOUR WORK, TAKE CARE OF THINGS AT HOME, OR GET ALONG WITH OTHER PEOPLE: NOT DIFFICULT AT ALL

## 2024-08-20 NOTE — PROGRESS NOTES
Subjective   Patient ID: Whit Chacko is a 24 y.o. female who presents for Follow-up (Eating and very tired).    HPI   Patient comes in that she is here to follow-up.    Stress with her job with the [employer] is minimal. She does like her job and is excited to be there. She is now works with one doctor specifically. She is still seeing her counselor and dietician.     A week ago she was having a trouble with staying awake. She is having more trouble falling asleep during the day. She has been seeing sleep specialist but reports that she has a follow up appointment in September.     Review of Systems   Constitutional:  Negative for activity change, appetite change, fatigue and fever.   HENT:  Negative for congestion.    Respiratory:  Negative for cough, choking and chest tightness.    Cardiovascular:  Negative for chest pain, palpitations and leg swelling.   Musculoskeletal:  Negative for arthralgias, back pain and gait problem.   Skin:  Negative for color change and pallor.   Neurological:  Negative for dizziness, facial asymmetry, light-headedness and headaches.       Objective   /70   Pulse 65   Temp 36.3 °C (97.4 °F)   Wt 55.3 kg (122 lb)   SpO2 98%   BMI 20.30 kg/m²   BSA Body surface area is 1.59 meters squared.      Physical Exam  Constitutional:       General: She is not in acute distress.     Appearance: Normal appearance. She is not toxic-appearing.   HENT:      Head: Normocephalic.      Right Ear: Tympanic membrane, ear canal and external ear normal.      Left Ear: Tympanic membrane, ear canal and external ear normal.   Eyes:      Conjunctiva/sclera: Conjunctivae normal.      Pupils: Pupils are equal, round, and reactive to light.   Cardiovascular:      Rate and Rhythm: Normal rate and regular rhythm.      Pulses: Normal pulses.      Heart sounds: Normal heart sounds.   Pulmonary:      Effort: No respiratory distress.      Breath sounds: No wheezing, rhonchi or rales.   Musculoskeletal:          General: No swelling or tenderness.      Cervical back: No tenderness.   Skin:     Findings: No lesion or rash.   Neurological:      General: No focal deficit present.      Mental Status: She is alert and oriented to person, place, and time. Mental status is at baseline.      Gait: Gait normal.   Psychiatric:         Mood and Affect: Mood normal.         Behavior: Behavior normal.         Thought Content: Thought content normal.         Judgment: Judgment normal.       Office Visit on 06/04/2024   Component Date Value Ref Range Status    Case Report 06/04/2024    Final                    Value:Gynecologic Cytology                              Case: [id number]                                   Authorizing Provider:  Giovana Pulido DO         Collected:           06/04/2024 1545              Ordering Location:     [facility]    Received:            06/04/2024 1545              First Screen:          Maribel Dhaliwal, CT                                                              Rescreen:              BESSY Ward                                                            Specimen:    ThinPrep Liquid-Based Pap-Imaging System Screen, ECTO/ENDOCERVIX/VAGINA, SCREENING         Final Cytological Interpretation 06/04/2024    Final                    Value:                                                    A. THINPREP PAP ECTO/ENDOCERVIX/VAGINA, SCREENING -                                                     Specimen Adequacy                          Satisfactory for evaluation; endocervical/transformation zone component is                           present                                                    General Categorization                          Negative for intraepithelial lesion or malignancy.                                                    Descriptive Interpretation                          Negative for intraepithelial lesion or malignancy                                                                                     06/04/2024    Final                    Value:Slide(s) initially screened by BESSY HUERTA at [facility]                           [id number] [facility] [address] [id number]                          QC review performed by BESSY Ward at [facility] [facility] [id number]                          By the signature on this report, the individual or group listed as making                           the Final Interpretation/Diagnosis certifies that they have reviewed this                           case.     [employer] System 06/04/2024    Final                    Value:This specimen has been analyzed by the [employer] Imaging System ([employer] an automated imaging and review system, which assists the                           laboratory in evaluating cells on ThinPrep Pap tests. Following automated                           imaging, selected fields from every slide were reviewed by a                           cytotechnologist and/or pathologist.                              Educational Note 06/04/2024    Final                    Value:Cervical cytology is a screening procedure primarily for squamous cancers                           and precursors and has associated false-negative and false-positives                           results as evidenced by published data. Your patient's test should be                           interpreted in this context, together with the patient's history and                           clinical findings. Regular sampling and follow-up of unexplained clinical                           signs and symptoms are recommended to minimize false negative results.    Perform HPV HR test? 06/04/2024 Always (all interpretations)   Final    Include HPV Genotype? 06/04/2024 Yes   Final    HPV, high-risk 06/04/2024 Negative   Negative Final    HPV Type 16 DNA 06/04/2024 Negative  Negative Final    HPV Type 18 DNA 06/04/2024 Negative  Negative Final    HPV non-Type 16 or 18 DNA 06/04/2024 Negative  Negative Final   Lab on 05/20/2024   Component Date Value Ref Range Status    WBC 05/20/2024 5.9  4.4 - 11.3 x10*3/uL Final    nRBC 05/20/2024 0.0  0.0 - 0.0 /100 WBCs Final    RBC 05/20/2024 4.34  4.00 - 5.20 x10*6/uL Final    Hemoglobin 05/20/2024 13.2  12.0 - 16.0 g/dL Final    Hematocrit 05/20/2024 39.6  36.0 - 46.0 % Final    MCV 05/20/2024 91  80 - 100 fL Final    MCH 05/20/2024 30.4  26.0 - 34.0 pg Final    MCHC 05/20/2024 33.3  32.0 - 36.0 g/dL Final    RDW 05/20/2024 11.9  11.5 - 14.5 % Final    Platelets 05/20/2024 248  150 - 450 x10*3/uL Final    Neutrophils % 05/20/2024 58.6  40.0 - 80.0 % Final    Immature Granulocytes %, Automated 05/20/2024 0.2  0.0 - 0.9 % Final    Immature Granulocyte Count (IG) includes promyelocytes, myelocytes and metamyelocytes but does not include bands. Percent differential counts (%) should be interpreted in the context of the absolute cell counts (cells/UL).    Lymphocytes % 05/20/2024 34.3  13.0 - 44.0 % Final    Monocytes % 05/20/2024 5.7  2.0 - 10.0 % Final    Eosinophils % 05/20/2024 0.7  0.0 - 6.0 % Final    Basophils % 05/20/2024 0.5  0.0 - 2.0 % Final    Neutrophils Absolute 05/20/2024 3.48  1.20 - 7.70 x10*3/uL Final    Percent differential counts (%) should be interpreted in the context of the absolute cell counts (cells/uL).    Immature Granulocytes Absolute, Au* 05/20/2024 0.01  0.00 - 0.70 x10*3/uL Final    Lymphocytes Absolute 05/20/2024 2.04  1.20 - 4.80 x10*3/uL Final    Monocytes Absolute 05/20/2024 0.34  0.10 - 1.00 x10*3/uL Final    Eosinophils Absolute 05/20/2024 0.04  0.00 - 0.70 x10*3/uL Final    Basophils Absolute 05/20/2024 0.03  0.00 - 0.10 x10*3/uL Final    Glucose 05/20/2024 91  74 - 99 mg/dL Final    Sodium 05/20/2024 139  136 - 145 mmol/L Final    Potassium 05/20/2024 4.4   3.5 - 5.3 mmol/L Final    Chloride 05/20/2024 103  98 - 107 mmol/L Final    Bicarbonate 05/20/2024 25  21 - 32 mmol/L Final    Anion Gap 05/20/2024 15  10 - 20 mmol/L Final    Urea Nitrogen 05/20/2024 12  6 - 23 mg/dL Final    Creatinine 05/20/2024 0.72  0.50 - 1.05 mg/dL Final    eGFR 05/20/2024 >90  >60 mL/min/1.73m*2 Final    Calculations of estimated GFR are performed using the 2021 CKD-EPI Study Refit equation without the race variable for the IDMS-Traceable creatinine methods.  https://jasn.asnjournals.org/content/early/2021/09/22/ASN.6090016219    Calcium 05/20/2024 9.5  8.6 - 10.6 mg/dL Final    Albumin 05/20/2024 4.8  3.4 - 5.0 g/dL Final    Alkaline Phosphatase 05/20/2024 33  33 - 110 U/L Final    Total Protein 05/20/2024 6.9  6.4 - 8.2 g/dL Final    AST 05/20/2024 13  9 - 39 U/L Final    Bilirubin, Total 05/20/2024 0.6  0.0 - 1.2 mg/dL Final    ALT 05/20/2024 7  7 - 45 U/L Final    Patients treated with Sulfasalazine may generate falsely decreased results for ALT.    Cholesterol 05/20/2024 151  0 - 199 mg/dL Final          Age      Desirable   Borderline High   High     0-19 Y     0 - 169       170 - 199     >/= 200    20-24 Y     0 - 189       190 - 224     >/= 225         >24 Y     0 - 199       200 - 239     >/= 240   **All ranges are based on fasting samples. Specific   therapeutic targets will vary based on patient-specific   cardiac risk.    Pediatric guidelines reference:Pediatrics 2011, 128(S5).Adult guidelines reference: NCEP ATPIII Guidelines,ALMA ROSA 2001, 258:2486-97    Venipuncture immediately after or during the administration of Metamizole may lead to falsely low results. Testing should be performed immediately prior to Metamizole dosing.    HDL-Cholesterol 05/20/2024 57.1  mg/dL Final      Age       Very Low   Low     Normal    High    0-19 Y    < 35      < 40     40-45     ----  20-24 Y    ----     < 40      >45      ----        >24 Y      ----     < 40     40-60      >60      Cholesterol/HDL  Ratio 05/20/2024 2.6   Final      Ref Values  Desirable  < 3.4  High Risk  > 5.0    LDL Calculated 05/20/2024 89  <=119 mg/dL Final                                Near   Borderline      AGE      Desirable  Optimal    High     High     Very High     0-19 Y     0 - 109     ---    110-129   >/= 130     ----    20-24 Y     0 - 119     ---    120-159   >/= 160     ----      >24 Y     0 -  99   100-129  130-159   160-189     >/=190      VLDL 05/20/2024 5  0 - 40 mg/dL Final    Triglycerides 05/20/2024 27  0 - 149 mg/dL Final       Age         Desirable   Borderline High   High     Very High   0 D-90 D    19 - 174         ----         ----        ----  91 D- 9 Y     0 -  74        75 -  99     >/= 100      ----    10-19 Y     0 -  89        90 - 129     >/= 130      ----    20-24 Y     0 - 114       115 - 149     >/= 150      ----         >24 Y     0 - 149       150 - 199    200- 499    >/= 500    Venipuncture immediately after or during the administration of Metamizole may lead to falsely low results. Testing should be performed immediately prior to Metamizole dosing.    Non HDL Cholesterol 05/20/2024 94  0 - 149 mg/dL Final          Age       Desirable   Borderline High   High     Very High     0-19 Y     0 - 119       120 - 144     >/= 145    >/= 160    20-24 Y     0 - 149       150 - 189     >/= 190      ----         >24 Y    30 mg/dL above LDL Cholesterol goal      Thyroid Stimulating Hormone 05/20/2024 0.56  0.44 - 3.98 mIU/L Final    Vitamin D, 25-Hydroxy, Total 05/20/2024 31  30 - 100 ng/mL Final    Iron 05/20/2024 76  35 - 150 ug/dL Final    UIBC 05/20/2024 270  110 - 370 ug/dL Final    TIBC 05/20/2024 346  240 - 445 ug/dL Final    % Saturation 05/20/2024 22 (L)  25 - 45 % Final    Ferritin 05/20/2024 37  8 - 150 ng/mL Final   Lab on 01/22/2024   Component Date Value Ref Range Status    Glucose 01/22/2024 100 (H)  74 - 99 mg/dL Final    Sodium 01/22/2024 141  136 - 145 mmol/L Final    Potassium 01/22/2024 4.4  3.5  - 5.3 mmol/L Final    Chloride 01/22/2024 105  98 - 107 mmol/L Final    Bicarbonate 01/22/2024 27  21 - 32 mmol/L Final    Anion Gap 01/22/2024 13  10 - 20 mmol/L Final    Urea Nitrogen 01/22/2024 15  6 - 23 mg/dL Final    Creatinine 01/22/2024 0.82  0.50 - 1.05 mg/dL Final    eGFR 01/22/2024 >90  >60 mL/min/1.73m*2 Final    Calculations of estimated GFR are performed using the 2021 CKD-EPI Study Refit equation without the race variable for the IDMS-Traceable creatinine methods.  https://jasn.asnjournals.org/content/early/2021/09/22/ASN.5111198844    Calcium 01/22/2024 9.8  8.6 - 10.6 mg/dL Final    Albumin 01/22/2024 4.6  3.4 - 5.0 g/dL Final    Alkaline Phosphatase 01/22/2024 38  33 - 110 U/L Final    Total Protein 01/22/2024 7.0  6.4 - 8.2 g/dL Final    AST 01/22/2024 14  9 - 39 U/L Final    Bilirubin, Total 01/22/2024 0.7  0.0 - 1.2 mg/dL Final    ALT 01/22/2024 8  7 - 45 U/L Final    Patients treated with Sulfasalazine may generate falsely decreased results for ALT.   Lab on 10/26/2023   Component Date Value Ref Range Status    WBC 10/26/2023 6.7  4.4 - 11.3 x10*3/uL Final    nRBC 10/26/2023 0.0  0.0 - 0.0 /100 WBCs Final    RBC 10/26/2023 4.42  4.00 - 5.20 x10*6/uL Final    Hemoglobin 10/26/2023 13.6  12.0 - 16.0 g/dL Final    Hematocrit 10/26/2023 41.1  36.0 - 46.0 % Final    MCV 10/26/2023 93  80 - 100 fL Final    MCH 10/26/2023 30.8  26.0 - 34.0 pg Final    MCHC 10/26/2023 33.1  32.0 - 36.0 g/dL Final    RDW 10/26/2023 11.9  11.5 - 14.5 % Final    Platelets 10/26/2023 231  150 - 450 x10*3/uL Final    MPV 10/26/2023 9.7  7.5 - 11.5 fL Final    Neutrophils % 10/26/2023 49.8  40.0 - 80.0 % Final    Immature Granulocytes %, Automated 10/26/2023 0.1  0.0 - 0.9 % Final    Immature Granulocyte Count (IG) includes promyelocytes, myelocytes and metamyelocytes but does not include bands. Percent differential counts (%) should be interpreted in the context of the absolute cell counts (cells/UL).    Lymphocytes %  10/26/2023 41.6  13.0 - 44.0 % Final    Monocytes % 10/26/2023 6.7  2.0 - 10.0 % Final    Eosinophils % 10/26/2023 1.2  0.0 - 6.0 % Final    Basophils % 10/26/2023 0.6  0.0 - 2.0 % Final    Neutrophils Absolute 10/26/2023 3.33  1.20 - 7.70 x10*3/uL Final    Percent differential counts (%) should be interpreted in the context of the absolute cell counts (cells/uL).    Immature Granulocytes Absolute, Au* 10/26/2023 0.01  0.00 - 0.70 x10*3/uL Final    Lymphocytes Absolute 10/26/2023 2.78  1.20 - 4.80 x10*3/uL Final    Monocytes Absolute 10/26/2023 0.45  0.10 - 1.00 x10*3/uL Final    Eosinophils Absolute 10/26/2023 0.08  0.00 - 0.70 x10*3/uL Final    Basophils Absolute 10/26/2023 0.04  0.00 - 0.10 x10*3/uL Final    Glucose 10/26/2023 84  74 - 99 mg/dL Final    Sodium 10/26/2023 136  136 - 145 mmol/L Final    Potassium 10/26/2023 4.0  3.5 - 5.3 mmol/L Final    Chloride 10/26/2023 102  98 - 107 mmol/L Final    Bicarbonate 10/26/2023 30  21 - 32 mmol/L Final    Anion Gap 10/26/2023 8 (L)  10 - 20 mmol/L Final    Urea Nitrogen 10/26/2023 13  6 - 23 mg/dL Final    Creatinine 10/26/2023 0.74  0.50 - 1.05 mg/dL Final    eGFR 10/26/2023 >90  >60 mL/min/1.73m*2 Final    Calculations of estimated GFR are performed using the 2021 CKD-EPI Study Refit equation without the race variable for the IDMS-Traceable creatinine methods.  https://jasn.asnjournals.org/content/early/2021/09/22/ASN.1314588973    Calcium 10/26/2023 9.5  8.6 - 10.3 mg/dL Final    Albumin 10/26/2023 5.0  3.4 - 5.0 g/dL Final    Alkaline Phosphatase 10/26/2023 33  33 - 110 U/L Final    Total Protein 10/26/2023 7.2  6.4 - 8.2 g/dL Final    AST 10/26/2023 14  9 - 39 U/L Final    Bilirubin, Total 10/26/2023 0.8  0.0 - 1.2 mg/dL Final    ALT 10/26/2023 8  7 - 45 U/L Final    Patients treated with Sulfasalazine may generate falsely decreased results for ALT.    Clam IgE 10/26/2023 <0.10  <0.10 kU/L Final    Fish (Cod) IgE 10/26/2023 <0.10  <0.10 kU/L Final    [place], [place]  IgE 10/26/2023 <0.10   Final    Egg White IgE 10/26/2023 <0.10  <0.10 kU/L Final    Cow's Milk IgE 10/26/2023 <0.10  <0.10 kU/L Final    Peanut IgE 10/26/2023 <0.10  <0.10 kU/L Final    Scallop IgE 10/26/2023 <0.10  <0.10 kU/L Final    Sesame Seed IgE 10/26/2023 <0.10  <0.10 kU/L Final    Shrimp IgE 10/26/2023 <0.10  <0.10 kU/L Final    Soybean IgE 10/26/2023 <0.10  <0.10 kU/L Final    Benedicta IgE 10/26/2023 <0.10  <0.10 kU/L Final    Wheat IgE 10/26/2023 <0.10  <0.10 kU/L Final    Immunocap IgE 10/26/2023 18.7  <=214 KU/L Final    Note: Omalizumab (Xolair, TraceWorks; humanized  IgG1 antihuman IgE Fc) treatment does not  significantly interfere with the accuracy of  total IgE on the ImmunoCAP ([employer]) platform.  J Allergy Clin Immunol 2006;117:759-66).  Allergens, parasitic diseases, smoking, and  alcohol consumption have been reported to  increase levels of total IgE in serum.    [place] Grass IgE 10/26/2023 <0.10  <0.10 kU/L Final    [place] Grass IgE 10/26/2023 <0.10  <0.10 kU/L Final    [place] Grass, [place] Blue IgE 10/26/2023 0.61 (Low)  <0.10 kU/L Final    [place] Grass IgE 10/26/2023 0.57 (Low)  <0.10 kU/L Final    [place], [place] IgE 10/26/2023 <0.10  <0.10 kU/L Final    Common Pigweed IgE 10/26/2023 <0.10  <0.10 kU/L Final    Common Ragweed IgE 10/26/2023 <0.10  <0.10 kU/L Final    White [place] IgE 10/26/2023 <0.10  <0.10 kU/L Final    Common Silver Birch IgE 10/26/2023 <0.10  <0.10 kU/L Final    Box-Elder IgE 10/26/2023 <0.10  <0.10 kU/L Final    [place]ountain Juniper IgE 10/26/2023 <0.10  <0.10 Final    [place] IgE 10/26/2023 <0.10  <0.10 kU/L Final    Elm IgE 10/26/2023 <0.10  <0.10 kU/L Final    [place] IgE 10/26/2023 <0.10  <0.10 Final    Pecan, [place] IgE 10/26/2023 0.77 (Mod)  <0.10 Final    Maple [place], [place] * 10/26/2023 <0.10  <0.10 Final    B[place]ict[place] Tree IgE 10/26/2023 0.35 (Low)  <0.10 Final    Russian Thistle IgE 10/26/2023 <0.10  <0.10 kU/L Final    Sheep [place] IgE  10/26/2023 <0.10  <0.10 kU/L Final    Cat Dander IgE 10/26/2023 <0.10  <0.10 kU/L Final    Dog Dander IgE 10/26/2023 <0.10  <0.10 kU/L Final    Alternaria Alternata IgE 10/26/2023 <0.10  <0.10 kU/L Final    Cladosporium Herbarum IgE 10/26/2023 <0.10  <0.10 kU/L Final    English Plantain IgE 10/26/2023 <0.10  <0.10 Final    Dust Mite (D. farinae) IgE 10/26/2023 1.00 (Mod)  <0.10 kU/L Final    Dust Mite (D. pteronyssinus) IgE 10/26/2023 0.74 (Mod)  <0.10 kU/L Final    Emirati Cockroach IgE 10/26/2023 <0.10  <0.10 kU/L Final    Aspergillus Fumigatus IgE 10/26/2023 <0.10  <0.10 kU/L Final    [place] IgE 10/26/2023 <0.10  <0.10 kU/L Final    Penicillium Chrysogenum IgE 10/26/2023 <0.10  <0.10 kU/L Final     Current Outpatient Medications on File Prior to Visit   Medication Sig Dispense Refill    albuterol 90 mcg/actuation inhaler Inhale 2 puffs every 4 hours if needed for wheezing. 18 g 1    cholecalciferol (Vitamin D-3) 50 MCG (2000 UT) tablet Take 1 tablet (2,000 Units) by mouth once daily.       No current facility-administered medications on file prior to visit.     No images are attached to the encounter.        Assessment/Plan   Diagnoses and all orders for this visit:  Anxiety  Reactive depression  Anorexia nervosa (Multi)  Narcolepsy due to underlying condition without cataplexy ([facility])    Discussion with patient about continuing with the counselor every 3 weeks, needs to see her dietician in 3 months  2.   Needs to see sleep speicalist, concerned about modafanil with her personal history of anorexia  3.   Patient to call for questions or concerns

## 2024-09-24 ENCOUNTER — OFFICE VISIT (OUTPATIENT)
Dept: SLEEP MEDICINE | Facility: CLINIC | Age: 24
End: 2024-09-24
Payer: COMMERCIAL

## 2024-09-24 VITALS
DIASTOLIC BLOOD PRESSURE: 65 MMHG | BODY MASS INDEX: 20.66 KG/M2 | OXYGEN SATURATION: 100 % | SYSTOLIC BLOOD PRESSURE: 102 MMHG | RESPIRATION RATE: 16 BRPM | HEIGHT: 65 IN | HEART RATE: 64 BPM | WEIGHT: 124 LBS

## 2024-09-24 DIAGNOSIS — G47.419 NARCOLEPSY WITHOUT CATAPLEXY (HHS-HCC): Primary | ICD-10-CM

## 2024-09-24 DIAGNOSIS — R53.82 CHRONIC FATIGUE: ICD-10-CM

## 2024-09-24 PROCEDURE — 3008F BODY MASS INDEX DOCD: CPT | Performed by: PHYSICIAN ASSISTANT

## 2024-09-24 PROCEDURE — 99417 PROLNG OP E/M EACH 15 MIN: CPT | Performed by: PHYSICIAN ASSISTANT

## 2024-09-24 PROCEDURE — 99215 OFFICE O/P EST HI 40 MIN: CPT | Performed by: PHYSICIAN ASSISTANT

## 2024-09-24 SDOH — ECONOMIC STABILITY: FOOD INSECURITY: WITHIN THE PAST 12 MONTHS, THE FOOD YOU BOUGHT JUST DIDN'T LAST AND YOU DIDN'T HAVE MONEY TO GET MORE.: NEVER TRUE

## 2024-09-24 SDOH — ECONOMIC STABILITY: FOOD INSECURITY: WITHIN THE PAST 12 MONTHS, YOU WORRIED THAT YOUR FOOD WOULD RUN OUT BEFORE YOU GOT MONEY TO BUY MORE.: NEVER TRUE

## 2024-09-24 ASSESSMENT — PATIENT HEALTH QUESTIONNAIRE - PHQ9
1. LITTLE INTEREST OR PLEASURE IN DOING THINGS: NOT AT ALL
SUM OF ALL RESPONSES TO PHQ9 QUESTIONS 1 AND 2: 0
2. FEELING DOWN, DEPRESSED OR HOPELESS: NOT AT ALL

## 2024-09-24 ASSESSMENT — PAIN SCALES - GENERAL: PAINLEVEL: 0-NO PAIN

## 2024-09-24 ASSESSMENT — LIFESTYLE VARIABLES
HOW OFTEN DO YOU HAVE SIX OR MORE DRINKS ON ONE OCCASION: NEVER
AUDIT-C TOTAL SCORE: 1
HOW OFTEN DO YOU HAVE A DRINK CONTAINING ALCOHOL: MONTHLY OR LESS
HOW MANY STANDARD DRINKS CONTAINING ALCOHOL DO YOU HAVE ON A TYPICAL DAY: 1 OR 2
SKIP TO QUESTIONS 9-10: 1

## 2024-09-24 ASSESSMENT — ENCOUNTER SYMPTOMS
LOSS OF SENSATION IN FEET: 0
OCCASIONAL FEELINGS OF UNSTEADINESS: 0
DEPRESSION: 0

## 2024-09-24 NOTE — PROGRESS NOTES
Patient: Whit Chacko    [id number]  : 2000 -- AGE 24 y.o.    Provider: Emy Faulkner PA-C     Location [facility]   Service Date: 2024              [facility]  Followup Visit Note    HISTORY OF PRESENT ILLNESS     HISTORY OF PRESENT ILLNESS   Whit Chacko is a 24 y.o. 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     PAST SLEEP HISTORY:  PSG 2022: Did not indicate YEFRI, did sleep 7.5 hours during the study; all sleep stages achieved   MSLT 2022: MSL of 4.3 min and 2 SoREMPs; sleep logs noted for 6-7 hours of sleep most night and delayed sleep pattern on weekends compared with week days     Assessment and plan from last visit: 2022--  NARCOLEPSY - type 2  -encouraged a minimum of 8 hours of overnight sleep  -encourage afternoon nap if possible even just 15-20 minutes (afternoon is when she feels most tired)  -discussed medication options with patient; recommended starting with Wake Promoting agent such as Modafinl --> patient states she feels she is doing ok at the moment staying awake and would like to work on extending her sleep to see how she feels  -other recommendations: get some daily exercise even just walking to help improve sleep quality, keep sleep schedule consistent, practice good sleep hygiene      Fatigue -  -Has struggled with anorexia nervosa --> states currently this is well controlled/ sees a therapist and has completed Ilda Program/IOP and has worked with dieticians  -reviewed recent ferritin/iron studies/TSH, vitamin D --> normal limits  -may have some underlying depression -->referred to psychiatry at last session; has not scheduled an appointment, no SI/HI        RTC 3-4 mo, sooner if needed. May reach out at any time if she would like to trial wake promoting agent for her sleepiness.   Avoid Drowsy Driving     Current History    On  today's visit, the patient reports she has had a noteable increase in her daytime sleepiness over the past two months. States she is getting more drowsy diring the day. Had one episode where she got very drowsy at the wheel and was nervous she was going to fall asleep. States another time, she was working (is an RN) and had to stand up and move around to prevent herself from falling asleep. She did fall asleep in the waiting room at a dental office recnently. After last visit she did not feel ready to start medication. At this time, she still does not know if she is ready to start medication, but would like to re-discuss some options.    Reviewed- no cataplexy symptoms, sleep paralysis, hallucination    Recap- history of anorexia nervosa, has participated in the [employer] program in the past; states she is doing better and continues to follow with a therapist. She is concerned a stimulant medication may decrease her appetitie to much, as she feels she still may eat less than she should.     SLEEP SCHEDULE:  In bed: 10-10:30PM  Subjective sleep latency: 5 min or less   Awakenings during night: usually note   Final awakening time: 6:AM  Overall estimate of total sleep time: 7-8 hours     On weekends/Non-work days: usually the same      Naps: none - due to schedule   Refreshing?: variable     ESS:  13  LIZA:  8  FOSQ: 31    REVIEW OF SYSTEMS     REVIEW OF SYSTEMS  See HPI; all other ROS were reviewed and negative for compliant      ALLERGIES AND MEDICATIONS     ALLERGIES  No Known Allergies    MEDICATIONS: She has a current medication list which includes the following prescription(s): albuterol - Inhale 2 puffs every 4 hours if needed for wheezing and cholecalciferol - Take 1 tablet (2,000 Units) by mouth once daily.    PAST MEDICAL HISTORY : She  has no past medical history on file.    PAST SURGICAL HISTORY: She  has a past surgical history that includes Dental surgery.     FAMILY HISTORY: No changes since previous visit.  "Otherwise non-contributory as charted.     SOCIAL HISTORY  She  reports that she has never smoked. She has never been exposed to tobacco smoke. She has never used smokeless tobacco. She reports current alcohol use. She reports that she does not use drugs.       PHYSICAL EXAM     VITAL SIGNS: /65   Pulse 64   Resp 16   Ht 1.651 m (5' 5\")   Wt 56.2 kg (124 lb)   SpO2 100%   BMI 20.63 kg/m²        PREVIOUS WEIGHTS:  Wt Readings from Last 3 Encounters:   09/24/24 56.2 kg (124 lb)   08/20/24 55.3 kg (122 lb)   06/04/24 56.8 kg (125 lb 3.2 oz)       Constitutional: Alert and oriented, cooperative, no acute distress  Head: Normocephalic, atraumatic   Cranial Features: No abnormal craniofacial features  Neck: Supple. Trachea midline.  Pulmonary: Non-labored breathing, speaks in full sentences. No cough.    Cardiac: regular rate   Extremities: No clubbing, no edema  Neuromuscular: Cranial nerves grossly intact, no focal deficits      RESULTS/DATA     Bicarbonate (mmol/L)   Date Value   05/20/2024 25   01/22/2024 27   10/26/2023 30     Iron (ug/dL)   Date Value   05/20/2024 76   08/19/2022 102     % Saturation (%)   Date Value   05/20/2024 22 (L)     Iron Saturation (%)   Date Value   08/19/2022 30     TIBC (ug/dL)   Date Value   05/20/2024 346   08/19/2022 345     Ferritin   Date Value   05/20/2024 37 ng/mL   08/19/2022 32 ug/L       PAP Adherence  Not applicable      ASSESSMENT/PLAN     Ms. Chacko is a 24 y.o. female and she returns in followup to the [facility] for Narcolepsy.    Problem List, Orders, Assessment, Recommendations:  Problem List Items Addressed This Visit             ICD-10-CM    Fatigue R53.83     -Has struggled with anorexia nervosa --> states currently this ok contolled but probably still eats less than she should/ sees a therapist and has completed Ilda Program/IOP and has worked with dieticians  -may have some underlying depression          Narcolepsy without " cataplexy ([facility]) - Primary G47.419     Narcolepsy with OUT Cataplexy   -continue to encourage a minimum of 8 hours of sleep per night  -not often able to get strategic nap in  -discussed options for medication: Wake Promoting/Stimulant, Sodium Oxybate, Wakix-- concern about appetite supression; also need to monitor depression closely with Sodium Oxybate; Wakix may be a better option for her  -try to get sunlight when feeling sleepy, 15-20 min strategic nap x1-2 per day if able  -reviewed medication options in detail; she is going to think about options nd get back to me; information about each was sent home with her            Disposition    Return to clinic in 3 months

## 2024-09-24 NOTE — PATIENT INSTRUCTIONS
[facility]  [facility] [address]  [address] [facility] [address]       NAME: Whit Chacko   DATE: 09/24/24    Your Sleep Provider Today: Emy Faulkner PA-C  Your Primary Care Physician: Giovana Pulido, DO   Your Referring Provider: No ref. provider found    DIAGNOSIS:   No diagnosis found.    Thank you for coming to the Sleep Medicine Clinic today! Your sleep medicine provider today was: Emy Faulkner PA-C Below is a summary of your treatment plan, other important information, and our contact numbers:      TREATMENT PLAN   Medications we discussed today:    Xywav - night time medication  Wakix- day time   Modafinil/Armodafinil or other wake promoting/stimulant medication       Follow up 4 months       IMPORTANT INFORMATION     Call 911 for medical emergencies.  Our offices are generally open from Monday-Friday, 9 am - 5 pm.  If you need to get in touch with me, you may either call me and my team(number is below) or you can use [employer].  If a referral for a test, for CPAP, or for another specialist was made, and you have not heard about scheduling this within a week, please call scheduling at [phone] ([phone]).  If you are unable to make your appointment for clinic or an overnight study, kindly call the office at least 48 hours in advance to cancel and reschedule.  If you are on CPAP, please bring your device's card or the device to each clinic appointment.   There are no supporting services by either the sleep doctors or their staff on weekends and Holidays, or after 5 PM on weekdays.   If you have been asked to come to a sleep study, make sure you bring toiletries, a comfy pillow, and any nighttime medications that you may regularly take. Also be sure to eat dinner before you arrive. We generally do not provide meals.      PRESCRIPTIONS     We require 7 days advanced notice for prescription refills. If we do not receive the request in this  time, we cannot guarantee that your medication will be refilled in time.      IMPORTANT PHONE NUMBERS     Sleep Medicine Clinic Fax: [phone]  Appointments (for Adult Sleep Clinic): [phone] - option 2  Appointments (For Sleep Studies): [phone] - option 3  [employer] (DME): [phone]  Behavioral Sleep Medicine: [phone]  Sleep Surgery: [phone]  ENT (Otolaryngology): [phone]  Headache Clinic (Neurology): [phone]  Neurology: [phone]  Psychiatry: [phone]  Pulmonary Function Testing (PFT) Center: [phone]  Pulmonary Medicine: [phone]    [employer] (DME): [phone]  [employer] (DME): [phone]  [facility] ([facility]): [phone]      OUR ADULT SLEEP MEDICINE TEAM   Please do not hesitate to call the office or sleep nurse with any questions between appointments:    Adult Sleep Nurses (Ilda Neal, JEAN and Haley Quinones RN):  For clinical questions and refilling prescriptions: [phone]  Email sleep diaries and other documents at: [email or web address]    Adult Sleep Medicine Secretaries:  Lucille Gardner (For Candace/Venegas/Krise/Strohl/Yeh/Milner):   P: [phone]  F: [phone]  Ana Almonte (For Mueller/Guggenbiller): P: [phone]  Fax: [phone]  Trudy Espinoza (For Jurcevic/Blank): P: [phone]  F: [phone]  Lluvia Cantor (For Ashley): P: [phone]  F: [phone]  Gabby Phillips (For Lucrecia/Darvin/Zakhary): P: [phone]  F: [phone]  Janette Dick (For Mcmanus/Laird): P: [phone]  F: [phone]     Adult Sleep Medicine Advanced Practice Providers:  Timmy Dumont ([place], [place])  Obdulia Boston ([facility])  Kathia Powers CNP ([facility])  Wendy Faulkner CNP ([place], [place], [place])  Rosette Laird CNP ([facility])        OUR SLEEP TESTING LOCATIONS     Our team will contact you to schedule your sleep  "study, however, you can contact us as follow:  Main Phone Line (scheduling only): [phone]), option 3  Adult and Pediatric Locations   [place] (6 years and older): [facility] - 4th floor ([address] [facility], [facility]) After hours line: [phone]  [facility] (Main campus: All ages): [facility], 6th floor. After hours line: [phone]   [place] (5 years and older; younger considered on case-by-case basis): [address]; [address], [address]. Scheduling  After hours line: [phone]   [place] (6 years and older): [address]; [address]; [address]   [place] (6 years and older): [address] [facility], [address]  After hours line: [phone]   [place] (13 years and older) in [place]: [address], 2nd floor  After hours line: [phone]   [place] (13 year and older): [address]  After hours line: [phone]     Adult Only Locations:   [place] (18 years and older): [address] [facility], 2nd floor   [place] (18 years and older): [address] [facility]; 4th floor  After hours line: [phone]   [place] (18 years and older) at [place]: [address] [facility]  After hours line: [phone]          CONTACTING YOUR SLEEP MEDICINE PROVIDER     Send a message directly to your provider through \"My Chart\", which is the email service through your  Records Account: [email or web address]   Call [phone] and leave a message. One of the administrative assistants will forward the message to your sleep medicine provider through \"My Chart\" and/or email.     Your sleep medicine provider for this visit was: Emy Faulkner PA-C    "

## 2024-09-25 NOTE — ASSESSMENT & PLAN NOTE
Narcolepsy with OUT Cataplexy   -continue to encourage a minimum of 8 hours of sleep per night  -not often able to get strategic nap in  -discussed options for medication: Wake Promoting/Stimulant, Sodium Oxybate, Wakix-- concern about appetite supression; also need to monitor depression closely with Sodium Oxybate; Wakix may be a better option for her  -try to get sunlight when feeling sleepy, 15-20 min strategic nap x1-2 per day if able  -reviewed medication options in detail; she is going to think about options nd get back to me; information about each was sent home with her

## 2024-09-25 NOTE — ASSESSMENT & PLAN NOTE
-Has struggled with anorexia nervosa --> states currently this ok contolled but probably still eats less than she should/ sees a therapist and has completed Ilda Program/IOP and has worked with dieticians  -may have some underlying depression

## 2024-10-01 ENCOUNTER — APPOINTMENT (OUTPATIENT)
Dept: PRIMARY CARE | Facility: CLINIC | Age: 24
End: 2024-10-01
Payer: COMMERCIAL

## 2024-10-08 ENCOUNTER — APPOINTMENT (OUTPATIENT)
Dept: PRIMARY CARE | Facility: CLINIC | Age: 24
End: 2024-10-08
Payer: COMMERCIAL

## 2024-10-08 VITALS
BODY MASS INDEX: 20.4 KG/M2 | SYSTOLIC BLOOD PRESSURE: 104 MMHG | WEIGHT: 122.6 LBS | DIASTOLIC BLOOD PRESSURE: 62 MMHG | HEART RATE: 64 BPM

## 2024-10-08 DIAGNOSIS — Z23 NEED FOR VACCINATION: ICD-10-CM

## 2024-10-08 DIAGNOSIS — G47.419 NARCOLEPSY WITHOUT CATAPLEXY (HHS-HCC): Primary | ICD-10-CM

## 2024-10-08 DIAGNOSIS — Z00.00 HEALTHCARE MAINTENANCE: ICD-10-CM

## 2024-10-08 PROCEDURE — 90656 IIV3 VACC NO PRSV 0.5 ML IM: CPT | Performed by: FAMILY MEDICINE

## 2024-10-08 PROCEDURE — 99214 OFFICE O/P EST MOD 30 MIN: CPT | Performed by: FAMILY MEDICINE

## 2024-10-08 PROCEDURE — 1036F TOBACCO NON-USER: CPT | Performed by: FAMILY MEDICINE

## 2024-10-08 PROCEDURE — 90471 IMMUNIZATION ADMIN: CPT | Performed by: FAMILY MEDICINE

## 2024-10-08 ASSESSMENT — ENCOUNTER SYMPTOMS
BACK PAIN: 0
CHOKING: 0
COUGH: 0
HEADACHES: 0
FATIGUE: 0
PALPITATIONS: 0
COLOR CHANGE: 0
LIGHT-HEADEDNESS: 0
FACIAL ASYMMETRY: 0
APPETITE CHANGE: 0
ARTHRALGIAS: 0
DIZZINESS: 0
ACTIVITY CHANGE: 0
FEVER: 0
CHEST TIGHTNESS: 0

## 2024-10-08 NOTE — PROGRESS NOTES
Subjective   Patient ID: Whit Chacko is a 24 y.o. female who presents for Follow-up (Sleep medicine ).    HPI   Patient reports that she is here to follow-up from sleep medicine visit.  Patient was diagnosed with narcolepsy type II.  Patient was encouraged to get a minimum of 8 hours of overnight sleep and also encouraged to have a afternoon nap if possible for 15 to 20 minutes.  They recommended modafinil but reports that she did want a hold off on this medication until she could extend her sleep.  Review of Systems   Constitutional:  Negative for activity change, appetite change, fatigue and fever.   HENT:  Negative for congestion.    Respiratory:  Negative for cough, choking and chest tightness.    Cardiovascular:  Negative for chest pain, palpitations and leg swelling.   Musculoskeletal:  Negative for arthralgias, back pain and gait problem.   Skin:  Negative for color change and pallor.   Neurological:  Negative for dizziness, facial asymmetry, light-headedness and headaches.       Objective   /62 (BP Location: Left arm, Patient Position: Sitting)   Pulse 64   Wt 55.6 kg (122 lb 9.6 oz)   BMI 20.40 kg/m²   BSA Body surface area is 1.6 meters squared.      Physical Exam  Constitutional:       General: She is not in acute distress.     Appearance: Normal appearance. She is not toxic-appearing.   HENT:      Head: Normocephalic.      Right Ear: Tympanic membrane, ear canal and external ear normal.      Left Ear: Tympanic membrane, ear canal and external ear normal.   Eyes:      Conjunctiva/sclera: Conjunctivae normal.      Pupils: Pupils are equal, round, and reactive to light.   Cardiovascular:      Rate and Rhythm: Normal rate and regular rhythm.      Pulses: Normal pulses.      Heart sounds: Normal heart sounds.   Pulmonary:      Effort: No respiratory distress.      Breath sounds: No wheezing, rhonchi or rales.   Musculoskeletal:         General: No swelling or tenderness.   Skin:     Findings: No  lesion or rash.   Neurological:      General: No focal deficit present.      Mental Status: She is alert and oriented to person, place, and time. Mental status is at baseline.      Gait: Gait normal.   Psychiatric:         Mood and Affect: Mood normal.         Behavior: Behavior normal.         Thought Content: Thought content normal.         Judgment: Judgment normal.       Office Visit on 06/04/2024   Component Date Value Ref Range Status    Case Report 06/04/2024    Final                    Value:Gynecologic Cytology                              Case: [id number]                                   Authorizing Provider:  Giovana Pulido DO         Collected:           06/04/2024 1545              Ordering Location:     [facility]    Received:            06/04/2024 1545              First Screen:          Maribel Dhaliwal, CT                                                              Rescreen:              BESSY Ward                                                            Specimen:    ThinPrep Liquid-Based Pap-Imaging System Screen, ECTO/ENDOCERVIX/VAGINA, SCREENING         Final Cytological Interpretation 06/04/2024    Final                    Value:                                                    A. THINPREP PAP ECTO/ENDOCERVIX/VAGINA, SCREENING -                                                     Specimen Adequacy                          Satisfactory for evaluation; endocervical/transformation zone component is                           present                                                    General Categorization                          Negative for intraepithelial lesion or malignancy.                                                    Descriptive Interpretation                          Negative for intraepithelial lesion or malignancy                                                                                    06/04/2024    Final                    Value:Slide(s) initially  screened by BESSY HUERTA at [facility]                           [id number] [facility] [id number]                          QC review performed by BESSY Ward at [facility] [facility] [id number]                          By the signature on this report, the individual or group listed as making                           the Final Interpretation/Diagnosis certifies that they have reviewed this                           case.     ThinPrep Imaging System 06/04/2024    Final                    Value:This specimen has been analyzed by the Jaco SolarsiPrep Imaging System ([employer] an automated imaging and review system, which assists the                           laboratory in evaluating cells on ThinPrep Pap tests. Following automated                           imaging, selected fields from every slide were reviewed by a                           cytotechnologist and/or pathologist.                              Educational Note 06/04/2024    Final                    Value:Cervical cytology is a screening procedure primarily for squamous cancers                           and precursors and has associated false-negative and false-positives                           results as evidenced by published data. Your patient's test should be                           interpreted in this context, together with the patient's history and                           clinical findings. Regular sampling and follow-up of unexplained clinical                           signs and symptoms are recommended to minimize false negative results.    Perform HPV HR test? 06/04/2024 Always (all interpretations)   Final    Include HPV Genotype? 06/04/2024 Yes   Final    HPV, high-risk 06/04/2024 Negative  Negative Final    HPV Type 16 DNA 06/04/2024 Negative  Negative Final    HPV Type 18 DNA  06/04/2024 Negative  Negative Final    HPV non-Type 16 or 18 DNA 06/04/2024 Negative  Negative Final   Lab on 05/20/2024   Component Date Value Ref Range Status    WBC 05/20/2024 5.9  4.4 - 11.3 x10*3/uL Final    nRBC 05/20/2024 0.0  0.0 - 0.0 /100 WBCs Final    RBC 05/20/2024 4.34  4.00 - 5.20 x10*6/uL Final    Hemoglobin 05/20/2024 13.2  12.0 - 16.0 g/dL Final    Hematocrit 05/20/2024 39.6  36.0 - 46.0 % Final    MCV 05/20/2024 91  80 - 100 fL Final    MCH 05/20/2024 30.4  26.0 - 34.0 pg Final    MCHC 05/20/2024 33.3  32.0 - 36.0 g/dL Final    RDW 05/20/2024 11.9  11.5 - 14.5 % Final    Platelets 05/20/2024 248  150 - 450 x10*3/uL Final    Neutrophils % 05/20/2024 58.6  40.0 - 80.0 % Final    Immature Granulocytes %, Automated 05/20/2024 0.2  0.0 - 0.9 % Final    Immature Granulocyte Count (IG) includes promyelocytes, myelocytes and metamyelocytes but does not include bands. Percent differential counts (%) should be interpreted in the context of the absolute cell counts (cells/UL).    Lymphocytes % 05/20/2024 34.3  13.0 - 44.0 % Final    Monocytes % 05/20/2024 5.7  2.0 - 10.0 % Final    Eosinophils % 05/20/2024 0.7  0.0 - 6.0 % Final    Basophils % 05/20/2024 0.5  0.0 - 2.0 % Final    Neutrophils Absolute 05/20/2024 3.48  1.20 - 7.70 x10*3/uL Final    Percent differential counts (%) should be interpreted in the context of the absolute cell counts (cells/uL).    Immature Granulocytes Absolute, Au* 05/20/2024 0.01  0.00 - 0.70 x10*3/uL Final    Lymphocytes Absolute 05/20/2024 2.04  1.20 - 4.80 x10*3/uL Final    Monocytes Absolute 05/20/2024 0.34  0.10 - 1.00 x10*3/uL Final    Eosinophils Absolute 05/20/2024 0.04  0.00 - 0.70 x10*3/uL Final    Basophils Absolute 05/20/2024 0.03  0.00 - 0.10 x10*3/uL Final    Glucose 05/20/2024 91  74 - 99 mg/dL Final    Sodium 05/20/2024 139  136 - 145 mmol/L Final    Potassium 05/20/2024 4.4  3.5 - 5.3 mmol/L Final    Chloride 05/20/2024 103  98 - 107 mmol/L Final    Bicarbonate  05/20/2024 25  21 - 32 mmol/L Final    Anion Gap 05/20/2024 15  10 - 20 mmol/L Final    Urea Nitrogen 05/20/2024 12  6 - 23 mg/dL Final    Creatinine 05/20/2024 0.72  0.50 - 1.05 mg/dL Final    eGFR 05/20/2024 >90  >60 mL/min/1.73m*2 Final    Calculations of estimated GFR are performed using the 2021 CKD-EPI Study Refit equation without the race variable for the IDMS-Traceable creatinine methods.  https://jasn.asnjournals.org/content/early/2021/09/22/ASN.1738436585    Calcium 05/20/2024 9.5  8.6 - 10.6 mg/dL Final    Albumin 05/20/2024 4.8  3.4 - 5.0 g/dL Final    Alkaline Phosphatase 05/20/2024 33  33 - 110 U/L Final    Total Protein 05/20/2024 6.9  6.4 - 8.2 g/dL Final    AST 05/20/2024 13  9 - 39 U/L Final    Bilirubin, Total 05/20/2024 0.6  0.0 - 1.2 mg/dL Final    ALT 05/20/2024 7  7 - 45 U/L Final    Patients treated with Sulfasalazine may generate falsely decreased results for ALT.    Cholesterol 05/20/2024 151  0 - 199 mg/dL Final          Age      Desirable   Borderline High   High     0-19 Y     0 - 169       170 - 199     >/= 200    20-24 Y     0 - 189       190 - 224     >/= 225         >24 Y     0 - 199       200 - 239     >/= 240   **All ranges are based on fasting samples. Specific   therapeutic targets will vary based on patient-specific   cardiac risk.    Pediatric guidelines reference:Pediatrics 2011, 128(S5).Adult guidelines reference: NCEP ATPIII Guidelines,ALMA ROSA 2001, 258:2486-97    Venipuncture immediately after or during the administration of Metamizole may lead to falsely low results. Testing should be performed immediately prior to Metamizole dosing.    HDL-Cholesterol 05/20/2024 57.1  mg/dL Final      Age       Very Low   Low     Normal    High    0-19 Y    < 35      < 40     40-45     ----  20-24 Y    ----     < 40      >45      ----        >24 Y      ----     < 40     40-60      >60      Cholesterol/HDL Ratio 05/20/2024 2.6   Final      Ref Values  Desirable  < 3.4  High Risk  > 5.0    LDL  Calculated 05/20/2024 89  <=119 mg/dL Final                                Near   Borderline      AGE      Desirable  Optimal    High     High     Very High     0-19 Y     0 - 109     ---    110-129   >/= 130     ----    20-24 Y     0 - 119     ---    120-159   >/= 160     ----      >24 Y     0 -  99   100-129  130-159   160-189     >/=190      VLDL 05/20/2024 5  0 - 40 mg/dL Final    Triglycerides 05/20/2024 27  0 - 149 mg/dL Final       Age         Desirable   Borderline High   High     Very High   0 D-90 D    19 - 174         ----         ----        ----  91 D- 9 Y     0 -  74        75 -  99     >/= 100      ----    10-19 Y     0 -  89        90 - 129     >/= 130      ----    20-24 Y     0 - 114       115 - 149     >/= 150      ----         >24 Y     0 - 149       150 - 199    200- 499    >/= 500    Venipuncture immediately after or during the administration of Metamizole may lead to falsely low results. Testing should be performed immediately prior to Metamizole dosing.    Non HDL Cholesterol 05/20/2024 94  0 - 149 mg/dL Final          Age       Desirable   Borderline High   High     Very High     0-19 Y     0 - 119       120 - 144     >/= 145    >/= 160    20-24 Y     0 - 149       150 - 189     >/= 190      ----         >24 Y    30 mg/dL above LDL Cholesterol goal      Thyroid Stimulating Hormone 05/20/2024 0.56  0.44 - 3.98 mIU/L Final    Vitamin D, 25-Hydroxy, Total 05/20/2024 31  30 - 100 ng/mL Final    Iron 05/20/2024 76  35 - 150 ug/dL Final    UIBC 05/20/2024 270  110 - 370 ug/dL Final    TIBC 05/20/2024 346  240 - 445 ug/dL Final    % Saturation 05/20/2024 22 (L)  25 - 45 % Final    Ferritin 05/20/2024 37  8 - 150 ng/mL Final   Lab on 01/22/2024   Component Date Value Ref Range Status    Glucose 01/22/2024 100 (H)  74 - 99 mg/dL Final    Sodium 01/22/2024 141  136 - 145 mmol/L Final    Potassium 01/22/2024 4.4  3.5 - 5.3 mmol/L Final    Chloride 01/22/2024 105  98 - 107 mmol/L Final    Bicarbonate  01/22/2024 27  21 - 32 mmol/L Final    Anion Gap 01/22/2024 13  10 - 20 mmol/L Final    Urea Nitrogen 01/22/2024 15  6 - 23 mg/dL Final    Creatinine 01/22/2024 0.82  0.50 - 1.05 mg/dL Final    eGFR 01/22/2024 >90  >60 mL/min/1.73m*2 Final    Calculations of estimated GFR are performed using the 2021 CKD-EPI Study Refit equation without the race variable for the IDMS-Traceable creatinine methods.  https://jasn.asnjournals.org/content/early/2021/09/22/ASN.4786103629    Calcium 01/22/2024 9.8  8.6 - 10.6 mg/dL Final    Albumin 01/22/2024 4.6  3.4 - 5.0 g/dL Final    Alkaline Phosphatase 01/22/2024 38  33 - 110 U/L Final    Total Protein 01/22/2024 7.0  6.4 - 8.2 g/dL Final    AST 01/22/2024 14  9 - 39 U/L Final    Bilirubin, Total 01/22/2024 0.7  0.0 - 1.2 mg/dL Final    ALT 01/22/2024 8  7 - 45 U/L Final    Patients treated with Sulfasalazine may generate falsely decreased results for ALT.   Lab on 10/26/2023   Component Date Value Ref Range Status    WBC 10/26/2023 6.7  4.4 - 11.3 x10*3/uL Final    nRBC 10/26/2023 0.0  0.0 - 0.0 /100 WBCs Final    RBC 10/26/2023 4.42  4.00 - 5.20 x10*6/uL Final    Hemoglobin 10/26/2023 13.6  12.0 - 16.0 g/dL Final    Hematocrit 10/26/2023 41.1  36.0 - 46.0 % Final    MCV 10/26/2023 93  80 - 100 fL Final    MCH 10/26/2023 30.8  26.0 - 34.0 pg Final    MCHC 10/26/2023 33.1  32.0 - 36.0 g/dL Final    RDW 10/26/2023 11.9  11.5 - 14.5 % Final    Platelets 10/26/2023 231  150 - 450 x10*3/uL Final    MPV 10/26/2023 9.7  7.5 - 11.5 fL Final    Neutrophils % 10/26/2023 49.8  40.0 - 80.0 % Final    Immature Granulocytes %, Automated 10/26/2023 0.1  0.0 - 0.9 % Final    Immature Granulocyte Count (IG) includes promyelocytes, myelocytes and metamyelocytes but does not include bands. Percent differential counts (%) should be interpreted in the context of the absolute cell counts (cells/UL).    Lymphocytes % 10/26/2023 41.6  13.0 - 44.0 % Final    Monocytes % 10/26/2023 6.7  2.0 - 10.0 % Final     Eosinophils % 10/26/2023 1.2  0.0 - 6.0 % Final    Basophils % 10/26/2023 0.6  0.0 - 2.0 % Final    Neutrophils Absolute 10/26/2023 3.33  1.20 - 7.70 x10*3/uL Final    Percent differential counts (%) should be interpreted in the context of the absolute cell counts (cells/uL).    Immature Granulocytes Absolute, Au* 10/26/2023 0.01  0.00 - 0.70 x10*3/uL Final    Lymphocytes Absolute 10/26/2023 2.78  1.20 - 4.80 x10*3/uL Final    Monocytes Absolute 10/26/2023 0.45  0.10 - 1.00 x10*3/uL Final    Eosinophils Absolute 10/26/2023 0.08  0.00 - 0.70 x10*3/uL Final    Basophils Absolute 10/26/2023 0.04  0.00 - 0.10 x10*3/uL Final    Glucose 10/26/2023 84  74 - 99 mg/dL Final    Sodium 10/26/2023 136  136 - 145 mmol/L Final    Potassium 10/26/2023 4.0  3.5 - 5.3 mmol/L Final    Chloride 10/26/2023 102  98 - 107 mmol/L Final    Bicarbonate 10/26/2023 30  21 - 32 mmol/L Final    Anion Gap 10/26/2023 8 (L)  10 - 20 mmol/L Final    Urea Nitrogen 10/26/2023 13  6 - 23 mg/dL Final    Creatinine 10/26/2023 0.74  0.50 - 1.05 mg/dL Final    eGFR 10/26/2023 >90  >60 mL/min/1.73m*2 Final    Calculations of estimated GFR are performed using the 2021 CKD-EPI Study Refit equation without the race variable for the IDMS-Traceable creatinine methods.  https://jasn.asnjournals.org/content/early/2021/09/22/ASN.3099007707    Calcium 10/26/2023 9.5  8.6 - 10.3 mg/dL Final    Albumin 10/26/2023 5.0  3.4 - 5.0 g/dL Final    Alkaline Phosphatase 10/26/2023 33  33 - 110 U/L Final    Total Protein 10/26/2023 7.2  6.4 - 8.2 g/dL Final    AST 10/26/2023 14  9 - 39 U/L Final    Bilirubin, Total 10/26/2023 0.8  0.0 - 1.2 mg/dL Final    ALT 10/26/2023 8  7 - 45 U/L Final    Patients treated with Sulfasalazine may generate falsely decreased results for ALT.    Clam IgE 10/26/2023 <0.10  <0.10 kU/L Final    Fish (Cod) IgE 10/26/2023 <0.10  <0.10 kU/L Final    [place], [place] IgE 10/26/2023 <0.10   Final    Egg White IgE 10/26/2023 <0.10  <0.10 kU/L Final    Cow's  Milk IgE 10/26/2023 <0.10  <0.10 kU/L Final    Peanut IgE 10/26/2023 <0.10  <0.10 kU/L Final    Scallop IgE 10/26/2023 <0.10  <0.10 kU/L Final    Sesame Seed IgE 10/26/2023 <0.10  <0.10 kU/L Final    Shrimp IgE 10/26/2023 <0.10  <0.10 kU/L Final    Soybean IgE 10/26/2023 <0.10  <0.10 kU/L Final    [place] IgE 10/26/2023 <0.10  <0.10 kU/L Final    Wheat IgE 10/26/2023 <0.10  <0.10 kU/L Final    Immunocap IgE 10/26/2023 18.7  <=214 KU/L Final    Note: Omalizumab (Xolair, Adhysteria; humanized  IgG1 antihuman IgE Fc) treatment does not  significantly interfere with the accuracy of  total IgE on the ImmunoCAP ([employer]) platform.  J Allergy Clin Immunol 2006;117:759-66).  Allergens, parasitic diseases, smoking, and  alcohol consumption have been reported to  increase levels of total IgE in serum.    Berm[place] Grass IgE 10/26/2023 <0.10  <0.10 kU/L Final    [place]ago Grass IgE 10/26/2023 <0.10  <0.10 kU/L Final    [place] Grass, [place] Blue IgE 10/26/2023 0.61 (Low)  <0.10 kU/L Final    Jay Grass IgE 10/26/2023 0.57 (Low)  <0.10 kU/L Final    [place], [place] IgE 10/26/2023 <0.10  <0.10 kU/L Final    Common Pigweed IgE 10/26/2023 <0.10  <0.10 kU/L Final    Common Ragweed IgE 10/26/2023 <0.10  <0.10 kU/L Final    White [place] IgE 10/26/2023 <0.10  <0.10 kU/L Final    Common Silver Birch IgE 10/26/2023 <0.10  <0.10 kU/L Final    Box-Elder IgE 10/26/2023 <0.10  <0.10 kU/L Final    Mountain Juniper IgE 10/26/2023 <0.10  <0.10 Final    [place] IgE 10/26/2023 <0.10  <0.10 kU/L Final    Elm IgE 10/26/2023 <0.10  <0.10 kU/L Final    [place] IgE 10/26/2023 <0.10  <0.10 Final    Pecan, [place] IgE 10/26/2023 0.77 (Mod)  <0.10 Final    Maple Mustang [place], [place] * 10/26/2023 <0.10  <0.10 Final    [place] Tree IgE 10/26/2023 0.35 (Low)  <0.10 Final    Russian Thistle IgE 10/26/2023 <0.10  <0.10 kU/L Final    Sheep [place] IgE 10/26/2023 <0.10  <0.10 kU/L Final    Cat Dander IgE 10/26/2023 <0.10  <0.10 kU/L Final    Dog  Dander IgE 10/26/2023 <0.10  <0.10 kU/L Final    Alternaria Alternata IgE 10/26/2023 <0.10  <0.10 kU/L Final    Cladosporium Herbarum IgE 10/26/2023 <0.10  <0.10 kU/L Final    English Plantain IgE 10/26/2023 <0.10  <0.10 Final    Dust Mite (D. farinae) IgE 10/26/2023 1.00 (Mod)  <0.10 kU/L Final    Dust Mite (D. pteronyssinus) IgE 10/26/2023 0.74 (Mod)  <0.10 kU/L Final    [place] Cockroach IgE 10/26/2023 <0.10  <0.10 kU/L Final    Aspergillus Fumigatus IgE 10/26/2023 <0.10  <0.10 kU/L Final    [place] IgE 10/26/2023 <0.10  <0.10 kU/L Final    Penicillium Chrysogenum IgE 10/26/2023 <0.10  <0.10 kU/L Final     Current Outpatient Medications on File Prior to Visit   Medication Sig Dispense Refill    albuterol 90 mcg/actuation inhaler Inhale 2 puffs every 4 hours if needed for wheezing. 18 g 1    cholecalciferol (Vitamin D-3) 50 MCG (2000 UT) tablet Take 1 tablet (2,000 Units) by mouth once daily.       No current facility-administered medications on file prior to visit.     No images are attached to the encounter.            Assessment/Plan   Diagnoses and all orders for this visit:  Narcolepsy without cataplexy ([facility])  Healthcare maintenance  -     Flu vaccine, trivalent, preservative free, age 6 months and greater (Fluarix/Fluzone/Flulaval)    1.  Patient to use happy light from nighttime to bedtime  2.  Patient to get additional blood work done at next office visit  3.  Will hold off on stimulant medication at this time  4.  Patient call for questions or concerns

## 2024-11-19 ENCOUNTER — APPOINTMENT (OUTPATIENT)
Dept: PRIMARY CARE | Facility: CLINIC | Age: 24
End: 2024-11-19
Payer: COMMERCIAL

## 2024-11-19 ENCOUNTER — LAB (OUTPATIENT)
Dept: LAB | Facility: LAB | Age: 24
End: 2024-11-19
Payer: COMMERCIAL

## 2024-11-19 VITALS
SYSTOLIC BLOOD PRESSURE: 104 MMHG | WEIGHT: 123.6 LBS | HEART RATE: 59 BPM | DIASTOLIC BLOOD PRESSURE: 68 MMHG | BODY MASS INDEX: 20.57 KG/M2

## 2024-11-19 DIAGNOSIS — F50.00 ANOREXIA NERVOSA: ICD-10-CM

## 2024-11-19 DIAGNOSIS — L98.9 CHANGING SKIN LESION: ICD-10-CM

## 2024-11-19 DIAGNOSIS — F41.9 ANXIETY: ICD-10-CM

## 2024-11-19 DIAGNOSIS — F32.9 REACTIVE DEPRESSION: ICD-10-CM

## 2024-11-19 DIAGNOSIS — G47.419 NARCOLEPSY WITHOUT CATAPLEXY (HHS-HCC): Primary | ICD-10-CM

## 2024-11-19 DIAGNOSIS — G47.419 NARCOLEPSY WITHOUT CATAPLEXY (HHS-HCC): ICD-10-CM

## 2024-11-19 PROCEDURE — 80053 COMPREHEN METABOLIC PANEL: CPT

## 2024-11-19 PROCEDURE — 82728 ASSAY OF FERRITIN: CPT

## 2024-11-19 PROCEDURE — 82670 ASSAY OF TOTAL ESTRADIOL: CPT

## 2024-11-19 PROCEDURE — 83001 ASSAY OF GONADOTROPIN (FSH): CPT

## 2024-11-19 PROCEDURE — 83540 ASSAY OF IRON: CPT

## 2024-11-19 PROCEDURE — 84443 ASSAY THYROID STIM HORMONE: CPT

## 2024-11-19 PROCEDURE — 1036F TOBACCO NON-USER: CPT | Performed by: FAMILY MEDICINE

## 2024-11-19 PROCEDURE — 99214 OFFICE O/P EST MOD 30 MIN: CPT | Performed by: FAMILY MEDICINE

## 2024-11-19 PROCEDURE — 36415 COLL VENOUS BLD VENIPUNCTURE: CPT

## 2024-11-19 PROCEDURE — 83002 ASSAY OF GONADOTROPIN (LH): CPT

## 2024-11-19 PROCEDURE — 85025 COMPLETE CBC W/AUTO DIFF WBC: CPT

## 2024-11-19 PROCEDURE — 83550 IRON BINDING TEST: CPT

## 2024-11-19 ASSESSMENT — ENCOUNTER SYMPTOMS
PALPITATIONS: 0
CHOKING: 0
LIGHT-HEADEDNESS: 0
FATIGUE: 0
COUGH: 0
BACK PAIN: 0
COLOR CHANGE: 0
CHEST TIGHTNESS: 0
FACIAL ASYMMETRY: 0
FEVER: 0
ACTIVITY CHANGE: 0
ARTHRALGIAS: 0
APPETITE CHANGE: 0
DIZZINESS: 0
HEADACHES: 0

## 2024-11-19 NOTE — PROGRESS NOTES
Subjective   Patient ID: Whit Chacko is a 24 y.o. female who presents for Follow-up (Sleep medication ).    HPI   Patient reports that she is here to follow-up from sleep medicine visit.  Patient was diagnosed with narcolepsy type II.  Patient was encouraged to get a minimum of 8 hours of overnight sleep and also encouraged to have a afternoon nap if possible for 15 to 20 minutes.  They recommended modafinil but reports that she did want a hold off on this medication until she could extend her sleep.    Review of Systems   Constitutional:  Negative for activity change, appetite change, fatigue and fever.   HENT:  Negative for congestion.    Respiratory:  Negative for cough, choking and chest tightness.    Cardiovascular:  Negative for chest pain, palpitations and leg swelling.   Musculoskeletal:  Negative for arthralgias, back pain and gait problem.   Skin:  Negative for color change and pallor.   Neurological:  Negative for dizziness, facial asymmetry, light-headedness and headaches.       Objective   /68 (BP Location: Left arm, Patient Position: Sitting)   Pulse 59   Wt 56.1 kg (123 lb 9.6 oz)   BMI 20.57 kg/m²   BSA Body surface area is 1.6 meters squared.      Physical Exam  Constitutional:       General: She is not in acute distress.     Appearance: Normal appearance. She is not toxic-appearing.   HENT:      Head: Normocephalic.      Right Ear: Tympanic membrane, ear canal and external ear normal.      Left Ear: Tympanic membrane, ear canal and external ear normal.   Eyes:      Conjunctiva/sclera: Conjunctivae normal.      Pupils: Pupils are equal, round, and reactive to light.   Cardiovascular:      Rate and Rhythm: Normal rate and regular rhythm.      Pulses: Normal pulses.      Heart sounds: Normal heart sounds.   Pulmonary:      Effort: No respiratory distress.      Breath sounds: No wheezing, rhonchi or rales.   Musculoskeletal:         General: No swelling or tenderness.   Skin:     Findings:  No lesion or rash.   Neurological:      General: No focal deficit present.      Mental Status: She is alert and oriented to person, place, and time. Mental status is at baseline.      Gait: Gait normal.   Psychiatric:         Mood and Affect: Mood normal.         Behavior: Behavior normal.         Thought Content: Thought content normal.         Judgment: Judgment normal.       Office Visit on 06/04/2024   Component Date Value Ref Range Status    Case Report 06/04/2024    Final                    Value:Gynecologic Cytology                              Case: [id number]                                   Authorizing Provider:  Giovana Pulido DO         Collected:           06/04/2024 1545              Ordering Location:     [facility]    Received:            06/04/2024 1545              First Screen:          Maribel Dhaliwal, CT                                                              Rescreen:              BESSY Ward                                                            Specimen:    ThinPrep Liquid-Based Pap-Imaging System Screen, ECTO/ENDOCERVIX/VAGINA, SCREENING         Final Cytological Interpretation 06/04/2024    Final                    Value:                                                    A. THINPREP PAP ECTO/ENDOCERVIX/VAGINA, SCREENING -                                                     Specimen Adequacy                          Satisfactory for evaluation; endocervical/transformation zone component is                           present                                                    General Categorization                          Negative for intraepithelial lesion or malignancy.                                                    Descriptive Interpretation                          Negative for intraepithelial lesion or malignancy                                                                                    06/04/2024    Final                    Value:Slide(s) initially  screened by BESSY HUERTA at [facility]                           [id number] [address] [facility] [id number]                          QC review performed by BESSY Ward at [facility] [facility] [id number]                          By the signature on this report, the individual or group listed as making                           the Final Interpretation/Diagnosis certifies that they have reviewed this                           case.     ThinPrep Imaging System 06/04/2024    Final                    Value:This specimen has been analyzed by the [facility]igeonlyPrep Imaging System ([employer] an automated imaging and review system, which assists the                           laboratory in evaluating cells on ThinPrep Pap tests. Following automated                           imaging, selected fields from every slide were reviewed by a                           cytotechnologist and/or pathologist.                              Educational Note 06/04/2024    Final                    Value:Cervical cytology is a screening procedure primarily for squamous cancers                           and precursors and has associated false-negative and false-positives                           results as evidenced by published data. Your patient's test should be                           interpreted in this context, together with the patient's history and                           clinical findings. Regular sampling and follow-up of unexplained clinical                           signs and symptoms are recommended to minimize false negative results.    Perform HPV HR test? 06/04/2024 Always (all interpretations)   Final    Include HPV Genotype? 06/04/2024 Yes   Final    HPV, high-risk 06/04/2024 Negative  Negative Final    HPV Type 16 DNA 06/04/2024 Negative  Negative Final    HPV Type 18 DNA  06/04/2024 Negative  Negative Final    HPV non-Type 16 or 18 DNA 06/04/2024 Negative  Negative Final   Lab on 05/20/2024   Component Date Value Ref Range Status    WBC 05/20/2024 5.9  4.4 - 11.3 x10*3/uL Final    nRBC 05/20/2024 0.0  0.0 - 0.0 /100 WBCs Final    RBC 05/20/2024 4.34  4.00 - 5.20 x10*6/uL Final    Hemoglobin 05/20/2024 13.2  12.0 - 16.0 g/dL Final    Hematocrit 05/20/2024 39.6  36.0 - 46.0 % Final    MCV 05/20/2024 91  80 - 100 fL Final    MCH 05/20/2024 30.4  26.0 - 34.0 pg Final    MCHC 05/20/2024 33.3  32.0 - 36.0 g/dL Final    RDW 05/20/2024 11.9  11.5 - 14.5 % Final    Platelets 05/20/2024 248  150 - 450 x10*3/uL Final    Neutrophils % 05/20/2024 58.6  40.0 - 80.0 % Final    Immature Granulocytes %, Automated 05/20/2024 0.2  0.0 - 0.9 % Final    Immature Granulocyte Count (IG) includes promyelocytes, myelocytes and metamyelocytes but does not include bands. Percent differential counts (%) should be interpreted in the context of the absolute cell counts (cells/UL).    Lymphocytes % 05/20/2024 34.3  13.0 - 44.0 % Final    Monocytes % 05/20/2024 5.7  2.0 - 10.0 % Final    Eosinophils % 05/20/2024 0.7  0.0 - 6.0 % Final    Basophils % 05/20/2024 0.5  0.0 - 2.0 % Final    Neutrophils Absolute 05/20/2024 3.48  1.20 - 7.70 x10*3/uL Final    Percent differential counts (%) should be interpreted in the context of the absolute cell counts (cells/uL).    Immature Granulocytes Absolute, Au* 05/20/2024 0.01  0.00 - 0.70 x10*3/uL Final    Lymphocytes Absolute 05/20/2024 2.04  1.20 - 4.80 x10*3/uL Final    Monocytes Absolute 05/20/2024 0.34  0.10 - 1.00 x10*3/uL Final    Eosinophils Absolute 05/20/2024 0.04  0.00 - 0.70 x10*3/uL Final    Basophils Absolute 05/20/2024 0.03  0.00 - 0.10 x10*3/uL Final    Glucose 05/20/2024 91  74 - 99 mg/dL Final    Sodium 05/20/2024 139  136 - 145 mmol/L Final    Potassium 05/20/2024 4.4  3.5 - 5.3 mmol/L Final    Chloride 05/20/2024 103  98 - 107 mmol/L Final    Bicarbonate  05/20/2024 25  21 - 32 mmol/L Final    Anion Gap 05/20/2024 15  10 - 20 mmol/L Final    Urea Nitrogen 05/20/2024 12  6 - 23 mg/dL Final    Creatinine 05/20/2024 0.72  0.50 - 1.05 mg/dL Final    eGFR 05/20/2024 >90  >60 mL/min/1.73m*2 Final    Calculations of estimated GFR are performed using the 2021 CKD-EPI Study Refit equation without the race variable for the IDMS-Traceable creatinine methods.  https://jasn.asnjournals.org/content/early/2021/09/22/ASN.6971258109    Calcium 05/20/2024 9.5  8.6 - 10.6 mg/dL Final    Albumin 05/20/2024 4.8  3.4 - 5.0 g/dL Final    Alkaline Phosphatase 05/20/2024 33  33 - 110 U/L Final    Total Protein 05/20/2024 6.9  6.4 - 8.2 g/dL Final    AST 05/20/2024 13  9 - 39 U/L Final    Bilirubin, Total 05/20/2024 0.6  0.0 - 1.2 mg/dL Final    ALT 05/20/2024 7  7 - 45 U/L Final    Patients treated with Sulfasalazine may generate falsely decreased results for ALT.    Cholesterol 05/20/2024 151  0 - 199 mg/dL Final          Age      Desirable   Borderline High   High     0-19 Y     0 - 169       170 - 199     >/= 200    20-24 Y     0 - 189       190 - 224     >/= 225         >24 Y     0 - 199       200 - 239     >/= 240   **All ranges are based on fasting samples. Specific   therapeutic targets will vary based on patient-specific   cardiac risk.    Pediatric guidelines reference:Pediatrics 2011, 128(S5).Adult guidelines reference: NCEP ATPIII Guidelines,ALMA ROSA 2001, 258:2486-97    Venipuncture immediately after or during the administration of Metamizole may lead to falsely low results. Testing should be performed immediately prior to Metamizole dosing.    HDL-Cholesterol 05/20/2024 57.1  mg/dL Final      Age       Very Low   Low     Normal    High    0-19 Y    < 35      < 40     40-45     ----  20-24 Y    ----     < 40      >45      ----        >24 Y      ----     < 40     40-60      >60      Cholesterol/HDL Ratio 05/20/2024 2.6   Final      Ref Values  Desirable  < 3.4  High Risk  > 5.0    LDL  Calculated 05/20/2024 89  <=119 mg/dL Final                                Near   Borderline      AGE      Desirable  Optimal    High     High     Very High     0-19 Y     0 - 109     ---    110-129   >/= 130     ----    20-24 Y     0 - 119     ---    120-159   >/= 160     ----      >24 Y     0 -  99   100-129  130-159   160-189     >/=190      VLDL 05/20/2024 5  0 - 40 mg/dL Final    Triglycerides 05/20/2024 27  0 - 149 mg/dL Final       Age         Desirable   Borderline High   High     Very High   0 D-90 D    19 - 174         ----         ----        ----  91 D- 9 Y     0 -  74        75 -  99     >/= 100      ----    10-19 Y     0 -  89        90 - 129     >/= 130      ----    20-24 Y     0 - 114       115 - 149     >/= 150      ----         >24 Y     0 - 149       150 - 199    200- 499    >/= 500    Venipuncture immediately after or during the administration of Metamizole may lead to falsely low results. Testing should be performed immediately prior to Metamizole dosing.    Non HDL Cholesterol 05/20/2024 94  0 - 149 mg/dL Final          Age       Desirable   Borderline High   High     Very High     0-19 Y     0 - 119       120 - 144     >/= 145    >/= 160    20-24 Y     0 - 149       150 - 189     >/= 190      ----         >24 Y    30 mg/dL above LDL Cholesterol goal      Thyroid Stimulating Hormone 05/20/2024 0.56  0.44 - 3.98 mIU/L Final    Vitamin D, 25-Hydroxy, Total 05/20/2024 31  30 - 100 ng/mL Final    Iron 05/20/2024 76  35 - 150 ug/dL Final    UIBC 05/20/2024 270  110 - 370 ug/dL Final    TIBC 05/20/2024 346  240 - 445 ug/dL Final    % Saturation 05/20/2024 22 (L)  25 - 45 % Final    Ferritin 05/20/2024 37  8 - 150 ng/mL Final   Lab on 01/22/2024   Component Date Value Ref Range Status    Glucose 01/22/2024 100 (H)  74 - 99 mg/dL Final    Sodium 01/22/2024 141  136 - 145 mmol/L Final    Potassium 01/22/2024 4.4  3.5 - 5.3 mmol/L Final    Chloride 01/22/2024 105  98 - 107 mmol/L Final    Bicarbonate  01/22/2024 27  21 - 32 mmol/L Final    Anion Gap 01/22/2024 13  10 - 20 mmol/L Final    Urea Nitrogen 01/22/2024 15  6 - 23 mg/dL Final    Creatinine 01/22/2024 0.82  0.50 - 1.05 mg/dL Final    eGFR 01/22/2024 >90  >60 mL/min/1.73m*2 Final    Calculations of estimated GFR are performed using the 2021 CKD-EPI Study Refit equation without the race variable for the IDMS-Traceable creatinine methods.  https://jasn.asnjournals.org/content/early/2021/09/22/ASN.0454190203    Calcium 01/22/2024 9.8  8.6 - 10.6 mg/dL Final    Albumin 01/22/2024 4.6  3.4 - 5.0 g/dL Final    Alkaline Phosphatase 01/22/2024 38  33 - 110 U/L Final    Total Protein 01/22/2024 7.0  6.4 - 8.2 g/dL Final    AST 01/22/2024 14  9 - 39 U/L Final    Bilirubin, Total 01/22/2024 0.7  0.0 - 1.2 mg/dL Final    ALT 01/22/2024 8  7 - 45 U/L Final    Patients treated with Sulfasalazine may generate falsely decreased results for ALT.     Current Outpatient Medications on File Prior to Visit   Medication Sig Dispense Refill    albuterol 90 mcg/actuation inhaler Inhale 2 puffs every 4 hours if needed for wheezing. 18 g 1    cholecalciferol (Vitamin D-3) 50 MCG (2000 UT) tablet Take 1 tablet (2,000 Units) by mouth once daily.       No current facility-administered medications on file prior to visit.     No images are attached to the encounter.            Assessment/Plan   Diagnoses and all orders for this visit:  Narcolepsy without cataplexy (HHS-HCC)  -     Comprehensive metabolic panel; Future  -     Iron and TIBC; Future  -     Ferritin; Future  -     Tsh With Reflex To Free T4 If Abnormal; Future  -     CBC and Auto Differential; Future  -     Luteinizing hormone; Future  -     FSH; Future  -     Estradiol; Future  Anxiety  -     Comprehensive metabolic panel; Future  -     Iron and TIBC; Future  -     Ferritin; Future  -     Tsh With Reflex To Free T4 If Abnormal; Future  -     CBC and Auto Differential; Future  -     Luteinizing hormone; Future  -     FSH;  Future  -     Estradiol; Future  Anorexia nervosa  -     Comprehensive metabolic panel; Future  -     Iron and TIBC; Future  -     Ferritin; Future  -     Tsh With Reflex To Free T4 If Abnormal; Future  -     CBC and Auto Differential; Future  -     Luteinizing hormone; Future  -     FSH; Future  -     Estradiol; Future  Reactive depression      1.  Patient to use happy light from nighttime to bedtime  2.  Patient to get additional blood work done at next office visit  3.  Will hold off on stimulant medication at this time  4.  Patient call for questions or concerns

## 2024-11-20 LAB
ALBUMIN SERPL BCP-MCNC: 5.2 G/DL (ref 3.4–5)
ALP SERPL-CCNC: 35 U/L (ref 33–110)
ALT SERPL W P-5'-P-CCNC: 7 U/L (ref 7–45)
ANION GAP SERPL CALC-SCNC: 14 MMOL/L (ref 10–20)
AST SERPL W P-5'-P-CCNC: 13 U/L (ref 9–39)
BASOPHILS # BLD AUTO: 0.06 X10*3/UL (ref 0–0.1)
BASOPHILS NFR BLD AUTO: 0.8 %
BILIRUB SERPL-MCNC: 0.5 MG/DL (ref 0–1.2)
BUN SERPL-MCNC: 10 MG/DL (ref 6–23)
CALCIUM SERPL-MCNC: 9.9 MG/DL (ref 8.6–10.6)
CHLORIDE SERPL-SCNC: 101 MMOL/L (ref 98–107)
CO2 SERPL-SCNC: 28 MMOL/L (ref 21–32)
CREAT SERPL-MCNC: 0.8 MG/DL (ref 0.5–1.05)
EGFRCR SERPLBLD CKD-EPI 2021: >90 ML/MIN/1.73M*2
EOSINOPHIL # BLD AUTO: 0.1 X10*3/UL (ref 0–0.7)
EOSINOPHIL NFR BLD AUTO: 1.4 %
ERYTHROCYTE [DISTWIDTH] IN BLOOD BY AUTOMATED COUNT: 12 % (ref 11.5–14.5)
ESTRADIOL SERPL-MCNC: 77 PG/ML
FERRITIN SERPL-MCNC: 33 NG/ML (ref 8–150)
FSH SERPL-ACNC: 7.6 IU/L
GLUCOSE SERPL-MCNC: 90 MG/DL (ref 74–99)
HCT VFR BLD AUTO: 43.8 % (ref 36–46)
HGB BLD-MCNC: 14.5 G/DL (ref 12–16)
IMM GRANULOCYTES # BLD AUTO: 0.01 X10*3/UL (ref 0–0.7)
IMM GRANULOCYTES NFR BLD AUTO: 0.1 % (ref 0–0.9)
IRON SATN MFR SERPL: 30 % (ref 25–45)
IRON SERPL-MCNC: 116 UG/DL (ref 35–150)
LH SERPL-ACNC: 5.9 IU/L
LYMPHOCYTES # BLD AUTO: 2.43 X10*3/UL (ref 1.2–4.8)
LYMPHOCYTES NFR BLD AUTO: 33.7 %
MCH RBC QN AUTO: 31 PG (ref 26–34)
MCHC RBC AUTO-ENTMCNC: 33.1 G/DL (ref 32–36)
MCV RBC AUTO: 94 FL (ref 80–100)
MONOCYTES # BLD AUTO: 0.49 X10*3/UL (ref 0.1–1)
MONOCYTES NFR BLD AUTO: 6.8 %
NEUTROPHILS # BLD AUTO: 4.12 X10*3/UL (ref 1.2–7.7)
NEUTROPHILS NFR BLD AUTO: 57.2 %
NRBC BLD-RTO: 0 /100 WBCS (ref 0–0)
PLATELET # BLD AUTO: 267 X10*3/UL (ref 150–450)
POTASSIUM SERPL-SCNC: 4 MMOL/L (ref 3.5–5.3)
PROT SERPL-MCNC: 7.1 G/DL (ref 6.4–8.2)
RBC # BLD AUTO: 4.68 X10*6/UL (ref 4–5.2)
SODIUM SERPL-SCNC: 139 MMOL/L (ref 136–145)
TIBC SERPL-MCNC: 388 UG/DL (ref 240–445)
TSH SERPL-ACNC: 0.84 MIU/L (ref 0.44–3.98)
UIBC SERPL-MCNC: 272 UG/DL (ref 110–370)
WBC # BLD AUTO: 7.2 X10*3/UL (ref 4.4–11.3)

## 2025-01-14 ENCOUNTER — APPOINTMENT (OUTPATIENT)
Dept: SLEEP MEDICINE | Facility: CLINIC | Age: 25
End: 2025-01-14
Payer: COMMERCIAL

## 2025-01-21 ENCOUNTER — APPOINTMENT (OUTPATIENT)
Dept: PRIMARY CARE | Facility: CLINIC | Age: 25
End: 2025-01-21
Payer: COMMERCIAL

## 2025-01-21 VITALS
WEIGHT: 129 LBS | HEART RATE: 100 BPM | DIASTOLIC BLOOD PRESSURE: 66 MMHG | BODY MASS INDEX: 21.47 KG/M2 | SYSTOLIC BLOOD PRESSURE: 110 MMHG

## 2025-01-21 DIAGNOSIS — F41.9 ANXIETY: ICD-10-CM

## 2025-01-21 DIAGNOSIS — R53.83 OTHER FATIGUE: ICD-10-CM

## 2025-01-21 DIAGNOSIS — Z00.00 HEALTHCARE MAINTENANCE: ICD-10-CM

## 2025-01-21 DIAGNOSIS — G43.009 MIGRAINE WITHOUT AURA AND WITHOUT STATUS MIGRAINOSUS, NOT INTRACTABLE: Primary | ICD-10-CM

## 2025-01-21 DIAGNOSIS — F32.9 REACTIVE DEPRESSION: ICD-10-CM

## 2025-01-21 PROCEDURE — 1036F TOBACCO NON-USER: CPT | Performed by: FAMILY MEDICINE

## 2025-01-21 PROCEDURE — 99214 OFFICE O/P EST MOD 30 MIN: CPT | Performed by: FAMILY MEDICINE

## 2025-01-21 RX ORDER — KETOCONAZOLE 20 MG/ML
SHAMPOO, SUSPENSION TOPICAL 2 TIMES WEEKLY
COMMUNITY
Start: 2025-01-14

## 2025-01-21 RX ORDER — TRETINOIN 0.25 MG/G
CREAM TOPICAL NIGHTLY
COMMUNITY
Start: 2025-01-14

## 2025-01-21 ASSESSMENT — ENCOUNTER SYMPTOMS
BACK PAIN: 0
COUGH: 0
CHEST TIGHTNESS: 0
PALPITATIONS: 0
HEADACHES: 0
DIZZINESS: 0
CHOKING: 0
ARTHRALGIAS: 0
LIGHT-HEADEDNESS: 0
FACIAL ASYMMETRY: 0
APPETITE CHANGE: 0
FEVER: 0
COLOR CHANGE: 0
ACTIVITY CHANGE: 0
FATIGUE: 0

## 2025-01-21 NOTE — PROGRESS NOTES
Subjective   Patient ID: Whit Chacko is a 24 y.o. female who presents for Follow-up.    HPI   Patient reports that she is here to follow-up from sleep medicine visit.  Patient was diagnosed with narcolepsy type II.  Patient was encouraged to get a minimum of 8 hours of overnight sleep and also encouraged to have a afternoon nap if possible for 15 to 20 minutes.  They recommended modafinil but reports that she did want a hold off on this medication until she could extend her sleep.    She recently in last 2 months started eating breakfast. She has also been supported by the doctor she is working with about taking her lunch break at work. She is getting more headaches for the past 4-5 weeks. She does not think they are cycle related. She has not woken up with headache but did wake up and have the headache.     Review of Systems   Constitutional:  Negative for activity change, appetite change, fatigue and fever.   HENT:  Negative for congestion.    Respiratory:  Negative for cough, choking and chest tightness.    Cardiovascular:  Negative for chest pain, palpitations and leg swelling.   Musculoskeletal:  Negative for arthralgias, back pain and gait problem.   Skin:  Negative for color change and pallor.   Neurological:  Negative for dizziness, facial asymmetry, light-headedness and headaches.       Objective   /66 (BP Location: Left arm, Patient Position: Sitting)   Pulse 100   Wt 58.5 kg (129 lb)   BMI 21.47 kg/m²   BSA Body surface area is 1.64 meters squared.      Physical Exam  Constitutional:       General: She is not in acute distress.     Appearance: Normal appearance. She is not toxic-appearing.   HENT:      Head: Normocephalic.      Right Ear: Tympanic membrane, ear canal and external ear normal.      Left Ear: Tympanic membrane, ear canal and external ear normal.   Eyes:      Conjunctiva/sclera: Conjunctivae normal.      Pupils: Pupils are equal, round, and reactive to light.   Cardiovascular:       Rate and Rhythm: Normal rate and regular rhythm.      Pulses: Normal pulses.      Heart sounds: Normal heart sounds.   Pulmonary:      Effort: No respiratory distress.      Breath sounds: No wheezing, rhonchi or rales.   Musculoskeletal:         General: No swelling or tenderness.   Skin:     Findings: No lesion or rash.   Neurological:      General: No focal deficit present.      Mental Status: She is alert and oriented to person, place, and time. Mental status is at baseline.      Gait: Gait normal.   Psychiatric:         Mood and Affect: Mood normal.         Behavior: Behavior normal.         Thought Content: Thought content normal.         Judgment: Judgment normal.       Lab on 11/19/2024   Component Date Value Ref Range Status    Glucose 11/19/2024 90  74 - 99 mg/dL Final    Sodium 11/19/2024 139  136 - 145 mmol/L Final    Potassium 11/19/2024 4.0  3.5 - 5.3 mmol/L Final    Chloride 11/19/2024 101  98 - 107 mmol/L Final    Bicarbonate 11/19/2024 28  21 - 32 mmol/L Final    Anion Gap 11/19/2024 14  10 - 20 mmol/L Final    Urea Nitrogen 11/19/2024 10  6 - 23 mg/dL Final    Creatinine 11/19/2024 0.80  0.50 - 1.05 mg/dL Final    eGFR 11/19/2024 >90  >60 mL/min/1.73m*2 Final    Calculations of estimated GFR are performed using the 2021 CKD-EPI Study Refit equation without the race variable for the IDMS-Traceable creatinine methods.  https://jasn.asnjournals.org/content/early/2021/09/22/ASN.6232178159    Calcium 11/19/2024 9.9  8.6 - 10.6 mg/dL Final    Albumin 11/19/2024 5.2 (H)  3.4 - 5.0 g/dL Final    Alkaline Phosphatase 11/19/2024 35  33 - 110 U/L Final    Total Protein 11/19/2024 7.1  6.4 - 8.2 g/dL Final    AST 11/19/2024 13  9 - 39 U/L Final    Bilirubin, Total 11/19/2024 0.5  0.0 - 1.2 mg/dL Final    ALT 11/19/2024 7  7 - 45 U/L Final    Patients treated with Sulfasalazine may generate falsely decreased results for ALT.    Iron 11/19/2024 116  35 - 150 ug/dL Final    UIBC 11/19/2024 272  110 - 370 ug/dL  Final    TIBC 11/19/2024 388  240 - 445 ug/dL Final    % Saturation 11/19/2024 30  25 - 45 % Final    Ferritin 11/19/2024 33  8 - 150 ng/mL Final    Thyroid Stimulating Hormone 11/19/2024 0.84  0.44 - 3.98 mIU/L Final    WBC 11/19/2024 7.2  4.4 - 11.3 x10*3/uL Final    nRBC 11/19/2024 0.0  0.0 - 0.0 /100 WBCs Final    RBC 11/19/2024 4.68  4.00 - 5.20 x10*6/uL Final    Hemoglobin 11/19/2024 14.5  12.0 - 16.0 g/dL Final    Hematocrit 11/19/2024 43.8  36.0 - 46.0 % Final    MCV 11/19/2024 94  80 - 100 fL Final    MCH 11/19/2024 31.0  26.0 - 34.0 pg Final    MCHC 11/19/2024 33.1  32.0 - 36.0 g/dL Final    RDW 11/19/2024 12.0  11.5 - 14.5 % Final    Platelets 11/19/2024 267  150 - 450 x10*3/uL Final    Neutrophils % 11/19/2024 57.2  40.0 - 80.0 % Final    Immature Granulocytes %, Automated 11/19/2024 0.1  0.0 - 0.9 % Final    Immature Granulocyte Count (IG) includes promyelocytes, myelocytes and metamyelocytes but does not include bands. Percent differential counts (%) should be interpreted in the context of the absolute cell counts (cells/UL).    Lymphocytes % 11/19/2024 33.7  13.0 - 44.0 % Final    Monocytes % 11/19/2024 6.8  2.0 - 10.0 % Final    Eosinophils % 11/19/2024 1.4  0.0 - 6.0 % Final    Basophils % 11/19/2024 0.8  0.0 - 2.0 % Final    Neutrophils Absolute 11/19/2024 4.12  1.20 - 7.70 x10*3/uL Final    Percent differential counts (%) should be interpreted in the context of the absolute cell counts (cells/uL).    Immature Granulocytes Absolute, Au* 11/19/2024 0.01  0.00 - 0.70 x10*3/uL Final    Lymphocytes Absolute 11/19/2024 2.43  1.20 - 4.80 x10*3/uL Final    Monocytes Absolute 11/19/2024 0.49  0.10 - 1.00 x10*3/uL Final    Eosinophils Absolute 11/19/2024 0.10  0.00 - 0.70 x10*3/uL Final    Basophils Absolute 11/19/2024 0.06  0.00 - 0.10 x10*3/uL Final    Luteinizing Hormone 11/19/2024 5.9  IU/L Final    LH Reference Values  Follicular Phase          1.9-12.5 IU/L  Mid-Cycle                 8.7-76.3  IU/L  Luteal Phase              0.5-16.9 IU/L  Post Menopause            5.0-55.2 IU/L  Children                    0- 6.0 IU/L  Adult Male 18-70 years    1.5- 9.3 IU/L  Adult Male >70 years      3.1-34.6 IU/L    Follicle Stimulating Hormone 11/19/2024 7.6  IU/L Final    FSH Ref Values  Follicular   2.0-12.0  IU/L  Mid-Cycle        12.0-25.0  IU/L  Luteal Phase      2.0-12.0  IU/L  Menopause       30.0-150.0  IU/L  Pre-puberty     50% Adult IU/L  Adult Male        2.0-10.0  IU/L   Infants          0.0-1.0  IU/L    Estradiol 11/19/2024 77  pg/mL Final   Office Visit on 06/04/2024   Component Date Value Ref Range Status    Case Report 06/04/2024    Final                    Value:Gynecologic Cytology                              Case: [id number]                                   Authorizing Provider:  Giovana Pulido DO         Collected:           06/04/2024 1545              Ordering Location:     [facility]    Received:            06/04/2024 1545              First Screen:          BESSY Escamilla                                                              Rescreen:              BESSY Ward                                                            Specimen:    ThinPrep Liquid-Based Pap-Imaging System Screen, ECTO/ENDOCERVIX/VAGINA, SCREENING         Final Cytological Interpretation 06/04/2024    Final                    Value:                                                    A. THINPREP PAP ECTO/ENDOCERVIX/VAGINA, SCREENING -                                                     Specimen Adequacy                          Satisfactory for evaluation; endocervical/transformation zone component is                           present                                                    General Categorization                          Negative for intraepithelial lesion or malignancy.                                                    Descriptive Interpretation                          Negative for  intraepithelial lesion or malignancy                                                                                    06/04/2024    Final                    Value:Slide(s) initially screened by BESSY HUERTA at [facility]                           [id number] [facility] [id number]                          QC review performed by BESSY Ward at [facility] [facility] [id number]                          By the signature on this report, the individual or group listed as making                           the Final Interpretation/Diagnosis certifies that they have reviewed this                           case.     ThinPrep Imaging System 06/04/2024    Final                    Value:This specimen has been analyzed by the [employer]inny[employer] Imaging System ([employer] an automated imaging and review system, which assists the                           laboratory in evaluating cells on ThinPrep Pap tests. Following automated                           imaging, selected fields from every slide were reviewed by a                           cytotechnologist and/or pathologist.                              Educational Note 06/04/2024    Final                    Value:Cervical cytology is a screening procedure primarily for squamous cancers                           and precursors and has associated false-negative and false-positives                           results as evidenced by published data. Your patient's test should be                           interpreted in this context, together with the patient's history and                           clinical findings. Regular sampling and follow-up of unexplained clinical                           signs and symptoms are recommended to minimize false negative results.    Perform HPV HR test? 06/04/2024 Always (all interpretations)   Final     Include HPV Genotype? 06/04/2024 Yes   Final    HPV, high-risk 06/04/2024 Negative  Negative Final    HPV Type 16 DNA 06/04/2024 Negative  Negative Final    HPV Type 18 DNA 06/04/2024 Negative  Negative Final    HPV non-Type 16 or 18 DNA 06/04/2024 Negative  Negative Final   Lab on 05/20/2024   Component Date Value Ref Range Status    WBC 05/20/2024 5.9  4.4 - 11.3 x10*3/uL Final    nRBC 05/20/2024 0.0  0.0 - 0.0 /100 WBCs Final    RBC 05/20/2024 4.34  4.00 - 5.20 x10*6/uL Final    Hemoglobin 05/20/2024 13.2  12.0 - 16.0 g/dL Final    Hematocrit 05/20/2024 39.6  36.0 - 46.0 % Final    MCV 05/20/2024 91  80 - 100 fL Final    MCH 05/20/2024 30.4  26.0 - 34.0 pg Final    MCHC 05/20/2024 33.3  32.0 - 36.0 g/dL Final    RDW 05/20/2024 11.9  11.5 - 14.5 % Final    Platelets 05/20/2024 248  150 - 450 x10*3/uL Final    Neutrophils % 05/20/2024 58.6  40.0 - 80.0 % Final    Immature Granulocytes %, Automated 05/20/2024 0.2  0.0 - 0.9 % Final    Immature Granulocyte Count (IG) includes promyelocytes, myelocytes and metamyelocytes but does not include bands. Percent differential counts (%) should be interpreted in the context of the absolute cell counts (cells/UL).    Lymphocytes % 05/20/2024 34.3  13.0 - 44.0 % Final    Monocytes % 05/20/2024 5.7  2.0 - 10.0 % Final    Eosinophils % 05/20/2024 0.7  0.0 - 6.0 % Final    Basophils % 05/20/2024 0.5  0.0 - 2.0 % Final    Neutrophils Absolute 05/20/2024 3.48  1.20 - 7.70 x10*3/uL Final    Percent differential counts (%) should be interpreted in the context of the absolute cell counts (cells/uL).    Immature Granulocytes Absolute, Au* 05/20/2024 0.01  0.00 - 0.70 x10*3/uL Final    Lymphocytes Absolute 05/20/2024 2.04  1.20 - 4.80 x10*3/uL Final    Monocytes Absolute 05/20/2024 0.34  0.10 - 1.00 x10*3/uL Final    Eosinophils Absolute 05/20/2024 0.04  0.00 - 0.70 x10*3/uL Final    Basophils Absolute 05/20/2024 0.03  0.00 - 0.10 x10*3/uL Final    Glucose 05/20/2024 91  74 - 99 mg/dL  Final    Sodium 05/20/2024 139  136 - 145 mmol/L Final    Potassium 05/20/2024 4.4  3.5 - 5.3 mmol/L Final    Chloride 05/20/2024 103  98 - 107 mmol/L Final    Bicarbonate 05/20/2024 25  21 - 32 mmol/L Final    Anion Gap 05/20/2024 15  10 - 20 mmol/L Final    Urea Nitrogen 05/20/2024 12  6 - 23 mg/dL Final    Creatinine 05/20/2024 0.72  0.50 - 1.05 mg/dL Final    eGFR 05/20/2024 >90  >60 mL/min/1.73m*2 Final    Calculations of estimated GFR are performed using the 2021 CKD-EPI Study Refit equation without the race variable for the IDMS-Traceable creatinine methods.  https://jasn.asnjournals.org/content/early/2021/09/22/ASN.9735094504    Calcium 05/20/2024 9.5  8.6 - 10.6 mg/dL Final    Albumin 05/20/2024 4.8  3.4 - 5.0 g/dL Final    Alkaline Phosphatase 05/20/2024 33  33 - 110 U/L Final    Total Protein 05/20/2024 6.9  6.4 - 8.2 g/dL Final    AST 05/20/2024 13  9 - 39 U/L Final    Bilirubin, Total 05/20/2024 0.6  0.0 - 1.2 mg/dL Final    ALT 05/20/2024 7  7 - 45 U/L Final    Patients treated with Sulfasalazine may generate falsely decreased results for ALT.    Cholesterol 05/20/2024 151  0 - 199 mg/dL Final          Age      Desirable   Borderline High   High     0-19 Y     0 - 169       170 - 199     >/= 200    20-24 Y     0 - 189       190 - 224     >/= 225         >24 Y     0 - 199       200 - 239     >/= 240   **All ranges are based on fasting samples. Specific   therapeutic targets will vary based on patient-specific   cardiac risk.    Pediatric guidelines reference:Pediatrics 2011, 128(S5).Adult guidelines reference: NCEP ATPIII Guidelines,ALMA ROSA 2001, 258:2486-97    Venipuncture immediately after or during the administration of Metamizole may lead to falsely low results. Testing should be performed immediately prior to Metamizole dosing.    HDL-Cholesterol 05/20/2024 57.1  mg/dL Final      Age       Very Low   Low     Normal    High    0-19 Y    < 35      < 40     40-45     ----  20-24 Y    ----     < 40       >45      ----        >24 Y      ----     < 40     40-60      >60      Cholesterol/HDL Ratio 05/20/2024 2.6   Final      Ref Values  Desirable  < 3.4  High Risk  > 5.0    LDL Calculated 05/20/2024 89  <=119 mg/dL Final                                Near   Borderline      AGE      Desirable  Optimal    High     High     Very High     0-19 Y     0 - 109     ---    110-129   >/= 130     ----    20-24 Y     0 - 119     ---    120-159   >/= 160     ----      >24 Y     0 -  99   100-129  130-159   160-189     >/=190      VLDL 05/20/2024 5  0 - 40 mg/dL Final    Triglycerides 05/20/2024 27  0 - 149 mg/dL Final       Age         Desirable   Borderline High   High     Very High   0 D-90 D    19 - 174         ----         ----        ----  91 D- 9 Y     0 -  74        75 -  99     >/= 100      ----    10-19 Y     0 -  89        90 - 129     >/= 130      ----    20-24 Y     0 - 114       115 - 149     >/= 150      ----         >24 Y     0 - 149       150 - 199    200- 499    >/= 500    Venipuncture immediately after or during the administration of Metamizole may lead to falsely low results. Testing should be performed immediately prior to Metamizole dosing.    Non HDL Cholesterol 05/20/2024 94  0 - 149 mg/dL Final          Age       Desirable   Borderline High   High     Very High     0-19 Y     0 - 119       120 - 144     >/= 145    >/= 160    20-24 Y     0 - 149       150 - 189     >/= 190      ----         >24 Y    30 mg/dL above LDL Cholesterol goal      Thyroid Stimulating Hormone 05/20/2024 0.56  0.44 - 3.98 mIU/L Final    Vitamin D, 25-Hydroxy, Total 05/20/2024 31  30 - 100 ng/mL Final    Iron 05/20/2024 76  35 - 150 ug/dL Final    UIBC 05/20/2024 270  110 - 370 ug/dL Final    TIBC 05/20/2024 346  240 - 445 ug/dL Final    % Saturation 05/20/2024 22 (L)  25 - 45 % Final    Ferritin 05/20/2024 37  8 - 150 ng/mL Final   Lab on 01/22/2024   Component Date Value Ref Range Status    Glucose 01/22/2024 100 (H)  74 - 99 mg/dL  Final    Sodium 01/22/2024 141  136 - 145 mmol/L Final    Potassium 01/22/2024 4.4  3.5 - 5.3 mmol/L Final    Chloride 01/22/2024 105  98 - 107 mmol/L Final    Bicarbonate 01/22/2024 27  21 - 32 mmol/L Final    Anion Gap 01/22/2024 13  10 - 20 mmol/L Final    Urea Nitrogen 01/22/2024 15  6 - 23 mg/dL Final    Creatinine 01/22/2024 0.82  0.50 - 1.05 mg/dL Final    eGFR 01/22/2024 >90  >60 mL/min/1.73m*2 Final    Calculations of estimated GFR are performed using the 2021 CKD-EPI Study Refit equation without the race variable for the IDMS-Traceable creatinine methods.  https://jasn.asnjournals.org/content/early/2021/09/22/ASN.7571438546    Calcium 01/22/2024 9.8  8.6 - 10.6 mg/dL Final    Albumin 01/22/2024 4.6  3.4 - 5.0 g/dL Final    Alkaline Phosphatase 01/22/2024 38  33 - 110 U/L Final    Total Protein 01/22/2024 7.0  6.4 - 8.2 g/dL Final    AST 01/22/2024 14  9 - 39 U/L Final    Bilirubin, Total 01/22/2024 0.7  0.0 - 1.2 mg/dL Final    ALT 01/22/2024 8  7 - 45 U/L Final    Patients treated with Sulfasalazine may generate falsely decreased results for ALT.     Current Outpatient Medications on File Prior to Visit   Medication Sig Dispense Refill    albuterol 90 mcg/actuation inhaler Inhale 2 puffs every 4 hours if needed for wheezing. 18 g 1    cholecalciferol (Vitamin D-3) 50 MCG (2000 UT) tablet Take 1 tablet (2,000 Units) by mouth once daily.      ketoconazole (NIZOral) 2 % shampoo Apply topically 2 times a week.      tretinoin (Retin-A) 0.025 % cream Apply topically once daily at bedtime.       No current facility-administered medications on file prior to visit.     No images are attached to the encounter.            Assessment/Plan   Diagnoses and all orders for this visit:  Migraine without aura and without status migrainosus, not intractable  Reactive depression  Anxiety        1.  Patient to take excedrin migraine  2.  Patient to have blood work performed  3.  Will hold off on stimulant medication at this  time  4.  Patient call for questions or concerns

## 2025-02-04 LAB
25(OH)D3+25(OH)D2 SERPL-MCNC: 26 NG/ML (ref 30–100)
ALBUMIN SERPL-MCNC: 4.8 G/DL (ref 3.6–5.1)
ALP SERPL-CCNC: 33 U/L (ref 31–125)
ALT SERPL-CCNC: 8 U/L (ref 6–29)
ANION GAP SERPL CALCULATED.4IONS-SCNC: 10 MMOL/L (CALC) (ref 7–17)
AST SERPL-CCNC: 14 U/L (ref 10–30)
BASOPHILS # BLD AUTO: 31 CELLS/UL (ref 0–200)
BASOPHILS NFR BLD AUTO: 0.7 %
BILIRUB SERPL-MCNC: 0.9 MG/DL (ref 0.2–1.2)
BUN SERPL-MCNC: 14 MG/DL (ref 7–25)
CALCIUM SERPL-MCNC: 9.6 MG/DL (ref 8.6–10.2)
CHLORIDE SERPL-SCNC: 103 MMOL/L (ref 98–110)
CO2 SERPL-SCNC: 26 MMOL/L (ref 20–32)
CREAT SERPL-MCNC: 0.82 MG/DL (ref 0.5–0.96)
EGFRCR SERPLBLD CKD-EPI 2021: 102 ML/MIN/1.73M2
EOSINOPHIL # BLD AUTO: 150 CELLS/UL (ref 15–500)
EOSINOPHIL NFR BLD AUTO: 3.4 %
ERYTHROCYTE [DISTWIDTH] IN BLOOD BY AUTOMATED COUNT: 11.7 % (ref 11–15)
FERRITIN SERPL-MCNC: 26 NG/ML (ref 16–154)
GLUCOSE SERPL-MCNC: 101 MG/DL (ref 65–99)
HCT VFR BLD AUTO: 43.3 % (ref 35–45)
HGB BLD-MCNC: 14.1 G/DL (ref 11.7–15.5)
IRON SATN MFR SERPL: 48 % (CALC) (ref 16–45)
IRON SERPL-MCNC: 150 MCG/DL (ref 40–190)
LYMPHOCYTES # BLD AUTO: 1998 CELLS/UL (ref 850–3900)
LYMPHOCYTES NFR BLD AUTO: 45.4 %
MCH RBC QN AUTO: 30.7 PG (ref 27–33)
MCHC RBC AUTO-ENTMCNC: 32.6 G/DL (ref 32–36)
MCV RBC AUTO: 94.1 FL (ref 80–100)
MONOCYTES # BLD AUTO: 405 CELLS/UL (ref 200–950)
MONOCYTES NFR BLD AUTO: 9.2 %
NEUTROPHILS # BLD AUTO: 1817 CELLS/UL (ref 1500–7800)
NEUTROPHILS NFR BLD AUTO: 41.3 %
PLATELET # BLD AUTO: 241 THOUSAND/UL (ref 140–400)
PMV BLD REES-ECKER: 10 FL (ref 7.5–12.5)
POTASSIUM SERPL-SCNC: 4.2 MMOL/L (ref 3.5–5.3)
PROT SERPL-MCNC: 7 G/DL (ref 6.1–8.1)
RBC # BLD AUTO: 4.6 MILLION/UL (ref 3.8–5.1)
SODIUM SERPL-SCNC: 139 MMOL/L (ref 135–146)
TIBC SERPL-MCNC: 310 MCG/DL (CALC) (ref 250–450)
TSH SERPL-ACNC: 1.57 MIU/L
WBC # BLD AUTO: 4.4 THOUSAND/UL (ref 3.8–10.8)

## 2025-02-07 DIAGNOSIS — R53.83 OTHER FATIGUE: ICD-10-CM

## 2025-04-01 ENCOUNTER — APPOINTMENT (OUTPATIENT)
Dept: DERMATOLOGY | Facility: CLINIC | Age: 25
End: 2025-04-01
Payer: COMMERCIAL

## 2025-04-22 ENCOUNTER — APPOINTMENT (OUTPATIENT)
Dept: PRIMARY CARE | Facility: CLINIC | Age: 25
End: 2025-04-22
Payer: COMMERCIAL

## 2025-05-06 ENCOUNTER — APPOINTMENT (OUTPATIENT)
Dept: PRIMARY CARE | Facility: CLINIC | Age: 25
End: 2025-05-06
Payer: COMMERCIAL

## 2025-05-06 VITALS
BODY MASS INDEX: 20.77 KG/M2 | SYSTOLIC BLOOD PRESSURE: 102 MMHG | HEART RATE: 66 BPM | DIASTOLIC BLOOD PRESSURE: 60 MMHG | WEIGHT: 124.8 LBS

## 2025-05-06 DIAGNOSIS — G43.011 INTRACTABLE MIGRAINE WITHOUT AURA AND WITH STATUS MIGRAINOSUS: Primary | ICD-10-CM

## 2025-05-06 PROCEDURE — 1036F TOBACCO NON-USER: CPT | Performed by: FAMILY MEDICINE

## 2025-05-06 PROCEDURE — 99214 OFFICE O/P EST MOD 30 MIN: CPT | Performed by: FAMILY MEDICINE

## 2025-05-06 ASSESSMENT — ENCOUNTER SYMPTOMS
FATIGUE: 0
ARTHRALGIAS: 0
DIZZINESS: 0
COLOR CHANGE: 0
COUGH: 0
ACTIVITY CHANGE: 0
LIGHT-HEADEDNESS: 0
FEVER: 0
APPETITE CHANGE: 0
CHEST TIGHTNESS: 0
HEADACHES: 1
BACK PAIN: 0
CHOKING: 0
PALPITATIONS: 0
FACIAL ASYMMETRY: 0

## 2025-05-06 ASSESSMENT — PATIENT HEALTH QUESTIONNAIRE - PHQ9
1. LITTLE INTEREST OR PLEASURE IN DOING THINGS: NOT AT ALL
SUM OF ALL RESPONSES TO PHQ9 QUESTIONS 1 AND 2: 0
2. FEELING DOWN, DEPRESSED OR HOPELESS: NOT AT ALL
10. IF YOU CHECKED OFF ANY PROBLEMS, HOW DIFFICULT HAVE THESE PROBLEMS MADE IT FOR YOU TO DO YOUR WORK, TAKE CARE OF THINGS AT HOME, OR GET ALONG WITH OTHER PEOPLE: NOT DIFFICULT AT ALL

## 2025-05-06 NOTE — PROGRESS NOTES
Subjective   Patient ID: Whit Chacko is a 25 y.o. female who presents for Neurology follow up  (Dr Andre Velásquez ).    HPI   Patient reports that she is here to follow-up from sleep medicine visit.  Patient was diagnosed with narcolepsy type II.      She explains she did follow up with Dr. Velásquez, neurology for her narcolepsy without cataplexy. She was told that she could have a false positive for her sleep study. She was told that now that her anorexia is under better control and she is eating more that her symptoms may be under better control for the narcolepsy. She was told to sleep about 7-8 hours a night. She then was told to follow up in 4-6 weeks. She was told if this did not help her symptoms then she was told she might get started on a stimulant with close monitoring of her symptoms.     Last Monday had to go her home from work because she had a headache that was 10 out of 10.  Explains that when she went home she was having headache that was so bad even with heat applied to her head was not helping would wake up from sleep every hour because pain was so intense.  Really nothing took the headache away.  Classify this as the worst headache of her life.    Review of Systems   Constitutional:  Negative for activity change, appetite change, fatigue and fever.   HENT:  Negative for congestion.    Respiratory:  Negative for cough, choking and chest tightness.    Cardiovascular:  Negative for chest pain, palpitations and leg swelling.   Musculoskeletal:  Negative for arthralgias, back pain and gait problem.   Skin:  Negative for color change and pallor.   Neurological:  Positive for headaches. Negative for dizziness, facial asymmetry and light-headedness.       Objective   /60 (BP Location: Left arm, Patient Position: Sitting)   Pulse 66   Wt 56.6 kg (124 lb 12.8 oz)   BMI 20.77 kg/m²   BSA Body surface area is 1.61 meters squared.      Physical Exam  Constitutional:       General: She is not in acute  distress.     Appearance: Normal appearance. She is not toxic-appearing.   HENT:      Head: Normocephalic.      Right Ear: Tympanic membrane, ear canal and external ear normal.      Left Ear: Tympanic membrane, ear canal and external ear normal.   Eyes:      Conjunctiva/sclera: Conjunctivae normal.      Pupils: Pupils are equal, round, and reactive to light.   Cardiovascular:      Rate and Rhythm: Normal rate and regular rhythm.      Pulses: Normal pulses.      Heart sounds: Normal heart sounds.   Pulmonary:      Effort: No respiratory distress.      Breath sounds: No wheezing, rhonchi or rales.   Musculoskeletal:         General: No swelling or tenderness.   Skin:     Findings: No lesion or rash.   Neurological:      General: No focal deficit present.      Mental Status: She is alert and oriented to person, place, and time. Mental status is at baseline.      Gait: Gait normal.   Psychiatric:         Mood and Affect: Mood normal.         Behavior: Behavior normal.         Thought Content: Thought content normal.         Judgment: Judgment normal.       Orders Only on 02/03/2025   Component Date Value Ref Range Status    IRON, TOTAL 02/03/2025 150  40 - 190 mcg/dL Final    IRON BINDING CAPACITY 02/03/2025 310  250 - 450 mcg/dL (calc) Final    % SATURATION 02/03/2025 48 (H)  16 - 45 % (calc) Final    GLUCOSE 02/03/2025 101 (H)  65 - 99 mg/dL Final    Comment:               Fasting reference interval     For someone without known diabetes, a glucose value  between 100 and 125 mg/dL is consistent with  prediabetes and should be confirmed with a  follow-up test.         UREA NITROGEN (BUN) 02/03/2025 14  7 - 25 mg/dL Final    CREATININE 02/03/2025 0.82  0.50 - 0.96 mg/dL Final    EGFR 02/03/2025 102  > OR = 60 mL/min/1.73m2 Final    SODIUM 02/03/2025 139  135 - 146 mmol/L Final    POTASSIUM 02/03/2025 4.2  3.5 - 5.3 mmol/L Final    CHLORIDE 02/03/2025 103  98 - 110 mmol/L Final    CARBON DIOXIDE 02/03/2025 26  20 - 32  mmol/L Final    ELECTROLYTE BALANCE 02/03/2025 10  7 - 17 mmol/L (calc) Final    CALCIUM 02/03/2025 9.6  8.6 - 10.2 mg/dL Final    PROTEIN, TOTAL 02/03/2025 7.0  6.1 - 8.1 g/dL Final    ALBUMIN 02/03/2025 4.8  3.6 - 5.1 g/dL Final    BILIRUBIN, TOTAL 02/03/2025 0.9  0.2 - 1.2 mg/dL Final    ALKALINE PHOSPHATASE 02/03/2025 33  31 - 125 U/L Final    AST 02/03/2025 14  10 - 30 U/L Final    ALT 02/03/2025 8  6 - 29 U/L Final    WHITE BLOOD CELL COUNT 02/03/2025 4.4  3.8 - 10.8 Thousand/uL Final    RED BLOOD CELL COUNT 02/03/2025 4.60  3.80 - 5.10 Million/uL Final    HEMOGLOBIN 02/03/2025 14.1  11.7 - 15.5 g/dL Final    HEMATOCRIT 02/03/2025 43.3  35.0 - 45.0 % Final    MCV 02/03/2025 94.1  80.0 - 100.0 fL Final    MCH 02/03/2025 30.7  27.0 - 33.0 pg Final    MCHC 02/03/2025 32.6  32.0 - 36.0 g/dL Final    Comment: For adults, a slight decrease in the calculated MCHC  value (in the range of 30 to 32 g/dL) is most likely  not clinically significant; however, it should be  interpreted with caution in correlation with other  red cell parameters and the patient's clinical  condition.      RDW 02/03/2025 11.7  11.0 - 15.0 % Final    PLATELET COUNT 02/03/2025 241  140 - 400 Thousand/uL Final    MPV 02/03/2025 10.0  7.5 - 12.5 fL Final    ABSOLUTE NEUTROPHILS 02/03/2025 1,817  1,500 - 7,800 cells/uL Final    ABSOLUTE LYMPHOCYTES 02/03/2025 1,998  850 - 3,900 cells/uL Final    ABSOLUTE MONOCYTES 02/03/2025 405  200 - 950 cells/uL Final    ABSOLUTE EOSINOPHILS 02/03/2025 150  15 - 500 cells/uL Final    ABSOLUTE BASOPHILS 02/03/2025 31  0 - 200 cells/uL Final    NEUTROPHILS 02/03/2025 41.3  % Final    LYMPHOCYTES 02/03/2025 45.4  % Final    MONOCYTES 02/03/2025 9.2  % Final    EOSINOPHILS 02/03/2025 3.4  % Final    BASOPHILS 02/03/2025 0.7  % Final    FERRITIN 02/03/2025 26  16 - 154 ng/mL Final    TSH W/REFLEX TO FT4 02/03/2025 1.57  mIU/L Final    Comment:           Reference Range                         > or = 20 Years   0.40-4.50                              Pregnancy Ranges            First trimester    0.26-2.66            Second trimester   0.55-2.73            Third trimester    0.43-2.91      VITAMIN D,25-OH,TOTAL,IA 02/03/2025 26 (L)  30 - 100 ng/mL Final    Comment: Vitamin D Status         25-OH Vitamin D:     Deficiency:                    <20 ng/mL  Insufficiency:             20 - 29 ng/mL  Optimal:                 > or = 30 ng/mL     For 25-OH Vitamin D testing on patients on   D2-supplementation and patients for whom quantitation   of D2 and D3 fractions is required, the QuestAssureD(TM)  25-OH VIT D, (D2,D3), LC/MS/MS is recommended: order   code 51483 (patients >2yrs).     See Note 1     Note 1     For additional information, please refer to   http://education.Funsherpa/faq/VKW237   (This link is being provided for informational/  educational purposes only.)     Lab on 11/19/2024   Component Date Value Ref Range Status    Glucose 11/19/2024 90  74 - 99 mg/dL Final    Sodium 11/19/2024 139  136 - 145 mmol/L Final    Potassium 11/19/2024 4.0  3.5 - 5.3 mmol/L Final    Chloride 11/19/2024 101  98 - 107 mmol/L Final    Bicarbonate 11/19/2024 28  21 - 32 mmol/L Final    Anion Gap 11/19/2024 14  10 - 20 mmol/L Final    Urea Nitrogen 11/19/2024 10  6 - 23 mg/dL Final    Creatinine 11/19/2024 0.80  0.50 - 1.05 mg/dL Final    eGFR 11/19/2024 >90  >60 mL/min/1.73m*2 Final    Calculations of estimated GFR are performed using the 2021 CKD-EPI Study Refit equation without the race variable for the IDMS-Traceable creatinine methods.  https://jasn.asnjournals.org/content/early/2021/09/22/ASN.0574435470    Calcium 11/19/2024 9.9  8.6 - 10.6 mg/dL Final    Albumin 11/19/2024 5.2 (H)  3.4 - 5.0 g/dL Final    Alkaline Phosphatase 11/19/2024 35  33 - 110 U/L Final    Total Protein 11/19/2024 7.1  6.4 - 8.2 g/dL Final    AST 11/19/2024 13  9 - 39 U/L Final    Bilirubin, Total 11/19/2024 0.5  0.0 - 1.2 mg/dL Final    ALT  11/19/2024 7  7 - 45 U/L Final    Patients treated with Sulfasalazine may generate falsely decreased results for ALT.    Iron 11/19/2024 116  35 - 150 ug/dL Final    UIBC 11/19/2024 272  110 - 370 ug/dL Final    TIBC 11/19/2024 388  240 - 445 ug/dL Final    % Saturation 11/19/2024 30  25 - 45 % Final    Ferritin 11/19/2024 33  8 - 150 ng/mL Final    Thyroid Stimulating Hormone 11/19/2024 0.84  0.44 - 3.98 mIU/L Final    WBC 11/19/2024 7.2  4.4 - 11.3 x10*3/uL Final    nRBC 11/19/2024 0.0  0.0 - 0.0 /100 WBCs Final    RBC 11/19/2024 4.68  4.00 - 5.20 x10*6/uL Final    Hemoglobin 11/19/2024 14.5  12.0 - 16.0 g/dL Final    Hematocrit 11/19/2024 43.8  36.0 - 46.0 % Final    MCV 11/19/2024 94  80 - 100 fL Final    MCH 11/19/2024 31.0  26.0 - 34.0 pg Final    MCHC 11/19/2024 33.1  32.0 - 36.0 g/dL Final    RDW 11/19/2024 12.0  11.5 - 14.5 % Final    Platelets 11/19/2024 267  150 - 450 x10*3/uL Final    Neutrophils % 11/19/2024 57.2  40.0 - 80.0 % Final    Immature Granulocytes %, Automated 11/19/2024 0.1  0.0 - 0.9 % Final    Immature Granulocyte Count (IG) includes promyelocytes, myelocytes and metamyelocytes but does not include bands. Percent differential counts (%) should be interpreted in the context of the absolute cell counts (cells/UL).    Lymphocytes % 11/19/2024 33.7  13.0 - 44.0 % Final    Monocytes % 11/19/2024 6.8  2.0 - 10.0 % Final    Eosinophils % 11/19/2024 1.4  0.0 - 6.0 % Final    Basophils % 11/19/2024 0.8  0.0 - 2.0 % Final    Neutrophils Absolute 11/19/2024 4.12  1.20 - 7.70 x10*3/uL Final    Percent differential counts (%) should be interpreted in the context of the absolute cell counts (cells/uL).    Immature Granulocytes Absolute, Au* 11/19/2024 0.01  0.00 - 0.70 x10*3/uL Final    Lymphocytes Absolute 11/19/2024 2.43  1.20 - 4.80 x10*3/uL Final    Monocytes Absolute 11/19/2024 0.49  0.10 - 1.00 x10*3/uL Final    Eosinophils Absolute 11/19/2024 0.10  0.00 - 0.70 x10*3/uL Final    Basophils Absolute  11/19/2024 0.06  0.00 - 0.10 x10*3/uL Final    Luteinizing Hormone 11/19/2024 5.9  IU/L Final    LH Reference Values  Follicular Phase          1.9-12.5 IU/L  Mid-Cycle                 8.7-76.3 IU/L  Luteal Phase              0.5-16.9 IU/L  Post Menopause            5.0-55.2 IU/L  Children                    0- 6.0 IU/L  Adult Male 18-70 years    1.5- 9.3 IU/L  Adult Male >70 years      3.1-34.6 IU/L    Follicle Stimulating Hormone 11/19/2024 7.6  IU/L Final    FSH Ref Values  Follicular   2.0-12.0  IU/L  Mid-Cycle        12.0-25.0  IU/L  Luteal Phase      2.0-12.0  IU/L  Menopause       30.0-150.0  IU/L  Pre-puberty     50% Adult IU/L  Adult Male        2.0-10.0  IU/L   Infants          0.0-1.0  IU/L    Estradiol 11/19/2024 77  pg/mL Final   Office Visit on 06/04/2024   Component Date Value Ref Range Status    Case Report 06/04/2024    Final                    Value:Gynecologic Cytology                              Case: [id number]                                   Authorizing Provider:  Giovana Pulido DO         Collected:           06/04/2024 1545              Ordering Location:     [facility]    Received:            06/04/2024 1545              First Screen:          BESSY Escamilla                                                              Rescreen:              BESSY Ward                                                            Specimen:    ThinPrep Liquid-Based Pap-Imaging System Screen, ECTO/ENDOCERVIX/VAGINA, SCREENING         Final Cytological Interpretation 06/04/2024    Final                    Value:                                                    A. THINPREP PAP ECTO/ENDOCERVIX/VAGINA, SCREENING -                                                     Specimen Adequacy                          Satisfactory for evaluation; endocervical/transformation zone component is                           present                                                    General Categorization                           Negative for intraepithelial lesion or malignancy.                                                    Descriptive Interpretation                          Negative for intraepithelial lesion or malignancy                                                                                    06/04/2024    Final                    Value:Slide(s) initially screened by BESSY HUERTA at [facility]                           [id number] [facility] [id number]                          QC review performed by BESSY Ward at [facility] [facility] [id number]                          By the signature on this report, the individual or group listed as making                           the Final Interpretation/Diagnosis certifies that they have reviewed this                           case.     ThinPrep Imaging System 06/04/2024    Final                    Value:This specimen has been analyzed by the ThinPrep Imaging System ([employer] an automated imaging and review system, which assists the                           laboratory in evaluating cells on ThinPrep Pap tests. Following automated                           imaging, selected fields from every slide were reviewed by a                           cytotechnologist and/or pathologist.                              Educational Note 06/04/2024    Final                    Value:Cervical cytology is a screening procedure primarily for squamous cancers                           and precursors and has associated false-negative and false-positives                           results as evidenced by published data. Your patient's test should be                           interpreted in this context, together with the patient's history and                           clinical findings. Regular sampling and follow-up of  unexplained clinical                           signs and symptoms are recommended to minimize false negative results.    Perform HPV HR test? 06/04/2024 Always (all interpretations)   Final    Include HPV Genotype? 06/04/2024 Yes   Final    HPV, high-risk 06/04/2024 Negative  Negative Final    HPV Type 16 DNA 06/04/2024 Negative  Negative Final    HPV Type 18 DNA 06/04/2024 Negative  Negative Final    HPV non-Type 16 or 18 DNA 06/04/2024 Negative  Negative Final   Lab on 05/20/2024   Component Date Value Ref Range Status    WBC 05/20/2024 5.9  4.4 - 11.3 x10*3/uL Final    nRBC 05/20/2024 0.0  0.0 - 0.0 /100 WBCs Final    RBC 05/20/2024 4.34  4.00 - 5.20 x10*6/uL Final    Hemoglobin 05/20/2024 13.2  12.0 - 16.0 g/dL Final    Hematocrit 05/20/2024 39.6  36.0 - 46.0 % Final    MCV 05/20/2024 91  80 - 100 fL Final    MCH 05/20/2024 30.4  26.0 - 34.0 pg Final    MCHC 05/20/2024 33.3  32.0 - 36.0 g/dL Final    RDW 05/20/2024 11.9  11.5 - 14.5 % Final    Platelets 05/20/2024 248  150 - 450 x10*3/uL Final    Neutrophils % 05/20/2024 58.6  40.0 - 80.0 % Final    Immature Granulocytes %, Automated 05/20/2024 0.2  0.0 - 0.9 % Final    Immature Granulocyte Count (IG) includes promyelocytes, myelocytes and metamyelocytes but does not include bands. Percent differential counts (%) should be interpreted in the context of the absolute cell counts (cells/UL).    Lymphocytes % 05/20/2024 34.3  13.0 - 44.0 % Final    Monocytes % 05/20/2024 5.7  2.0 - 10.0 % Final    Eosinophils % 05/20/2024 0.7  0.0 - 6.0 % Final    Basophils % 05/20/2024 0.5  0.0 - 2.0 % Final    Neutrophils Absolute 05/20/2024 3.48  1.20 - 7.70 x10*3/uL Final    Percent differential counts (%) should be interpreted in the context of the absolute cell counts (cells/uL).    Immature Granulocytes Absolute, Au* 05/20/2024 0.01  0.00 - 0.70 x10*3/uL Final    Lymphocytes Absolute 05/20/2024 2.04  1.20 - 4.80 x10*3/uL Final    Monocytes Absolute 05/20/2024 0.34  0.10 -  1.00 x10*3/uL Final    Eosinophils Absolute 05/20/2024 0.04  0.00 - 0.70 x10*3/uL Final    Basophils Absolute 05/20/2024 0.03  0.00 - 0.10 x10*3/uL Final    Glucose 05/20/2024 91  74 - 99 mg/dL Final    Sodium 05/20/2024 139  136 - 145 mmol/L Final    Potassium 05/20/2024 4.4  3.5 - 5.3 mmol/L Final    Chloride 05/20/2024 103  98 - 107 mmol/L Final    Bicarbonate 05/20/2024 25  21 - 32 mmol/L Final    Anion Gap 05/20/2024 15  10 - 20 mmol/L Final    Urea Nitrogen 05/20/2024 12  6 - 23 mg/dL Final    Creatinine 05/20/2024 0.72  0.50 - 1.05 mg/dL Final    eGFR 05/20/2024 >90  >60 mL/min/1.73m*2 Final    Calculations of estimated GFR are performed using the 2021 CKD-EPI Study Refit equation without the race variable for the IDMS-Traceable creatinine methods.  https://jasn.asnjournals.org/content/early/2021/09/22/ASN.6700170764    Calcium 05/20/2024 9.5  8.6 - 10.6 mg/dL Final    Albumin 05/20/2024 4.8  3.4 - 5.0 g/dL Final    Alkaline Phosphatase 05/20/2024 33  33 - 110 U/L Final    Total Protein 05/20/2024 6.9  6.4 - 8.2 g/dL Final    AST 05/20/2024 13  9 - 39 U/L Final    Bilirubin, Total 05/20/2024 0.6  0.0 - 1.2 mg/dL Final    ALT 05/20/2024 7  7 - 45 U/L Final    Patients treated with Sulfasalazine may generate falsely decreased results for ALT.    Cholesterol 05/20/2024 151  0 - 199 mg/dL Final          Age      Desirable   Borderline High   High     0-19 Y     0 - 169       170 - 199     >/= 200    20-24 Y     0 - 189       190 - 224     >/= 225         >24 Y     0 - 199       200 - 239     >/= 240   **All ranges are based on fasting samples. Specific   therapeutic targets will vary based on patient-specific   cardiac risk.    Pediatric guidelines reference:Pediatrics 2011, 128(S5).Adult guidelines reference: NCEP ATPIII Guidelines,ALMA ROSA 2001, 258:2486-97    Venipuncture immediately after or during the administration of Metamizole may lead to falsely low results. Testing should be performed immediately prior to  Metamizole dosing.    HDL-Cholesterol 05/20/2024 57.1  mg/dL Final      Age       Very Low   Low     Normal    High    0-19 Y    < 35      < 40     40-45     ----  20-24 Y    ----     < 40      >45      ----        >24 Y      ----     < 40     40-60      >60      Cholesterol/HDL Ratio 05/20/2024 2.6   Final      Ref Values  Desirable  < 3.4  High Risk  > 5.0    LDL Calculated 05/20/2024 89  <=119 mg/dL Final                                Near   Borderline      AGE      Desirable  Optimal    High     High     Very High     0-19 Y     0 - 109     ---    110-129   >/= 130     ----    20-24 Y     0 - 119     ---    120-159   >/= 160     ----      >24 Y     0 -  99   100-129  130-159   160-189     >/=190      VLDL 05/20/2024 5  0 - 40 mg/dL Final    Triglycerides 05/20/2024 27  0 - 149 mg/dL Final       Age         Desirable   Borderline High   High     Very High   0 D-90 D    19 - 174         ----         ----        ----  91 D- 9 Y     0 -  74        75 -  99     >/= 100      ----    10-19 Y     0 -  89        90 - 129     >/= 130      ----    20-24 Y     0 - 114       115 - 149     >/= 150      ----         >24 Y     0 - 149       150 - 199    200- 499    >/= 500    Venipuncture immediately after or during the administration of Metamizole may lead to falsely low results. Testing should be performed immediately prior to Metamizole dosing.    Non HDL Cholesterol 05/20/2024 94  0 - 149 mg/dL Final          Age       Desirable   Borderline High   High     Very High     0-19 Y     0 - 119       120 - 144     >/= 145    >/= 160    20-24 Y     0 - 149       150 - 189     >/= 190      ----         >24 Y    30 mg/dL above LDL Cholesterol goal      Thyroid Stimulating Hormone 05/20/2024 0.56  0.44 - 3.98 mIU/L Final    Vitamin D, 25-Hydroxy, Total 05/20/2024 31  30 - 100 ng/mL Final    Iron 05/20/2024 76  35 - 150 ug/dL Final    UIBC 05/20/2024 270  110 - 370 ug/dL Final    TIBC 05/20/2024 346  240 - 445 ug/dL Final    %  Saturation 05/20/2024 22 (L)  25 - 45 % Final    Ferritin 05/20/2024 37  8 - 150 ng/mL Final     Current Outpatient Medications on File Prior to Visit   Medication Sig Dispense Refill    albuterol 90 mcg/actuation inhaler Inhale 2 puffs every 4 hours if needed for wheezing. 18 g 1    cholecalciferol (Vitamin D-3) 50 MCG (2000 UT) tablet Take 1 tablet (2,000 Units) by mouth once daily.      tretinoin (Retin-A) 0.025 % cream Apply topically once daily at bedtime.      [DISCONTINUED] ketoconazole (NIZOral) 2 % shampoo Apply topically 2 times a week. (Patient not taking: Reported on 5/6/2025)       No current facility-administered medications on file prior to visit.     No images are attached to the encounter.            Assessment/Plan   Diagnoses and all orders for this visit:  Intractable migraine without aura and with status migrainosus      1.  Patient to take excedrin migraine  2.  Patient to have blood work performed  3.  Will hold off on stimulant medication at this time  4.  Patient call for questions or concerns

## 2025-06-27 ENCOUNTER — TELEPHONE (OUTPATIENT)
Dept: PRIMARY CARE | Facility: CLINIC | Age: 25
End: 2025-06-27

## 2025-06-27 NOTE — TELEPHONE ENCOUNTER
Radha [facility] in [facility] needs a auth done to schedule patients MRI of the brain. She has medical mutual ins. Please advise

## 2025-07-17 DIAGNOSIS — Z00.00 HEALTHCARE MAINTENANCE: ICD-10-CM

## 2025-07-22 LAB
CHOLEST SERPL-MCNC: 156 MG/DL
CHOLEST/HDLC SERPL: 2.7 (CALC)
HDLC SERPL-MCNC: 58 MG/DL
LDLC SERPL CALC-MCNC: 87 MG/DL (CALC)
NONHDLC SERPL-MCNC: 98 MG/DL (CALC)
TRIGL SERPL-MCNC: 40 MG/DL

## 2025-07-29 ENCOUNTER — APPOINTMENT (OUTPATIENT)
Dept: PRIMARY CARE | Facility: CLINIC | Age: 25
End: 2025-07-29
Payer: COMMERCIAL

## 2025-08-04 ASSESSMENT — ENCOUNTER SYMPTOMS
LIGHT-HEADEDNESS: 0
ACTIVITY CHANGE: 0
FATIGUE: 0
CHOKING: 0
APPETITE CHANGE: 0
PALPITATIONS: 0
COLOR CHANGE: 0
HEADACHES: 1
BACK PAIN: 0
CHEST TIGHTNESS: 0
ARTHRALGIAS: 0
COUGH: 0
FACIAL ASYMMETRY: 0
DIZZINESS: 0
FEVER: 0

## 2025-08-04 NOTE — PROGRESS NOTES
Subjective   Patient ID: Whit Chacko is a 25 y.o. female who presents for Follow-up.    HPI   Patient reports that she is here to follow-up from sleep medicine visit.  Patient was diagnosed with narcolepsy type II.      She explains she did follow up with Dr. Velásquez, neurology for her narcolepsy without cataplexy. She was told that she could have a false positive for her sleep study. She was told that now that her anorexia is under better control and she is eating more that her symptoms may be under better control for the narcolepsy. She was told to sleep about 7-8 hours a night. She then was told to follow up in 4-6 weeks. She was told if this did not help her symptoms then she was told she might get started on a stimulant with close monitoring of her symptoms.     She is continuing to monitor her headaches. She recently saw the neurologist for her headaches. She is going to continue to track them to see how many she is getting a month. She is doing well with tylenol and ibuprofen. She thinks her follow up with neurology in is October or November of this year.     Review of Systems   Constitutional:  Negative for activity change, appetite change, fatigue and fever.   HENT:  Negative for congestion.    Respiratory:  Negative for cough, choking and chest tightness.    Cardiovascular:  Negative for chest pain, palpitations and leg swelling.   Musculoskeletal:  Negative for arthralgias, back pain and gait problem.   Skin:  Negative for color change and pallor.   Neurological:  Positive for headaches. Negative for dizziness, facial asymmetry and light-headedness.       Objective   /66 (BP Location: Left arm, Patient Position: Sitting)   Pulse 61   Wt 56.1 kg (123 lb 9.6 oz)   BMI 20.57 kg/m²   BSA Body surface area is 1.6 meters squared.      Physical Exam  Constitutional:       General: She is not in acute distress.     Appearance: Normal appearance. She is not toxic-appearing.   HENT:      Head:  Normocephalic.      Right Ear: Tympanic membrane, ear canal and external ear normal.      Left Ear: Tympanic membrane, ear canal and external ear normal.     Eyes:      Conjunctiva/sclera: Conjunctivae normal.      Pupils: Pupils are equal, round, and reactive to light.       Cardiovascular:      Rate and Rhythm: Normal rate and regular rhythm.      Pulses: Normal pulses.      Heart sounds: Normal heart sounds.   Pulmonary:      Effort: No respiratory distress.      Breath sounds: No wheezing, rhonchi or rales.     Musculoskeletal:         General: No swelling or tenderness.     Skin:     Findings: No lesion or rash.     Neurological:      General: No focal deficit present.      Mental Status: She is alert and oriented to person, place, and time. Mental status is at baseline.      Gait: Gait normal.     Psychiatric:         Mood and Affect: Mood normal.         Behavior: Behavior normal.         Thought Content: Thought content normal.         Judgment: Judgment normal.       Orders Only on 07/17/2025   Component Date Value Ref Range Status    CHOLESTEROL, TOTAL 07/22/2025 156  <200 mg/dL Final    HDL CHOLESTEROL 07/22/2025 58  > OR = 50 mg/dL Final    TRIGLYCERIDES 07/22/2025 40  <150 mg/dL Final    LDL-CHOLESTEROL 07/22/2025 87  mg/dL (calc) Final    Comment: Reference range: <100     Desirable range <100 mg/dL for primary prevention;    <70 mg/dL for patients with CHD or diabetic patients   with > or = 2 CHD risk factors.     LDL-C is now calculated using the Michael-Andre   calculation, which is a validated novel method providing   better accuracy than the Friedewald equation in the   estimation of LDL-C.   Michael HSU et al. ALMA ROSA. 2013;310(19): 9434-4859   (http://education.[employer].com/faq/XFO859)      CHOL/HDLC RATIO 07/22/2025 2.7  <5.0 (calc) Final    NON HDL CHOLESTEROL 07/22/2025 98  <130 mg/dL (calc) Final    Comment: For patients with diabetes plus 1 major ASCVD risk   factor, treating to a  non-HDL-C goal of <100 mg/dL   (LDL-C of <70 mg/dL) is considered a therapeutic   option.     Orders Only on 02/03/2025   Component Date Value Ref Range Status    IRON, TOTAL 02/03/2025 150  40 - 190 mcg/dL Final    IRON BINDING CAPACITY 02/03/2025 310  250 - 450 mcg/dL (calc) Final    % SATURATION 02/03/2025 48 (H)  16 - 45 % (calc) Final    GLUCOSE 02/03/2025 101 (H)  65 - 99 mg/dL Final    Comment:               Fasting reference interval     For someone without known diabetes, a glucose value  between 100 and 125 mg/dL is consistent with  prediabetes and should be confirmed with a  follow-up test.         UREA NITROGEN (BUN) 02/03/2025 14  7 - 25 mg/dL Final    CREATININE 02/03/2025 0.82  0.50 - 0.96 mg/dL Final    EGFR 02/03/2025 102  > OR = 60 mL/min/1.73m2 Final    SODIUM 02/03/2025 139  135 - 146 mmol/L Final    POTASSIUM 02/03/2025 4.2  3.5 - 5.3 mmol/L Final    CHLORIDE 02/03/2025 103  98 - 110 mmol/L Final    CARBON DIOXIDE 02/03/2025 26  20 - 32 mmol/L Final    ELECTROLYTE BALANCE 02/03/2025 10  7 - 17 mmol/L (calc) Final    CALCIUM 02/03/2025 9.6  8.6 - 10.2 mg/dL Final    PROTEIN, TOTAL 02/03/2025 7.0  6.1 - 8.1 g/dL Final    ALBUMIN 02/03/2025 4.8  3.6 - 5.1 g/dL Final    BILIRUBIN, TOTAL 02/03/2025 0.9  0.2 - 1.2 mg/dL Final    ALKALINE PHOSPHATASE 02/03/2025 33  31 - 125 U/L Final    AST 02/03/2025 14  10 - 30 U/L Final    ALT 02/03/2025 8  6 - 29 U/L Final    WHITE BLOOD CELL COUNT 02/03/2025 4.4  3.8 - 10.8 Thousand/uL Final    RED BLOOD CELL COUNT 02/03/2025 4.60  3.80 - 5.10 Million/uL Final    HEMOGLOBIN 02/03/2025 14.1  11.7 - 15.5 g/dL Final    HEMATOCRIT 02/03/2025 43.3  35.0 - 45.0 % Final    MCV 02/03/2025 94.1  80.0 - 100.0 fL Final    MCH 02/03/2025 30.7  27.0 - 33.0 pg Final    MCHC 02/03/2025 32.6  32.0 - 36.0 g/dL Final    Comment: For adults, a slight decrease in the calculated MCHC  value (in the range of 30 to 32 g/dL) is most likely  not clinically significant; however, it should  be  interpreted with caution in correlation with other  red cell parameters and the patient's clinical  condition.      RDW 02/03/2025 11.7  11.0 - 15.0 % Final    PLATELET COUNT 02/03/2025 241  140 - 400 Thousand/uL Final    MPV 02/03/2025 10.0  7.5 - 12.5 fL Final    ABSOLUTE NEUTROPHILS 02/03/2025 1,817  1,500 - 7,800 cells/uL Final    ABSOLUTE LYMPHOCYTES 02/03/2025 1,998  850 - 3,900 cells/uL Final    ABSOLUTE MONOCYTES 02/03/2025 405  200 - 950 cells/uL Final    ABSOLUTE EOSINOPHILS 02/03/2025 150  15 - 500 cells/uL Final    ABSOLUTE BASOPHILS 02/03/2025 31  0 - 200 cells/uL Final    NEUTROPHILS 02/03/2025 41.3  % Final    LYMPHOCYTES 02/03/2025 45.4  % Final    MONOCYTES 02/03/2025 9.2  % Final    EOSINOPHILS 02/03/2025 3.4  % Final    BASOPHILS 02/03/2025 0.7  % Final    FERRITIN 02/03/2025 26  16 - 154 ng/mL Final    TSH W/REFLEX TO FT4 02/03/2025 1.57  mIU/L Final    Comment:           Reference Range                         > or = 20 Years  0.40-4.50                              Pregnancy Ranges            First trimester    0.26-2.66            Second trimester   0.55-2.73            Third trimester    0.43-2.91      VITAMIN D,25-OH,TOTAL,IA 02/03/2025 26 (L)  30 - 100 ng/mL Final    Comment: Vitamin D Status         25-OH Vitamin D:     Deficiency:                    <20 ng/mL  Insufficiency:             20 - 29 ng/mL  Optimal:                 > or = 30 ng/mL     For 25-OH Vitamin D testing on patients on   D2-supplementation and patients for whom quantitation   of D2 and D3 fractions is required, the DataPadureD()  25-OH VIT D, (D2,D3), LC/MS/MS is recommended: order   code 44063 (patients >2yrs).     See Note 1     Note 1     For additional information, please refer to   http://education.[employer]/faq/DCK015   (This link is being provided for informational/  educational purposes only.)     Lab on 11/19/2024   Component Date Value Ref Range Status    Glucose 11/19/2024 90  74 - 99 mg/dL  Final    Sodium 11/19/2024 139  136 - 145 mmol/L Final    Potassium 11/19/2024 4.0  3.5 - 5.3 mmol/L Final    Chloride 11/19/2024 101  98 - 107 mmol/L Final    Bicarbonate 11/19/2024 28  21 - 32 mmol/L Final    Anion Gap 11/19/2024 14  10 - 20 mmol/L Final    Urea Nitrogen 11/19/2024 10  6 - 23 mg/dL Final    Creatinine 11/19/2024 0.80  0.50 - 1.05 mg/dL Final    eGFR 11/19/2024 >90  >60 mL/min/1.73m*2 Final    Calculations of estimated GFR are performed using the 2021 CKD-EPI Study Refit equation without the race variable for the IDMS-Traceable creatinine methods.  https://jasn.asnjournals.org/content/early/2021/09/22/ASN.7301011695    Calcium 11/19/2024 9.9  8.6 - 10.6 mg/dL Final    Albumin 11/19/2024 5.2 (H)  3.4 - 5.0 g/dL Final    Alkaline Phosphatase 11/19/2024 35  33 - 110 U/L Final    Total Protein 11/19/2024 7.1  6.4 - 8.2 g/dL Final    AST 11/19/2024 13  9 - 39 U/L Final    Bilirubin, Total 11/19/2024 0.5  0.0 - 1.2 mg/dL Final    ALT 11/19/2024 7  7 - 45 U/L Final    Patients treated with Sulfasalazine may generate falsely decreased results for ALT.    Iron 11/19/2024 116  35 - 150 ug/dL Final    UIBC 11/19/2024 272  110 - 370 ug/dL Final    TIBC 11/19/2024 388  240 - 445 ug/dL Final    % Saturation 11/19/2024 30  25 - 45 % Final    Ferritin 11/19/2024 33  8 - 150 ng/mL Final    Thyroid Stimulating Hormone 11/19/2024 0.84  0.44 - 3.98 mIU/L Final    WBC 11/19/2024 7.2  4.4 - 11.3 x10*3/uL Final    nRBC 11/19/2024 0.0  0.0 - 0.0 /100 WBCs Final    RBC 11/19/2024 4.68  4.00 - 5.20 x10*6/uL Final    Hemoglobin 11/19/2024 14.5  12.0 - 16.0 g/dL Final    Hematocrit 11/19/2024 43.8  36.0 - 46.0 % Final    MCV 11/19/2024 94  80 - 100 fL Final    MCH 11/19/2024 31.0  26.0 - 34.0 pg Final    MCHC 11/19/2024 33.1  32.0 - 36.0 g/dL Final    RDW 11/19/2024 12.0  11.5 - 14.5 % Final    Platelets 11/19/2024 267  150 - 450 x10*3/uL Final    Neutrophils % 11/19/2024 57.2  40.0 - 80.0 % Final    Immature Granulocytes %,  Automated 11/19/2024 0.1  0.0 - 0.9 % Final    Immature Granulocyte Count (IG) includes promyelocytes, myelocytes and metamyelocytes but does not include bands. Percent differential counts (%) should be interpreted in the context of the absolute cell counts (cells/UL).    Lymphocytes % 11/19/2024 33.7  13.0 - 44.0 % Final    Monocytes % 11/19/2024 6.8  2.0 - 10.0 % Final    Eosinophils % 11/19/2024 1.4  0.0 - 6.0 % Final    Basophils % 11/19/2024 0.8  0.0 - 2.0 % Final    Neutrophils Absolute 11/19/2024 4.12  1.20 - 7.70 x10*3/uL Final    Percent differential counts (%) should be interpreted in the context of the absolute cell counts (cells/uL).    Immature Granulocytes Absolute, Au* 11/19/2024 0.01  0.00 - 0.70 x10*3/uL Final    Lymphocytes Absolute 11/19/2024 2.43  1.20 - 4.80 x10*3/uL Final    Monocytes Absolute 11/19/2024 0.49  0.10 - 1.00 x10*3/uL Final    Eosinophils Absolute 11/19/2024 0.10  0.00 - 0.70 x10*3/uL Final    Basophils Absolute 11/19/2024 0.06  0.00 - 0.10 x10*3/uL Final    Luteinizing Hormone 11/19/2024 5.9  IU/L Final    LH Reference Values  Follicular Phase          1.9-12.5 IU/L  Mid-Cycle                 8.7-76.3 IU/L  Luteal Phase              0.5-16.9 IU/L  Post Menopause            5.0-55.2 IU/L  Children                    0- 6.0 IU/L  Adult Male 18-70 years    1.5- 9.3 IU/L  Adult Male >70 years      3.1-34.6 IU/L    Follicle Stimulating Hormone 11/19/2024 7.6  IU/L Final    FSH Ref Values  Follicular   2.0-12.0  IU/L  Mid-Cycle        12.0-25.0  IU/L  Luteal Phase      2.0-12.0  IU/L  Menopause       30.0-150.0  IU/L  Pre-puberty     50% Adult IU/L  Adult Male        2.0-10.0  IU/L   Infants          0.0-1.0  IU/L    Estradiol 11/19/2024 77  pg/mL Final     Current Outpatient Medications on File Prior to Visit   Medication Sig Dispense Refill    albuterol 90 mcg/actuation inhaler Inhale 2 puffs every 4 hours if needed for wheezing. 18 g 1    cholecalciferol (Vitamin D-3) 50 MCG (2000  UT) tablet Take 1 tablet (2,000 Units) by mouth once daily.      [DISCONTINUED] tretinoin (Retin-A) 0.025 % cream Apply topically once daily at bedtime. (Patient not taking: Reported on 8/5/2025)       No current facility-administered medications on file prior to visit.     No images are attached to the encounter.            Assessment/Plan   Diagnoses and all orders for this visit:  Intractable migraine without aura and with status migrainosus  Migraine without aura and without status migrainosus, not intractable  Reactive depression        1.  Patient to take excedrin migraine  2.  Patient to have blood work performed  3.  Will hold off on stimulant medication at this time  4.  Patient call for questions or concerns

## 2025-08-05 ENCOUNTER — APPOINTMENT (OUTPATIENT)
Dept: PRIMARY CARE | Facility: CLINIC | Age: 25
End: 2025-08-05
Payer: COMMERCIAL

## 2025-08-05 VITALS
BODY MASS INDEX: 20.57 KG/M2 | DIASTOLIC BLOOD PRESSURE: 66 MMHG | HEART RATE: 61 BPM | WEIGHT: 123.6 LBS | SYSTOLIC BLOOD PRESSURE: 110 MMHG

## 2025-08-05 DIAGNOSIS — G43.009 MIGRAINE WITHOUT AURA AND WITHOUT STATUS MIGRAINOSUS, NOT INTRACTABLE: ICD-10-CM

## 2025-08-05 DIAGNOSIS — F50.00 ANOREXIA NERVOSA: ICD-10-CM

## 2025-08-05 DIAGNOSIS — F32.9 REACTIVE DEPRESSION: ICD-10-CM

## 2025-08-05 DIAGNOSIS — G43.011 INTRACTABLE MIGRAINE WITHOUT AURA AND WITH STATUS MIGRAINOSUS: Primary | ICD-10-CM

## 2025-08-05 PROCEDURE — 1036F TOBACCO NON-USER: CPT | Performed by: FAMILY MEDICINE

## 2025-08-05 PROCEDURE — 99214 OFFICE O/P EST MOD 30 MIN: CPT | Performed by: FAMILY MEDICINE

## 2025-08-05 ASSESSMENT — PATIENT HEALTH QUESTIONNAIRE - PHQ9
1. LITTLE INTEREST OR PLEASURE IN DOING THINGS: NOT AT ALL
2. FEELING DOWN, DEPRESSED OR HOPELESS: NOT AT ALL
SUM OF ALL RESPONSES TO PHQ9 QUESTIONS 1 AND 2: 0

## 2025-09-16 ENCOUNTER — APPOINTMENT (OUTPATIENT)
Dept: PRIMARY CARE | Facility: CLINIC | Age: 25
End: 2025-09-16
Payer: COMMERCIAL